# Patient Record
Sex: FEMALE | Race: WHITE | Employment: FULL TIME | URBAN - METROPOLITAN AREA
[De-identification: names, ages, dates, MRNs, and addresses within clinical notes are randomized per-mention and may not be internally consistent; named-entity substitution may affect disease eponyms.]

---

## 2017-03-13 ENCOUNTER — RADIANT APPOINTMENT (OUTPATIENT)
Dept: GENERAL RADIOLOGY | Facility: CLINIC | Age: 52
End: 2017-03-13
Attending: INTERNAL MEDICINE
Payer: COMMERCIAL

## 2017-03-13 ENCOUNTER — OFFICE VISIT (OUTPATIENT)
Dept: PEDIATRICS | Facility: CLINIC | Age: 52
End: 2017-03-13
Payer: COMMERCIAL

## 2017-03-13 VITALS
WEIGHT: 140.13 LBS | RESPIRATION RATE: 14 BRPM | BODY MASS INDEX: 23.35 KG/M2 | OXYGEN SATURATION: 100 % | DIASTOLIC BLOOD PRESSURE: 80 MMHG | SYSTOLIC BLOOD PRESSURE: 120 MMHG | TEMPERATURE: 97.8 F | HEART RATE: 73 BPM | HEIGHT: 65 IN

## 2017-03-13 DIAGNOSIS — Z12.11 COLON CANCER SCREENING: ICD-10-CM

## 2017-03-13 DIAGNOSIS — M62.838 MUSCLE SPASM OF LEFT SHOULDER AREA: ICD-10-CM

## 2017-03-13 DIAGNOSIS — Z12.31 ENCOUNTER FOR SCREENING MAMMOGRAM FOR BREAST CANCER: ICD-10-CM

## 2017-03-13 DIAGNOSIS — R91.8 PULMONARY NODULES: ICD-10-CM

## 2017-03-13 DIAGNOSIS — R07.89 ATYPICAL CHEST PAIN: Primary | ICD-10-CM

## 2017-03-13 DIAGNOSIS — R07.89 ATYPICAL CHEST PAIN: ICD-10-CM

## 2017-03-13 PROCEDURE — 71020 XR CHEST 2 VW: CPT

## 2017-03-13 PROCEDURE — 99214 OFFICE O/P EST MOD 30 MIN: CPT | Performed by: INTERNAL MEDICINE

## 2017-03-13 PROCEDURE — 93000 ELECTROCARDIOGRAM COMPLETE: CPT | Performed by: INTERNAL MEDICINE

## 2017-03-13 RX ORDER — CYCLOBENZAPRINE HCL 5 MG
5 TABLET ORAL 3 TIMES DAILY PRN
Qty: 42 TABLET | Refills: 0 | Status: SHIPPED | OUTPATIENT
Start: 2017-03-13 | End: 2017-12-11

## 2017-03-13 NOTE — NURSING NOTE
"Chief Complaint   Patient presents with     Establish Care       Initial /80 (BP Location: Right arm, Patient Position: Chair, Cuff Size: Adult Regular)  Pulse 73  Temp 97.8  F (36.6  C) (Oral)  Resp 14  Ht 5' 5\" (1.651 m)  Wt 140 lb 2 oz (63.6 kg)  LMP 02/27/2017 (Exact Date)  SpO2 100%  Breastfeeding? No  BMI 23.32 kg/m2 Estimated body mass index is 23.32 kg/(m^2) as calculated from the following:    Height as of this encounter: 5' 5\" (1.651 m).    Weight as of this encounter: 140 lb 2 oz (63.6 kg).  Medication Reconciliation: complete     Jazz Hoffman MA 3/13/2017 10:58 AM    "

## 2017-03-13 NOTE — MR AVS SNAPSHOT
After Visit Summary   3/13/2017    Brii Taylor    MRN: [id number]           Patient Information     Date Of Birth          1965        Visit Information        Provider Department      3/13/2017 10:50 AM Fariba Bryson MD [facility]        Today's Diagnoses     Colon cancer screening    -  1    Encounter for screening mammogram for breast cancer        Muscle spasm of left shoulder area        Atypical chest pain          Care Instructions    This seems most likely to be due to the muscle tightness/spasming in your left shoulder. ECG today looked good, we will check a chest X-ray on your way out of clinic.     In the meantime, try the followin. Scheduled ibuprofen for a few days, take 400-600mg three times daily with food!  2. Stretching! Ice, heat can be helpful as well.  3 Use muscle relaxant (Flexeril) at night as needed, no drinking alcohol of driving with this.  4. We can consider physical therapy in the future.    If symptoms worsen, you develop chest pain with exertion or shortness of breath, go to the ER or call us immediately.         Follow-ups after your visit        Additional Services     GASTROENTEROLOGY ADULT REF PROCEDURE ONLY       Last Lab Result: Creatinine (mg/dL)       Date                     Value                 10/26/2015               0.66             ----------  Body mass index is 23.32 kg/(m^2).      Patient will be contacted to schedule procedure.     Please be aware that coverage of these services is subject to the terms and limitations of your health insurance plan.  Call member services at your health plan with any benefit or coverage questions.  Any procedures must be performed at a [place] facility OR coordinated by your clinic's referral office.    Please bring the following with you to your appointment:    (1) Any X-Rays, CTs or MRIs which have been performed.  Contact the facility where they were done to arrange for  prior to  your scheduled appointment.    (2) List of current medications   (3) This referral request   (4) Any documents/labs given to you for this referral                  Your next 10 appointments already scheduled     Mar 13, 2017 11:35 AM CDT   XR CHEST 2 VIEWS with EAXR1   [facility] ([facility])    [address] [place] [address]  [address]  [place] [id number] [phone]           Please bring a list of your current medicines to your exam. (Include vitamins, minerals and over-thecounter medicines.) Leave your valuables at home.  Tell your doctor if there is a chance you may be pregnant.  You do not need to do anything special for this exam.              Future tests that were ordered for you today     Open Future Orders        Priority Expected Expires Ordered    *MA Screening Digital Bilateral Routine  3/13/2018 3/13/2017            Who to contact     If you have questions or need follow up information about today's clinic visit or your schedule please contact [facility] directly at [phone].  Normal or non-critical lab and imaging results will be communicated to you by Webcrunchhart, letter or phone within 4 business days after the clinic has received the results. If you do not hear from us within 7 days, please contact the clinic through [email or web address] or phone. If you have a critical or abnormal lab result, we will notify you by phone as soon as possible.  Submit refill requests through [email or web address] or call your pharmacy and they will forward the refill request to us. Please allow 3 business days for your refill to be completed.          Additional Information About Your Visit        [email or web address] Information     [email or web address] gives you secure access to your electronic health record. If you see a primary care provider, you can also send messages to your care team and make appointments. If you have questions, please call your primary care clinic.  If you do not have a primary care provider, please  "call [phone] and they will assist you.        Care EveryWhere ID     This is your Care EveryWhere ID. This could be used by other organizations to access your [facility] medical records  [id number]        Your Vitals Were     Pulse Temperature Respirations Height Last Period Pulse Oximetry    73 97.8  F (36.6  C) (Oral) 14 5' 5\" (1.651 m) 02/27/2017 (Exact Date) 100%    Breastfeeding? BMI (Body Mass Index)                No 23.32 kg/m2           Blood Pressure from Last 3 Encounters:   03/13/17 120/80   02/22/16 118/80   10/26/15 124/78    Weight from Last 3 Encounters:   03/13/17 140 lb 2 oz (63.6 kg)   02/22/16 142 lb (64.4 kg)   10/26/15 139 lb 9.6 oz (63.3 kg)              We Performed the Following     EKG 12-lead complete w/read - Clinics     GASTROENTEROLOGY ADULT REF PROCEDURE ONLY          Today's Medication Changes          These changes are accurate as of: 3/13/17 11:31 AM.  If you have any questions, ask your nurse or doctor.               Start taking these medicines.        Dose/Directions    cyclobenzaprine 5 MG tablet   Commonly known as:  FLEXERIL   Used for:  Muscle spasm of left shoulder area   Started by:  Fariba Bryson MD        Dose:  5 mg   Take 1 tablet (5 mg) by mouth 3 times daily as needed for muscle spasms   Quantity:  42 tablet   Refills:  0            Where to get your medicines      These medications were sent to [facility] [id number] [place] [address] [facility] [address] [address] [facility] [address]     Phone:  [phone]     cyclobenzaprine 5 MG tablet                Primary Care Provider Office Phone # Fax #    Shadykumari ERLINDA Pan [phone]       [facility] [address] [place] [address]        Thank you!     Thank you for choosing [facility]  for your care. Our goal is always to provide you with excellent care. Hearing back from our patients is one way we can continue to " improve our services. Please take a few minutes to complete the written survey that you may receive in the mail after your visit with us. Thank you!             Your Updated Medication List - Protect others around you: Learn how to safely use, store and throw away your medicines at www.disposemymeds.org.          This list is accurate as of: 3/13/17 11:31 AM.  Always use your most recent med list.                   Brand Name Dispense Instructions for use    cyclobenzaprine 5 MG tablet    FLEXERIL    42 tablet    Take 1 tablet (5 mg) by mouth 3 times daily as needed for muscle spasms       NECON 0.5/35 (28) 0.5-35 MG-MCG per tablet   Generic drug:  norethindrone-ethinyl estradiol      1 TABLET DAILY       ZOLOFT 25 MG tablet   Generic drug:  sertraline      Take  by mouth daily. 1 1/2 tab daily-37 mg

## 2017-03-13 NOTE — PATIENT INSTRUCTIONS
This seems most likely to be due to the muscle tightness/spasming in your left shoulder. ECG today looked good, we will check a chest X-ray on your way out of clinic.     In the meantime, try the followin. Scheduled ibuprofen for a few days, take 400-600mg three times daily with food!  2. Stretching! Ice, heat can be helpful as well.  3 Use muscle relaxant (Flexeril) at night as needed, no drinking alcohol of driving with this.  4. We can consider physical therapy in the future.    If symptoms worsen, you develop chest pain with exertion or shortness of breath, go to the ER or call us immediately.

## 2017-03-13 NOTE — PROGRESS NOTES
"  SUBJECTIVE:                                                    Brii Taylor is a 51 year old female who presents to clinic today for the following health issues:    Pt is here today to establish care with Dr. Orosco    Chest Pain      Onset: x1 week intermittent     Description (location/character/radiation/duration): left side, pulling and cramping, discomfort, radiates into left arm. Unsure if related to shoulder pain.     Intensity:  5/10    Accompanying signs and symptoms:        Shortness of breath: no        Sweating: no        Nausea/vomitting: no        Palpitations: no        Other (fevers/chills/cough/heartburn/lightheadedness): none     History (similar episodes/previous evaluation): None    Precipitating or alleviating factors:       Worse with exertion: no        Worse with breathing: no        Related to eating: no        Better with burping: no     Therapies tried and outcome: Gela Teresa comes in for evaluation of L sided shoulder pain that radiates around to the front of her chest. She reports that she has had a history of a \"crick\" in her L shoulder previously, which will occasionally get worse with stress, and it feels similar to this. However, the pain now wraps around to her shoulder, extends down the arm at least to the elbow, and wraps around the front of her chest. The pain feels like a cramping and squeezing. She does have some tingling down her arm. The pain comes and goes, it is not better or worse with exertion or rest. She really hasn't noticed anything that makes the pain better or worse aside from Advil helping a little. Symptoms have been going on for past 10 days, maybe getting a little worse. No pain in her neck. no shortness of breath, sweating or nausea.       Problem list and histories reviewed & adjusted, as indicated.  Additional history: as documented    Patient Active Problem List   Diagnosis     Contraception     Anxiety     CARDIOVASCULAR SCREENING; LDL GOAL " "LESS THAN 160     Onychomycosis     Finger pain, right     Plantar warts     Pulmonary nodules     Mitral valve prolapse     Past Surgical History   Procedure Laterality Date     Tonsillectomy         Social History   Substance Use Topics     Smoking status: Never Smoker     Smokeless tobacco: Never Used     Alcohol use No     Family History   Problem Relation Age of Onset     Hypertension Mother      CANCER Mother 70     nonHodgkins lymphoma     Psychotic Disorder Mother      anxiety     Alzheimer Disease Father      Breast Cancer Sister      breast ca age 38     GASTROINTESTINAL DISEASE Brother      crohns or colitis           Reviewed and updated as needed this visit by clinical staff  Tobacco  Allergies  Med Hx  Fam Hx  Soc Hx      ROS:  Constitutional, HEENT, cardiovascular, pulmonary, gi and gu systems are negative, except as otherwise noted.    OBJECTIVE:                                                    /80 (BP Location: Right arm, Patient Position: Chair, Cuff Size: Adult Regular)  Pulse 73  Temp 97.8  F (36.6  C) (Oral)  Resp 14  Ht 5' 5\" (1.651 m)  Wt 140 lb 2 oz (63.6 kg)  LMP 02/27/2017 (Exact Date)  SpO2 100%  Breastfeeding? No  BMI 23.32 kg/m2  Body mass index is 23.32 kg/(m^2).  GENERAL: healthy, alert and no distress  EYES: Eyes grossly normal to inspection, PERRL and conjunctivae and sclerae normal  HENT: ear canals and TM's normal, nose and mouth without ulcers or lesions  NECK: no adenopathy, no asymmetry, masses, or scars and thyroid normal to palpation  RESP: lungs clear to auscultation - no rales, rhonchi or wheezes  CV: regular rate and rhythm, normal S1 S2, no S3 or S4, no murmur, click or rub, no peripheral edema and peripheral pulses strong  ABDOMEN: soft, nontender, no hepatosplenomegaly, no masses and bowel sounds normal  MS: +reproducible tenderness and tightness along L rhomboid and trapezius connection to scapula. no reproducible L chest pain. Full ROM of L shoulder without " tenderness.     Diagnostic Test Results:  Xray - No pulmonary infiltrates and normal cardiac size, multiple R sided well circumscribed nodules with more irregular L sided nodule  EKG - NSR by my read. No ST changes.      ASSESSMENT/PLAN:                                                      1. Atypical chest pain  Symptoms seem most consistent with MSK process, given shoulder symptoms. ECG reassuring, and given lack of exertional symptoms, unlikely to be ACS. CXR concerning for L lung nodule (doubt however this is source of her pain), work-up as below.   - XR Chest 2 Views; Future    2. Muscle spasm of left shoulder area  Continue with NSAIDs at this time, will add in muscle relaxant to see if this helps. Discussed heat/ice and stretching. Can refer to physical therapy if needed.   - cyclobenzaprine (FLEXERIL) 5 MG tablet; Take 1 tablet (5 mg) by mouth 3 times daily as needed for muscle spasms  Dispense: 42 tablet; Refill: 0    3. Colon cancer screening  - GASTROENTEROLOGY ADULT REF PROCEDURE ONLY    4. Encounter for screening mammogram for breast cancer  - *MA Screening Digital Bilateral; Future    5. Pulmonary nodules  Will obtain chest CT for further characterization.   - CT Chest w Contrast; Future    Patient Instructions   This seems most likely to be due to the muscle tightness/spasming in your left shoulder. ECG today looked good, we will check a chest X-ray on your way out of clinic.     In the meantime, try the followin. Scheduled ibuprofen for a few days, take 400-600mg three times daily with food!  2. Stretching! Ice, heat can be helpful as well.  3 Use muscle relaxant (Flexeril) at night as needed, no drinking alcohol of driving with this.  4. We can consider physical therapy in the future.    If symptoms worsen, you develop chest pain with exertion or shortness of breath, go to the ER or call us immediately.       Fariba Orosco MD  [facility]

## 2017-03-17 PROBLEM — I34.1 MITRAL VALVE PROLAPSE: Status: ACTIVE | Noted: 2017-03-17

## 2017-03-20 ENCOUNTER — HOSPITAL ENCOUNTER (OUTPATIENT)
Dept: MAMMOGRAPHY | Facility: CLINIC | Age: 52
End: 2017-03-20
Attending: INTERNAL MEDICINE
Payer: COMMERCIAL

## 2017-03-20 ENCOUNTER — HOSPITAL ENCOUNTER (OUTPATIENT)
Dept: CT IMAGING | Facility: CLINIC | Age: 52
Discharge: HOME | End: 2017-03-20
Attending: INTERNAL MEDICINE | Admitting: INTERNAL MEDICINE
Payer: COMMERCIAL

## 2017-03-20 DIAGNOSIS — Z12.31 ENCOUNTER FOR SCREENING MAMMOGRAM FOR BREAST CANCER: ICD-10-CM

## 2017-03-20 DIAGNOSIS — R91.8 PULMONARY NODULES: ICD-10-CM

## 2017-03-20 PROCEDURE — 25500064 ZZH RX 255 OP 636: Performed by: RADIOLOGY

## 2017-03-20 PROCEDURE — G0202 SCR MAMMO BI INCL CAD: HCPCS

## 2017-03-20 PROCEDURE — 25000128 H RX IP 250 OP 636: Performed by: RADIOLOGY

## 2017-03-20 PROCEDURE — 71260 CT THORAX DX C+: CPT

## 2017-03-20 RX ORDER — IOPAMIDOL 755 MG/ML
500 INJECTION, SOLUTION INTRAVASCULAR ONCE
Status: COMPLETED | OUTPATIENT
Start: 2017-03-20 | End: 2017-03-20

## 2017-03-20 RX ADMIN — IOPAMIDOL 80 ML: 755 INJECTION, SOLUTION INTRAVENOUS at 08:06

## 2017-03-20 RX ADMIN — SODIUM CHLORIDE 60 ML: 9 INJECTION, SOLUTION INTRAVENOUS at 08:06

## 2017-03-31 ENCOUNTER — TELEPHONE (OUTPATIENT)
Dept: GASTROENTEROLOGY | Facility: CLINIC | Age: 52
End: 2017-03-31

## 2017-03-31 NOTE — TELEPHONE ENCOUNTER
Third attempt to reach patient to schedule Colonoscopy. Not Scheduled at [facility]. Left Messages.

## 2017-10-02 ENCOUNTER — TRANSFERRED RECORDS (OUTPATIENT)
Dept: HEALTH INFORMATION MANAGEMENT | Facility: CLINIC | Age: 52
End: 2017-10-02

## 2017-10-02 LAB
HPV ABSTRACT: NORMAL
PAP-ABSTRACT: NORMAL

## 2017-12-11 RX ORDER — NORETHINDRONE AND ETHINYL ESTRADIOL 1 MG-35MCG
1-35 KIT ORAL DAILY
Refills: 1 | COMMUNITY
Start: 2017-06-19 | End: 2019-03-08 | Stop reason: ALTCHOICE

## 2017-12-18 ENCOUNTER — HOSPITAL ENCOUNTER (OUTPATIENT)
Facility: CLINIC | Age: 52
Discharge: HOME | End: 2017-12-18
Attending: INTERNAL MEDICINE | Admitting: INTERNAL MEDICINE
Payer: COMMERCIAL

## 2017-12-18 VITALS
BODY MASS INDEX: 21.69 KG/M2 | DIASTOLIC BLOOD PRESSURE: 69 MMHG | WEIGHT: 135 LBS | HEIGHT: 66 IN | RESPIRATION RATE: 14 BRPM | SYSTOLIC BLOOD PRESSURE: 119 MMHG | HEART RATE: 85 BPM | OXYGEN SATURATION: 96 %

## 2017-12-18 LAB — COLONOSCOPY: NORMAL

## 2017-12-18 PROCEDURE — 25000128 H RX IP 250 OP 636: Performed by: INTERNAL MEDICINE

## 2017-12-18 PROCEDURE — 45378 DIAGNOSTIC COLONOSCOPY: CPT | Performed by: INTERNAL MEDICINE

## 2017-12-18 PROCEDURE — G0121 COLON CA SCRN NOT HI RSK IND: HCPCS | Performed by: INTERNAL MEDICINE

## 2017-12-18 PROCEDURE — G0500 MOD SEDAT ENDO SERVICE >5YRS: HCPCS | Performed by: INTERNAL MEDICINE

## 2017-12-18 RX ORDER — NALOXONE HYDROCHLORIDE 0.4 MG/ML
.1-.4 INJECTION, SOLUTION INTRAMUSCULAR; INTRAVENOUS; SUBCUTANEOUS
Status: DISCONTINUED | OUTPATIENT
Start: 2017-12-18 | End: 2017-12-18 | Stop reason: HOSPADM

## 2017-12-18 RX ORDER — ONDANSETRON 2 MG/ML
4 INJECTION INTRAMUSCULAR; INTRAVENOUS EVERY 6 HOURS PRN
Status: DISCONTINUED | OUTPATIENT
Start: 2017-12-18 | End: 2017-12-18 | Stop reason: HOSPADM

## 2017-12-18 RX ORDER — LIDOCAINE 40 MG/G
CREAM TOPICAL
Status: DISCONTINUED | OUTPATIENT
Start: 2017-12-18 | End: 2017-12-18 | Stop reason: HOSPADM

## 2017-12-18 RX ORDER — ONDANSETRON 2 MG/ML
4 INJECTION INTRAMUSCULAR; INTRAVENOUS
Status: DISCONTINUED | OUTPATIENT
Start: 2017-12-18 | End: 2017-12-18 | Stop reason: HOSPADM

## 2017-12-18 RX ORDER — FENTANYL CITRATE 50 UG/ML
INJECTION, SOLUTION INTRAMUSCULAR; INTRAVENOUS PRN
Status: DISCONTINUED | OUTPATIENT
Start: 2017-12-18 | End: 2017-12-18 | Stop reason: HOSPADM

## 2017-12-18 RX ORDER — FLUMAZENIL 0.1 MG/ML
0.2 INJECTION, SOLUTION INTRAVENOUS
Status: DISCONTINUED | OUTPATIENT
Start: 2017-12-18 | End: 2017-12-18 | Stop reason: HOSPADM

## 2017-12-18 RX ORDER — ONDANSETRON 4 MG/1
4 TABLET, ORALLY DISINTEGRATING ORAL EVERY 6 HOURS PRN
Status: DISCONTINUED | OUTPATIENT
Start: 2017-12-18 | End: 2017-12-18 | Stop reason: HOSPADM

## 2017-12-18 NOTE — H&P
Pre-Endoscopy History and Physical     Brii Taylor MRN# [id number]   YOB: 1965 Age: 52 year old     Date of Procedure: 12/18/2017  Primary care provider: Fariba Bryson  Type of Endoscopy: Colonoscopy with possible biopsy, possible polypectomy  Reason for Procedure: screen  Type of Anesthesia Anticipated: Conscious Sedation    HPI:    Brii is a 52 year old female who will be undergoing the above procedure.      A history and physical has been performed. The patient's medications and allergies have been reviewed. The risks and benefits of the procedure and the sedation options and risks were discussed with the patient.  All questions were answered and informed consent was obtained.      She denies a personal or family history of anesthesia complications or bleeding disorders.     Patient Active Problem List   Diagnosis     Contraception     Anxiety     CARDIOVASCULAR SCREENING; LDL GOAL LESS THAN 160     Onychomycosis     Finger pain, right     Plantar warts     Pulmonary nodules     Mitral valve prolapse        Past Medical History:   Diagnosis Date     Anxiety     sees psychiatrist     Migraine, unspecified, without mention of intractable migraine without mention of status migrainosus      Sleep disturbance, unspecified     hx sleep apnea     Viral warts, unspecified         Past Surgical History:   Procedure Laterality Date     TONSILLECTOMY         Social History   Substance Use Topics     Smoking status: Never Smoker     Smokeless tobacco: Never Used     Alcohol use No       Family History   Problem Relation Age of Onset     Hypertension Mother      CANCER Mother 70     nonHodgkins lymphoma     Psychotic Disorder Mother      anxiety     Alzheimer Disease Father      Breast Cancer Sister      breast ca age 38     GASTROINTESTINAL DISEASE Brother      crohns or colitis     Colon Cancer No family hx of        Prior to Admission medications    Medication Sig Start Date End Date Taking?  "Authorizing Provider   NORTREL 1/35, 28, 1-35 MG-MCG per tablet Take 1-35 mcg by mouth daily 6/19/17  Yes Reported, Patient   sertraline (ZOLOFT) 25 MG tablet Take  by mouth daily. 1 1/2 tab daily-37 mg   Yes Reported, Patient       Allergies   Allergen Reactions     Penicillins      PN: LW Reaction: rash        REVIEW OF SYSTEMS:   5 point ROS negative except as noted above in HPI, including Gen., Resp., CV, GI &  system review.    PHYSICAL EXAM:   There were no vitals taken for this visit. Estimated body mass index is 23.32 kg/(m^2) as calculated from the following:    Height as of 3/13/17: 1.651 m (5' 5\").    Weight as of 3/13/17: 63.6 kg (140 lb 2 oz).   GENERAL APPEARANCE: alert, and oriented  MENTAL STATUS: alert  AIRWAY EXAM: Mallampatti Class I (visualization of the soft palate, fauces, uvula, anterior and posterior pillars)  RESP: lungs clear to auscultation - no rales, rhonchi or wheezes  CV: regular rates and rhythm  DIAGNOSTICS:    Not indicated    IMPRESSION   ASA Class 1 - Healthy patient, no medical problems    PLAN:   Plan for Colonoscopy with possible biopsy, possible polypectomy. We discussed the risks, benefits and alternatives and the patient wished to proceed.    The above has been forwarded to the consulting provider.      Signed Electronically by: Babar Gramajo  December 18, 2017          "

## 2017-12-18 NOTE — LETTER
November 8, 2017  Brii Taylor  [address] [facility] [address]        Thank you for choosing [facility]. You are scheduled for the following service.     Date:  Monday, December 4             Procedure:  COLONOSCOPY  Doctor:        Dr. Babar Gramajo   Arrival Time:   12:30pm  *check in at Emergency/Endoscopy desk*  Procedure Time:  1pm    Location:   [facility]        Endoscopy Department, First Floor (Enter through ER Doors) *        [address] [place], [place] [address]      [phone] or [phone] () to reschedule      MIRALAX -GATORADE  PREP  Colonoscopy is the most accurate test to detect colon polyps and colon cancer; and the only test where polyps can be removed. During this procedure, a doctor examines the lining of your large intestine and rectum through a flexible tube.           Transportation  Arrange for a ride for the day of your procedure with a responsible adult.  A taxi ride is not an option unless you are accompanied by a responsible adult. If you fail to arrange transportation with a responsible adult, your procedure will be cancelled and rescheduled.    Purchase the  following supplies at your local pharmacy:  - 2 (two) bisacodyl tablets: each tablet contains 5 mg.  (Dulcolax  laxative NOT Dulcolax  stool softener)   - 1 (one) 8.3 oz bottle of Polyethylene Glycol (PEG) 3350 Powder   (MiraLAX , Smooth LAX , ClearLAX  or equivalent)  - 64 oz Gatorade    Regular Gatorade, Gatorade G2 , Powerade , Powerade Zero  or Pedialyte  is acceptable. Red colored flavors are not allowed; all other colors (yellow, green, orange, purple and blue) are okay. It is also okay to buy two 2.12 oz packets of powdered Gatorade that can be mixed with water to a total volume of 64 oz of liquid.  - 1 (one) 10 oz bottle of Magnesium Citrate (Red colored flavors are not allowed)  It is also okay for you to use a 0.5 oz package of  powdered magnesium citrate (17 g) mixed with 10 oz of water.    PREPARATION FOR COLONOSCOPY    7 days before:    Discontinue fiber supplements and medications containing iron. This includes Metamucil  and Fibercon ; and multivitamins with iron.  3 days before:    Begin a low-fiber diet. A low-fiber diet helps making the cleanout more effective.     Examples of a low-fiber diet include (but are not limited to): white bread, white rice, pasta, crackers, fish, chicken, eggs, ground beef, creamy peanut butter, cooked/steamed/boiled vegetables, canned fruit, bananas, melons, milk, plain yogurt cheese, salad dressing and other condiments.     The following are not allowed on a low-fiber diet: seeds, nuts, popcorn, bran, whole wheat, corn, quinoa, raw fruits and vegetables, berries and dried fruit, beans and lentils.    For additional details on low-fiber diet, please refer to the table on the last page.  2 days before:    Continue the low-fiber diet.     Drink at least 8 glasses of water throughout the day.     Stop eating solid foods at 11:45 pm.  1 day before:    In the morning: begin a clear liquid diet (liquids you can see through).     Examples of a clear liquid diet include: water, clear broth or bouillon, Gatorade, Pedialyte or Powerade, carbonated and non-carbonated soft drinks (Sprite , 7-Up , ginger ale), strained fruit juices without pulp (apple, white grape, white cranberry), Jell-O  and popsicles.     The following are not allowed on a clear liquid diet: red liquids, alcoholic beverages, coffee, dairy products (milk, creamer, and yogurt), protein shakes, creamy broths, juice with pulp and chewing tobacco.    At noon: take 2 (two) bisacodyl tablets     At 4 (and no later than 6pm): start drinking the Miralax-Gatorade preparation (8.3 oz of Miralax mixed with 64 oz of Gatorade in a large pitcher). Drink 1(one) 8 oz glass every 15 minutes thereafter, until the mixture is gone.    COLON CLEANSING TIPS: drink  adequate amounts of fluids before and after your colon cleansing to prevent dehydration. Stay near a toilet because you will have diarrhea. Even if you are sitting on the toilet, continue to drink the cleansing solution every 15 minutes. If you feel nauseous or vomit, rinse your mouth with water, take a 15 to 30-minute-break and then continue drinking the solution. You will be uncomfortable until the stool has flushed from your colon (in about 2 to 4 hours). You may feel chilled.              Day of your procedure  You may take all of your morning medications including blood pressure medications, blood thinners (if you have not been instructed to stop these by our office), methadone, anti-seizure medications with sips of water 3 hours prior to your procedure or earlier. Do not take insulin or vitamins prior to your procedure. Continue the clear liquid diet.   4 hours prior: drink 10 oz of magnesium citrate. It may be easier to drink it with a straw.    STOP consuming all liquids after that.     Do not take anything by mouth during this time.     Allow extra time to travel to your procedure as you may need to stop and use a restroom along the way.  You are ready for the procedure, if you followed all instructions and your stool is no longer formed, but clear or yellow liquid. If you are unsure whether your colon is clean, please call our office at [phone] before you leave for your appointment.  Bring the following to your procedure:  - Insurance Card/Photo ID.   - List of current medications including over-the-counter medications and supplements.   - Your rescue inhaler if you currently use one to control asthma.      Canceling or rescheduling your appointment:   If you must cancel or reschedule your appointment, please call [phone] as soon as possible.      COLONOSCOPY PRE-PROCEDURE CHECKLIST  If you have diabetes, ask your regular doctor for diet and medication restrictions.  If you take an anticoagulant  or anti-platelet medication (such as Coumadin , Lovenox , Pradaxa , Xarelto , Eliquis , etc.), please call your primary doctor for advice on holding this medication.  If you take aspirin you may continue to do so.  If you are or may be pregnant, please discuss the risks and benefits of this procedure with your doctor.          What happens during a colonoscopy?    Plan to spend up to two hours, starting at registration time, at the endoscopy center the day of your procedure. The colonoscopy takes an average of 15 to 30 minutes. Recovery time is about 30 minutes.    Before the exam:    You will change into a gown.    Your medical history and medication list will be reviewed with you, unless that has been done over the phone prior to the procedure.     A nurse will insert an intravenous (IV) line into your hand or arm.    The doctor will meet with you and will give you a consent form to sign.    During the exam:     Medicine will be given through the IV line to help you relax.     Your heart rate and oxygen levels will be monitored. If your blood pressure is low, you may be given fluids through the IV line.     The doctor will insert a flexible hollow tube, called a colonoscope, into your rectum. The scope will be advanced slowly through the large intestine (colon).    You may have a feeling of fullness or pressure.     If an abnormal tissue or a polyp is found, the doctor may remove it through the endoscope for closer examination, or biopsy. Tissue removal is painless    After the exam:           Any tissue samples removed during the exam will be sent to a lab for evaluation. It may take 5-7 working days for you to be notified of the results.     A nurse will provide you with complete discharge instructions before you leave the endoscopy center. Be sure to ask the nurse for specific instructions if you take blood thinners such as Aspirin, Coumadin or Plavix.     The doctor will prepare a full report for you and for  the physician who referred you for the procedure.     Your doctor will talk with you about the initial results of your exam.      Medication given during the exam will prohibit you from driving for the rest of the day.     Following the exam, you may resume your normal diet. Your first meal should be light, no greasy foods. Avoid alcohol until the next day.     You may resume your regular activities the day after the procedure.     LOW-FIBER DIET    Foods RECOMMENDED Foods to AVOID   Breads, Cereal, Rice and Pasta:   White bread, rolls, biscuits, croissant and erica toast.   Waffles, Albanian toast and pancakes.   White rice, noodles, pasta, macaroni and peeled cooked potatoes.   Plain crackers and saltines.   Cooked cereals: farina, cream of rice.   Cold cereals: Puffed Rice , Rice Krispies , Corn Flakes  and Special K    Breads, Cereal, Rice and Pasta:   Breads or rolls with nuts, seeds or fruit.   Whole wheat, pumpernickel, rye breads and cornbread.   Potatoes with skin, brown or wild rice, and kasha (buckwheat).     Vegetables:   Tender cooked and canned vegetables without seeds: carrots, asparagus tips, green or wax beans, pumpkin, spinach, lima beans. Vegetables:   Raw or steamed vegetables.   Vegetables with seeds.   Sauerkraut.   Winter squash, peas, broccoli, Brussel sprouts, cabbage, onions, cauliflower, baked beans, peas and corn.   Fruits:   Strained fruit juice.   Canned fruit, except pineapple.   Ripe bananas and melon. Fruits:   Prunes and prune juice.   Raw fruits.   Dried fruits: figs, dates and raisins.   Milk/Dairy:   Milk: plain or flavored.   Yogurt, custard and ice cream.   Cheese and cottage cheese Milk/Dairy:     Meat and other proteins:   ground, well-cooked tender beef, lamb, ham, veal, pork, fish, poultry and organ meats.   Eggs.   Peanut butter without nuts. Meat and other proteins:   Tough, fibrous meats with gristle.   Dry beans, peas and lentils.   Peanut butter with nuts.   Tofu.    Fats, Snack, Sweets, Condiments and Beverages:   Margarine, butter, oils, mayonnaise, sour cream and salad dressing, plain gravy.   Sugar, hard candy, clear jelly, honey and syrup.   Spices, cooked herbs, bouillon, broth and soups made with allowed vegetable, ketchup and mustard.   Coffee, tea and carbonated drinks.   Plain cakes, cookies and pretzels.   Gelatin, plain puddings, custard, ice cream, sherbet and popsicles. Fats, Snack, Sweets, Condiments and Beverages:   Nuts, seeds and coconut.   Jam, marmalade and preserves.   Pickles, olives, relish and horseradish.   All desserts containing nuts, seeds, dried fruit and coconut; or made from whole grains or bran.   Candy made with nuts or seeds.   Popcorn.                     DIRECTIONS TO THE ENDOSCOPY DEPARTMENT     From the north ([facility])  Take [address] South, exit on [address] [address]. Get into the left hand dustin, turn left (east), go one-half mile to [address] and turn left. Go north to the first stoplight, take a right on [address] and follow it to the Emergency entrance.    From the south ([facility])  Take 35N to the [address]E split and exit on [address] [address]. On [address] [address], turn left (west) to [address]. Turn right (north) on [address]. Go north to the first stoplight, take a right on [address] and follow it to the Emergency entrance.    From the east via 35E ([facility])  Take [address] south to [address] [address] exit. Turn right on [address] . Go west to [address]. Turn right (north) on [address]. Go to the first stoplight, take a right and follow on [address] to the Emergency entrance.    From the east via [address] ([facility])  Take [address] to [address]. Turn left (south) on [address] to [address]. Turn left (east) on [address] and follow it to the Emergency entrance.    From the west via [address] ([place])  Take [address] east  to [address]. Turn right (south) on [address] to [address]. Turn left (east) on [address] and follow it to the Emergency entrance.

## 2018-04-12 ENCOUNTER — OFFICE VISIT (OUTPATIENT)
Dept: PEDIATRICS | Facility: CLINIC | Age: 53
End: 2018-04-12
Payer: COMMERCIAL

## 2018-04-12 VITALS
SYSTOLIC BLOOD PRESSURE: 110 MMHG | DIASTOLIC BLOOD PRESSURE: 70 MMHG | HEIGHT: 65 IN | TEMPERATURE: 98.7 F | OXYGEN SATURATION: 98 % | WEIGHT: 138.5 LBS | BODY MASS INDEX: 23.07 KG/M2 | HEART RATE: 80 BPM

## 2018-04-12 DIAGNOSIS — Z00.00 WELL WOMAN EXAM WITHOUT GYNECOLOGICAL EXAM: Primary | ICD-10-CM

## 2018-04-12 DIAGNOSIS — M62.838 NECK MUSCLE SPASM: ICD-10-CM

## 2018-04-12 PROCEDURE — 99396 PREV VISIT EST AGE 40-64: CPT | Performed by: INTERNAL MEDICINE

## 2018-04-12 RX ORDER — CYCLOBENZAPRINE HCL 10 MG
5-10 TABLET ORAL 3 TIMES DAILY PRN
Qty: 30 TABLET | Refills: 0 | Status: SHIPPED | OUTPATIENT
Start: 2018-04-12 | End: 2018-10-29

## 2018-04-12 ASSESSMENT — ENCOUNTER SYMPTOMS
WEAKNESS: 0
FEVER: 0
EYE PAIN: 0
FREQUENCY: 0
ABDOMINAL PAIN: 0
PARESTHESIAS: 0
SHORTNESS OF BREATH: 0
JOINT SWELLING: 0
DYSURIA: 0
ARTHRALGIAS: 0
PALPITATIONS: 0
HEARTBURN: 0
HEMATURIA: 0
MYALGIAS: 1
SORE THROAT: 0
CONSTIPATION: 0
CHILLS: 0
COUGH: 0
HEADACHES: 1
DIARRHEA: 0
NAUSEA: 0
DIZZINESS: 0
NERVOUS/ANXIOUS: 0
HEMATOCHEZIA: 0

## 2018-04-12 NOTE — PROGRESS NOTES
SUBJECTIVE:   CC: Brii Taylor is an 52 year old woman who presents for preventive health visit.     Physical   Annual:     Getting at least 3 servings of Calcium per day::  Yes    Bi-annual eye exam::  NO    Dental care twice a year::  Yes    Sleep apnea or symptoms of sleep apnea::  Sleep apnea    Diet::  Regular (no restrictions)    Frequency of exercise::  2-3 days/week    Duration of exercise::  15-30 minutes    Taking medications regularly::  Yes    Medication side effects::  Not applicable    Additional concerns today::  YES          Concerns:  Left neck strain: Started bothering her toward the back on the L side. Has tried heat, ice, stretching. More nagging. Will cause some headaches, taking more OTC pain relievers than she would like to. Bothersome all the time, will wake her up at night. Hasn't tried massage or physical therapy. Been going on for 6 weeks, not improving. No injury. No numbness, weakness, tingling.        Today's PHQ-2 Score:   PHQ-2 ( 1999 Pfizer) 3/13/2017   Q1: Little interest or pleasure in doing things 0   Q2: Feeling down, depressed or hopeless 0   PHQ-2 Score 0       Abuse: Current or Past(Physical, Sexual or Emotional)- No  Do you feel safe in your environment - Yes    Social History   Substance Use Topics     Smoking status: Never Smoker     Smokeless tobacco: Never Used     Alcohol use No     Alcohol Use 4/12/2018   If you drink alcohol do you typically have greater than 3 drinks per day OR greater than 7 drinks per week? No       Reviewed orders with patient.  Reviewed health maintenance and updated orders accordingly - Yes  Patient Active Problem List   Diagnosis     Contraception     Anxiety     CARDIOVASCULAR SCREENING; LDL GOAL LESS THAN 160     Onychomycosis     Finger pain, right     Plantar warts     Pulmonary nodules     Mitral valve prolapse     Past Surgical History:   Procedure Laterality Date     COLONOSCOPY Left 12/18/2017    Procedure: COLONOSCOPY;   Colonoscopy; difficulty in advancing scope (tight corner) so used biopsy forcep to follow/ advance scope;  Surgeon: Babar Gramajo MD;  Location:  GI     TONSILLECTOMY         Social History   Substance Use Topics     Smoking status: Never Smoker     Smokeless tobacco: Never Used     Alcohol use No     Family History   Problem Relation Age of Onset     Hypertension Mother      CANCER Mother 70     nonHodgkins lymphoma     Psychotic Disorder Mother      anxiety     Alzheimer Disease Father      Breast Cancer Sister      breast ca age 38     GASTROINTESTINAL DISEASE Brother      crohns or colitis     Colon Cancer No family hx of            Patient over age 50, mutual decision to screen reflected in health maintenance.    Pertinent mammograms are reviewed under the imaging tab.  History of abnormal Pap smear: NO - age 30-65 PAP every 5 years with negative HPV co-testing recommended    Reviewed and updated as needed this visit by clinical staff  Tobacco  Allergies  Meds  Med Hx  Soc Hx            Review of Systems   Constitutional: Negative for chills and fever.   HENT: Positive for congestion. Negative for ear pain, hearing loss and sore throat.    Eyes: Negative for pain and visual disturbance.   Respiratory: Negative for cough and shortness of breath.    Cardiovascular: Negative for chest pain, palpitations and peripheral edema.   Gastrointestinal: Negative for abdominal pain, constipation, diarrhea, heartburn, hematochezia and nausea.   Genitourinary: Negative for dysuria, frequency, genital sores, hematuria, pelvic pain, urgency, vaginal bleeding and vaginal discharge.   Musculoskeletal: Positive for myalgias. Negative for arthralgias and joint swelling.   Skin: Negative for rash.   Neurological: Positive for headaches. Negative for dizziness, weakness and paresthesias.   Psychiatric/Behavioral: Negative for mood changes. The patient is not nervous/anxious.           OBJECTIVE:   /70 (BP Location:  "Right arm, Patient Position: Chair, Cuff Size: Adult Regular)  Pulse 80  Temp 98.7  F (37.1  C) (Tympanic)  Ht 5' 5\" (1.651 m)  Wt 138 lb 8 oz (62.8 kg)  LMP 03/26/2018 (Approximate)  SpO2 98%  Breastfeeding? No  BMI 23.05 kg/m2  Physical Exam  GENERAL: healthy, alert and no distress  EYES: Eyes grossly normal to inspection, PERRL and conjunctivae and sclerae normal  HENT: ear canals and TM's normal, nose and mouth without ulcers or lesions  NECK: no adenopathy, no asymmetry, masses, or scars and thyroid normal to palpation. + L neck tightness with decrease ROM turning to L side  RESP: lungs clear to auscultation - no rales, rhonchi or wheezes  BREAST: normal without masses, tenderness or nipple discharge and no palpable axillary masses or adenopathy  CV: regular rate and rhythm, normal S1 S2, no S3 or S4, no murmur, click or rub, no peripheral edema and peripheral pulses strong  ABDOMEN: soft, nontender, no hepatosplenomegaly, no masses and bowel sounds normal  MS: no gross musculoskeletal defects noted, no edema  SKIN: no suspicious lesions or rashes  NEURO: Normal strength and tone, mentation intact and speech normal  PSYCH: mentation appears normal, affect normal/bright    ASSESSMENT/PLAN:   1. Well woman exam without gynecological exam  Pap, colonoscopy up to date. Counseling as below .    2. Neck muscle spasm  No improvement with NSAIDs, ice/heat and stretching. Will have her start muscle relaxant (reviewed medication side effects) and start massage/PT. Follow-up if not improving.   - cyclobenzaprine (FLEXERIL) 10 MG tablet; Take 0.5-1 tablets (5-10 mg) by mouth 3 times daily as needed for muscle spasms  Dispense: 30 tablet; Refill: 0  - [facility] RE[facility]    COUNSELING:  Reviewed preventive health counseling, as reflected in patient instructions  Special attention given to:        Regular exercise       Healthy diet/nutrition       Colon cancer screening       (Karlee)menopause " "management         reports that she has never smoked. She has never used smokeless tobacco.    Estimated body mass index is 21.79 kg/(m^2) as calculated from the following:    Height as of 12/18/17: 5' 6\" (1.676 m).    Weight as of 12/18/17: 135 lb (61.2 kg).   Weight management plan: Discussed healthy diet and exercise guidelines and patient will follow up in 12 months in clinic to re-evaluate.    Counseling Resources:  ATP IV Guidelines  Pooled Cohorts Equation Calculator  Breast Cancer Risk Calculator  FRAX Risk Assessment  ICSI Preventive Guidelines  Dietary Guidelines for Americans, 2010  USDA's MyPlate  ASA Prophylaxis  Lung CA Screening    Fariba Orosco MD  [facility]  Answers for HPI/ROS submitted by the patient on 4/12/2018   PHQ-2 Score: 0    "

## 2018-04-12 NOTE — PATIENT INSTRUCTIONS
Neck spasm:  -- continue ibuprofen, heat/ice  -- continue stretching  -- start massage  -- consider physical therapy if massage doesn't help (referral placed)  -- use cyclobenzaprine (muscle relaxant) 1/2-1 tab up to 3 times daily as needed for spasms. Do not drive or drink alcohol on this medication    Preventive Health Recommendations  Female Ages 50 - 64    Yearly exam: See your health care provider every year in order to  o Review health changes.   o Discuss preventive care.    o Review your medicines if your doctor has prescribed any.      Get a Pap test every three years (unless you have an abnormal result and your provider advises testing more often).    If you get Pap tests with HPV test, you only need to test every 5 years, unless you have an abnormal result.     You do not need a Pap test if your uterus was removed (hysterectomy) and you have not had cancer.    You should be tested each year for STDs (sexually transmitted diseases) if you're at risk.     Have a mammogram every 1 to 2 years.    Have a colonoscopy at age 50, or have a yearly FIT test (stool test). These exams screen for colon cancer.      Have a cholesterol test every 5 years, or more often if advised.    Have a diabetes test (fasting glucose) every three years. If you are at risk for diabetes, you should have this test more often.     If you are at risk for osteoporosis (brittle bone disease), think about having a bone density scan (DEXA).    Shots: Get a flu shot each year. Get a tetanus shot every 10 years.    Nutrition:     Eat at least 5 servings of fruits and vegetables each day.    Eat whole-grain bread, whole-wheat pasta and brown rice instead of white grains and rice.    Talk to your provider about Calcium and Vitamin D.     Lifestyle    Exercise at least 150 minutes a week (30 minutes a day, 5 days a week). This will help you control your weight and prevent disease.    Limit alcohol to one drink per day.    No smoking.     Wear  sunscreen to prevent skin cancer.     See your dentist every six months for an exam and cleaning.    See your eye doctor every 1 to 2 years.

## 2018-04-12 NOTE — MR AVS SNAPSHOT
After Visit Summary   4/12/2018    Brii Taylor    MRN: [id number]           Patient Information     Date Of Birth          1965        Visit Information        Provider Department      4/12/2018 1:30 PM Fariba Bryson MD [facility]        Today's Diagnoses     Neck muscle spasm    -  1      Care Instructions    Neck spasm:  -- continue ibuprofen, heat/ice  -- continue stretching  -- start massage  -- consider physical therapy if massage doesn't help (referral placed)  -- use cyclobenzaprine (muscle relaxant) 1/2-1 tab up to 3 times daily as needed for spasms. Do not drive or drink alcohol on this medication    Preventive Health Recommendations  Female Ages 50 - 64    Yearly exam: See your health care provider every year in order to  o Review health changes.   o Discuss preventive care.    o Review your medicines if your doctor has prescribed any.      Get a Pap test every three years (unless you have an abnormal result and your provider advises testing more often).    If you get Pap tests with HPV test, you only need to test every 5 years, unless you have an abnormal result.     You do not need a Pap test if your uterus was removed (hysterectomy) and you have not had cancer.    You should be tested each year for STDs (sexually transmitted diseases) if you're at risk.     Have a mammogram every 1 to 2 years.    Have a colonoscopy at age 50, or have a yearly FIT test (stool test). These exams screen for colon cancer.      Have a cholesterol test every 5 years, or more often if advised.    Have a diabetes test (fasting glucose) every three years. If you are at risk for diabetes, you should have this test more often.     If you are at risk for osteoporosis (brittle bone disease), think about having a bone density scan (DEXA).    Shots: Get a flu shot each year. Get a tetanus shot every 10 years.    Nutrition:     Eat at least 5 servings of fruits and vegetables each day.    Eat  whole-grain bread, whole-wheat pasta and brown rice instead of white grains and rice.    Talk to your provider about Calcium and Vitamin D.     Lifestyle    Exercise at least 150 minutes a week (30 minutes a day, 5 days a week). This will help you control your weight and prevent disease.    Limit alcohol to one drink per day.    No smoking.     Wear sunscreen to prevent skin cancer.     See your dentist every six months for an exam and cleaning.    See your eye doctor every 1 to 2 years.            Follow-ups after your visit        Additional Services     [facility] PT, HAND, AND CHIROPRACTIC REFERRAL       **This order will print in the [facility] Scheduling Office**    Physical Therapy, Hand Therapy and Chiropractic Care are available through:    *[facility]  *[facility]  *[facility]    Call one number to schedule at any of the above locations: [phone].    Your provider has referred you to: Physical Therapy at [facility] or [facility]    Indication/Reason for Referral: Neck Pain  Onset of Illness: 6 weeks  Therapy Orders: Evaluate and Treat  Special Programs: None  Special Request: None    Leonardo Patterson      Additional Comments for the Therapist or Chiropractor:     Please be aware that coverage of these services is subject to the terms and limitations of your health insurance plan.  Call member services at your health plan with any benefit or coverage questions.      Please bring the following to your appointment:    *Your personal calendar for scheduling future appointments  *Comfortable clothing                  Who to contact     If you have questions or need follow up information about today's clinic visit or your schedule please contact [facility] directly at [phone].  Normal or non-critical lab and imaging results will be communicated to you by MyChart, letter or phone within 4 business days after the clinic has received the results. If you do not hear  "from us within 7 days, please contact the clinic through eMinor or phone. If you have a critical or abnormal lab result, we will notify you by phone as soon as possible.  Submit refill requests through eMinor or call your pharmacy and they will forward the refill request to us. Please allow 3 business days for your refill to be completed.          Additional Information About Your Visit        Simple[employer] Systems Information     eMinor gives you secure access to your electronic health record. If you see a primary care provider, you can also send messages to your care team and make appointments. If you have questions, please call your primary care clinic.  If you do not have a primary care provider, please call [phone] and they will assist you.        Care EveryWhere ID     This is your Care EveryWhere ID. This could be used by other organizations to access your [place] medical records  [id number]        Your Vitals Were     Pulse Temperature Height Last Period Pulse Oximetry Breastfeeding?    80 98.7  F (37.1  C) (Tympanic) 5' 5\" (1.651 m) 03/26/2018 (Approximate) 98% No    BMI (Body Mass Index)                   23.05 kg/m2            Blood Pressure from Last 3 Encounters:   04/12/18 110/70   12/18/17 119/69   03/13/17 120/80    Weight from Last 3 Encounters:   04/12/18 138 lb 8 oz (62.8 kg)   12/18/17 135 lb (61.2 kg)   03/13/17 140 lb 2 oz (63.6 kg)              We Performed the Following     [facility] PT, HAND, AND CHIROPRACTIC REFERRAL          Today's Medication Changes          These changes are accurate as of 4/12/18  2:02 PM.  If you have any questions, ask your nurse or doctor.               Start taking these medicines.        Dose/Directions    cyclobenzaprine 10 MG tablet   Commonly known as:  FLEXERIL   Used for:  Neck muscle spasm   Started by:  Fariba Bryson MD        Dose:  5-10 mg   Take 0.5-1 tablets (5-10 mg) by mouth 3 times daily as needed for muscle spasms   Quantity:  30 tablet   Refills:  0    "         Where to get your medicines      These medications were sent to [id number] [address] [facility] [address] [address] [facility], [facility] [address]     Phone:  [phone]     cyclobenzaprine 10 MG tablet                Primary Care Provider Office Phone # Fax #    Fariba Bryson [phone] [address] [place] [address]        Equal Access to Services     [facility] : Hadii aad ku hadasho Soomaali, waaxda luqadaha, qaybta kaalmada adeegyada, waxay idiin hayaan adeeg kharash lamartine . So [facility] [phone].    ATENCIÓN: Si habla español, tiene a samaniego disposición servicios gratuitos de asistencia lingüística. [facility] [phone].    We comply with applicable federal civil rights laws and [place] laws. We do not discriminate on the basis of race, color, national origin, age, disability, sex, sexual orientation, or gender identity.            Thank you!     Thank you for choosing [facility]  for your care. Our goal is always to provide you with excellent care. Hearing back from our patients is one way we can continue to improve our services. Please take a few minutes to complete the written survey that you may receive in the mail after your visit with us. Thank you!             Your Updated Medication List - Protect others around you: Learn how to safely use, store and throw away your medicines at www.disposemymeds.org.          This list is accurate as of 4/12/18  2:02 PM.  Always use your most recent med list.                   Brand Name Dispense Instructions for use Diagnosis    acetaminophen-caffeine 500-65 MG Tabs    EXCEDRIN TENSION HEADACHE     Take 2 tablets by mouth every 6 hours as needed for mild pain        cyclobenzaprine 10 MG tablet    FLEXERIL    30 tablet    Take 0.5-1 tablets (5-10 mg) by mouth 3 times daily as needed for muscle spasms    Neck muscle spasm       NORTREL 1/35 (28) 1-35 MG-MCG  per tablet   Generic drug:  norethindrone-ethinyl estradiol      Take 1-35 mcg by mouth daily        ZOLOFT 25 MG tablet   Generic drug:  sertraline      Take  by mouth daily. 1 1/2 tab daily-37 mg

## 2018-10-29 ENCOUNTER — OFFICE VISIT (OUTPATIENT)
Dept: PEDIATRICS | Facility: CLINIC | Age: 53
End: 2018-10-29
Payer: COMMERCIAL

## 2018-10-29 VITALS
DIASTOLIC BLOOD PRESSURE: 64 MMHG | TEMPERATURE: 98.1 F | HEIGHT: 65 IN | HEART RATE: 76 BPM | WEIGHT: 148 LBS | OXYGEN SATURATION: 97 % | SYSTOLIC BLOOD PRESSURE: 110 MMHG | BODY MASS INDEX: 24.66 KG/M2

## 2018-10-29 DIAGNOSIS — Z23 NEED FOR PROPHYLACTIC VACCINATION AND INOCULATION AGAINST INFLUENZA: ICD-10-CM

## 2018-10-29 DIAGNOSIS — G44.209 TENSION HEADACHE: Primary | ICD-10-CM

## 2018-10-29 DIAGNOSIS — M62.838 NECK MUSCLE SPASM: ICD-10-CM

## 2018-10-29 PROCEDURE — 99214 OFFICE O/P EST MOD 30 MIN: CPT | Mod: 25 | Performed by: INTERNAL MEDICINE

## 2018-10-29 PROCEDURE — 90471 IMMUNIZATION ADMIN: CPT | Performed by: INTERNAL MEDICINE

## 2018-10-29 PROCEDURE — 90682 RIV4 VACC RECOMBINANT DNA IM: CPT | Performed by: INTERNAL MEDICINE

## 2018-10-29 RX ORDER — CYCLOBENZAPRINE HCL 10 MG
5-10 TABLET ORAL
Qty: 30 TABLET | Refills: 0 | Status: SHIPPED | OUTPATIENT
Start: 2018-10-29 | End: 2019-03-08

## 2018-10-29 NOTE — MR AVS SNAPSHOT
"              After Visit Summary   10/29/2018    Brii Taylor    MRN: [id number]           Patient Information     Date Of Birth          1965        Visit Information        Provider Department      10/29/2018 2:50 PM [facility]        Today's Diagnoses     Need for prophylactic vaccination and inoculation against influenza    -  1    Neck muscle spasm          Care Instructions    For headaches:    1. Try to stop Excedrin daily. This can cause \"rebound headaches\".   2. Get eyes examined.  3. Stay well hydrated during the day.  4. OK to try OCCASIONAL muscle relaxer or heat to neck at night time.    Come back if symptoms still persist in 2-3 weeks and we can talk about brain MRI and daily medication to reduce headache frequency.           Follow-ups after your visit        Who to contact     If you have questions or need follow up information about today's clinic visit or your schedule please contact [facility] directly at [phone].  Normal or non-critical lab and imaging results will be communicated to you by Sentillionhart, letter or phone within 4 business days after the clinic has received the results. If you do not hear from us within 7 days, please contact the clinic through GetYourGuidet or phone. If you have a critical or abnormal lab result, we will notify you by phone as soon as possible.  Submit refill requests through IPG or call your pharmacy and they will forward the refill request to us. Please allow 3 business days for your refill to be completed.          Additional Information About Your Visit        Sent[employer]anghai [employer] Information     IPG gives you secure access to your electronic health record. If you see a primary care provider, you can also send messages to your care team and make appointments. If you have questions, please call your primary care clinic.  If you do not have a primary care provider, please call [phone] and they will assist you.   " "     Care EveryWhere ID     This is your Care EveryWhere ID. This could be used by other organizations to access your [place] medical records  [id number]        Your Vitals Were     Pulse Temperature Height Pulse Oximetry BMI (Body Mass Index)       76 98.1  F (36.7  C) (Tympanic) 5' 5\" (1.651 m) 97% 24.63 kg/m2        Blood Pressure from Last 3 Encounters:   10/29/18 110/64   04/12/18 110/70   12/18/17 119/69    Weight from Last 3 Encounters:   10/29/18 148 lb (67.1 kg)   04/12/18 138 lb 8 oz (62.8 kg)   12/18/17 135 lb (61.2 kg)              We Performed the Following     FLU VACCINE, (RIV4) RECOMBINANT HA  , IM (FluBlok, egg free) [92884]- >18 YRS ([facility] recommended  50-64 YRS)     Vaccine Administration, Initial [50902]          Today's Medication Changes          These changes are accurate as of 10/29/18  3:42 PM.  If you have any questions, ask your nurse or doctor.               These medicines have changed or have updated prescriptions.        Dose/Directions    cyclobenzaprine 10 MG tablet   Commonly known as:  FLEXERIL   This may have changed:  when to take this   Used for:  Neck muscle spasm   Changed by:  Fariba Bryson MD        Dose:  5-10 mg   Take 0.5-1 tablets (5-10 mg) by mouth nightly as needed for muscle spasms   Quantity:  30 tablet   Refills:  0            Where to get your medicines      These medications were sent to [facility] [address] [facility] [address] [facility] [address]  [address] [facility] [address]     Phone:  [phone]     cyclobenzaprine 10 MG tablet                Primary Care Provider Office Phone # Fax #    Fariba Bryson [phone] [address] [place] [address]        Equal Access to Services     [facility] JERICHO AH: Claudio mehta Somigel, waaxda luqadaha, qaybta kaalmaaj simmons, kaylie fernandez ah. So wac [phone].    ATENCIÓN: Si taryn saavedra a samaniego " disposición servicios gratuitos de asistencia lingüística. Bettie encarnacion [phone].    We comply with applicable federal civil rights laws and [place] laws. We do not discriminate on the basis of race, color, national origin, age, disability, sex, sexual orientation, or gender identity.            Thank you!     Thank you for choosing [facility]  for your care. Our goal is always to provide you with excellent care. Hearing back from our patients is one way we can continue to improve our services. Please take a few minutes to complete the written survey that you may receive in the mail after your visit with us. Thank you!             Your Updated Medication List - Protect others around you: Learn how to safely use, store and throw away your medicines at www.disposemymeds.org.          This list is accurate as of 10/29/18  3:42 PM.  Always use your most recent med list.                   Brand Name Dispense Instructions for use Diagnosis    acetaminophen-caffeine 500-65 MG Tabs    EXCEDRIN TENSION HEADACHE     Take 2 tablets by mouth every 6 hours as needed for mild pain        cyclobenzaprine 10 MG tablet    FLEXERIL    30 tablet    Take 0.5-1 tablets (5-10 mg) by mouth nightly as needed for muscle spasms    Neck muscle spasm       NORTREL 1/35 (28) 1-35 MG-MCG per tablet   Generic drug:  norethindrone-ethinyl estradiol      Take 1-35 mcg by mouth daily        ZOLOFT 25 MG tablet   Generic drug:  sertraline      Take  by mouth daily. 1 1/2 tab daily-37 mg

## 2018-10-29 NOTE — PATIENT INSTRUCTIONS
"For headaches:    1. Try to stop Excedrin daily. This can cause \"rebound headaches\".   2. Get eyes examined.  3. Stay well hydrated during the day.  4. OK to try OCCASIONAL muscle relaxer or heat to neck at night time.    Come back if symptoms still persist in 2-3 weeks and we can talk about brain MRI and daily medication to reduce headache frequency.   "

## 2018-10-29 NOTE — PROGRESS NOTES
SUBJECTIVE:   Brii Taylor is a 53 year old female who presents to clinic today for the following health issues:      Headaches      Duration: x1 month     Description  Location: bilateral in the frontal area, bilateral in the occipital area   Character: dull pain, squeezing pain  Frequency:  Daily   Duration:  continuous     Intensity:  moderate    Accompanying signs and symptoms:    Precipitating or Alleviating factors:  Nausea/vomiting: occasionally  Dizziness: no  Weakness or numbness: no  Visual changes: blind spots (scotoma)  Fever: no   Sinus or URI symptoms YES- possible sinus infection     History  Head trauma: no   Family history of migraines: YES  Previous tests for headaches: YES  Neurologist evaluations: YES  Able to do daily activities when headache present: YES  Wake with headaches: YES  Daily pain medication use: YES- Excedrin   Any changes in: new job this past year     Precipitating or Alleviating factors (light/sound/sleep/caffeine): light and sound makes it worse    Therapies tried and outcome: Excedrin    Outcome - usually effective  Frequent/daily pain medication use: no     Brii comes in for evaluation of daily headaches for the past month. She reports that she will often wake up with headaches. Describes the pain as typically a band of headache wrapping around head toward the back. Headaches only seem to improve with medication (typically takes Excedrin with good results), otherwise will get worse during the day. Some nausea, but no vomiting. Not waking her from sleep. No numbness or weakness. Does have some persistent neck issues, which she feels may be contributing, particularly based on how she sleeps.    She does use cheaters now, and can't remember when her last eye exam was. Thinks it was well over a year ago. Trying to be better hydrated throughout the day. No balance issues. Some lightheadedness with headaches.     Seen in urgent care last week for headaches and sinus  "symptoms, was put on a course of azithromycin without any improvement in her headaches.     Problem list and histories reviewed & adjusted, as indicated.  Additional history: as documented    Patient Active Problem List   Diagnosis     Contraception     Anxiety     CARDIOVASCULAR SCREENING; LDL GOAL LESS THAN 160     Onychomycosis     Finger pain, right     Plantar warts     Pulmonary nodules     Mitral valve prolapse     Past Surgical History:   Procedure Laterality Date     COLONOSCOPY Left 12/18/2017    Procedure: COLONOSCOPY;  Colonoscopy; difficulty in advancing scope (tight corner) so used biopsy forcep to follow/ advance scope;  Surgeon: Babar Gramajo MD;  Location:  GI     TONSILLECTOMY         Social History   Substance Use Topics     Smoking status: Never Smoker     Smokeless tobacco: Never Used     Alcohol use No     Family History   Problem Relation Age of Onset     Hypertension Mother      Cancer Mother 70     nonHodgkins lymphoma     Psychotic Disorder Mother      anxiety     Alzheimer Disease Father      Breast Cancer Sister      breast ca age 38     GASTROINTESTINAL DISEASE Brother      crohns or colitis     Colon Cancer No family hx of            Reviewed and updated as needed this visit by clinical staff  Tobacco  Allergies  Meds  Med Hx  Fam Hx  Soc Hx      ROS:  Constitutional, HEENT, GI, , neuro systems are negative, except as otherwise noted.    OBJECTIVE:     /64 (BP Location: Right arm, Patient Position: Chair, Cuff Size: Adult Regular)  Pulse 76  Temp 98.1  F (36.7  C) (Tympanic)  Ht 5' 5\" (1.651 m)  Wt 148 lb (67.1 kg)  SpO2 97%  BMI 24.63 kg/m2  Body mass index is 24.63 kg/(m^2).  GENERAL: healthy, alert and no distress  EYES: Eyes grossly normal to inspection, PERRL and conjunctivae and sclerae normal  HENT: ear canals and TM's normal, nose and mouth without ulcers or lesions  NECK: no adenopathy, no asymmetry, masses, or scars and thyroid normal to " "palpation  NEURO: Normal strength and tone, sensory exam grossly normal, mentation intact, speech normal, cranial nerves 2-12 intact, DTR's normal and symmetric, gait normal including heel/toe/tandem walking, Romberg normal and rapid alternating movements normal    ASSESSMENT/PLAN:     1. Tension headache  Headaches, while daily, seem most consistent with tension headaches. Could be triggered by vision changes, poor hydration, neck tension, or rebound headache from overuse of OTC analgesics. No red flags at this time; will start with conservative interventions. See PI. If not improving with these, patient will follow-up and will consider head imaging and prophylactic medication at that time.     2. Neck muscle spasm  Could be triggering headaches. Will try muscle relaxant at night time. Reviewed medication risks.   - cyclobenzaprine (FLEXERIL) 10 MG tablet; Take 0.5-1 tablets (5-10 mg) by mouth nightly as needed for muscle spasms  Dispense: 30 tablet; Refill: 0    3. Need for prophylactic vaccination and inoculation against influenza  - Vaccine Administration, Initial [55981]  - FLU VACCINE, (RIV4) RECOMBINANT HA  , IM (FluBlok, egg free) [96230]- >18 YRS ([facility] recommended  50-64 YRS)    Patient Instructions   For headaches:    1. Try to stop Excedrin daily. This can cause \"rebound headaches\".   2. Get eyes examined.  3. Stay well hydrated during the day.  4. OK to try OCCASIONAL muscle relaxer or heat to neck at night time.    Come back if symptoms still persist in 2-3 weeks and we can talk about brain MRI and daily medication to reduce headache frequency.       Fariba Orosco MD  [facility]    Injectable Influenza Immunization Documentation    1.  Is the person to be vaccinated sick today?   No    2. Does the person to be vaccinated have an allergy to a component   of the vaccine?   No  Egg Allergy Algorithm Link    3. Has the person to be vaccinated ever had a serious reaction   to influenza vaccine " in the past?   No    4. Has the person to be vaccinated ever had Guillain-Barré syndrome?   No    Form completed by Jazz Hoffman MA 10/29/2018 3:04 PM

## 2019-03-08 ENCOUNTER — OFFICE VISIT (OUTPATIENT)
Dept: INTERNAL MEDICINE | Facility: CLINIC | Age: 54
End: 2019-03-08
Payer: COMMERCIAL

## 2019-03-08 VITALS
TEMPERATURE: 98.3 F | WEIGHT: 142.6 LBS | SYSTOLIC BLOOD PRESSURE: 130 MMHG | BODY MASS INDEX: 23.76 KG/M2 | DIASTOLIC BLOOD PRESSURE: 80 MMHG | OXYGEN SATURATION: 98 % | HEART RATE: 82 BPM | RESPIRATION RATE: 18 BRPM | HEIGHT: 65 IN

## 2019-03-08 DIAGNOSIS — M19.90 ARTHRITIS: Primary | ICD-10-CM

## 2019-03-08 LAB
BASOPHILS # BLD AUTO: 0 10E9/L (ref 0–0.2)
BASOPHILS NFR BLD AUTO: 0.6 %
DIFFERENTIAL METHOD BLD: NORMAL
EOSINOPHIL # BLD AUTO: 0.1 10E9/L (ref 0–0.7)
EOSINOPHIL NFR BLD AUTO: 2.4 %
ERYTHROCYTE [DISTWIDTH] IN BLOOD BY AUTOMATED COUNT: 12.9 % (ref 10–15)
HCT VFR BLD AUTO: 40.2 % (ref 35–47)
HGB BLD-MCNC: 13.8 G/DL (ref 11.7–15.7)
LYMPHOCYTES # BLD AUTO: 2 10E9/L (ref 0.8–5.3)
LYMPHOCYTES NFR BLD AUTO: 36 %
MCH RBC QN AUTO: 29.6 PG (ref 26.5–33)
MCHC RBC AUTO-ENTMCNC: 34.3 G/DL (ref 31.5–36.5)
MCV RBC AUTO: 86 FL (ref 78–100)
MONOCYTES # BLD AUTO: 0.4 10E9/L (ref 0–1.3)
MONOCYTES NFR BLD AUTO: 7.6 %
NEUTROPHILS # BLD AUTO: 2.9 10E9/L (ref 1.6–8.3)
NEUTROPHILS NFR BLD AUTO: 53.4 %
PLATELET # BLD AUTO: 293 10E9/L (ref 150–450)
RBC # BLD AUTO: 4.67 10E12/L (ref 3.8–5.2)
WBC # BLD AUTO: 5.4 10E9/L (ref 4–11)

## 2019-03-08 PROCEDURE — 86200 CCP ANTIBODY: CPT | Performed by: INTERNAL MEDICINE

## 2019-03-08 PROCEDURE — 36415 COLL VENOUS BLD VENIPUNCTURE: CPT | Performed by: INTERNAL MEDICINE

## 2019-03-08 PROCEDURE — 86038 ANTINUCLEAR ANTIBODIES: CPT | Performed by: INTERNAL MEDICINE

## 2019-03-08 PROCEDURE — 86140 C-REACTIVE PROTEIN: CPT | Performed by: INTERNAL MEDICINE

## 2019-03-08 PROCEDURE — 84443 ASSAY THYROID STIM HORMONE: CPT | Performed by: INTERNAL MEDICINE

## 2019-03-08 PROCEDURE — 80053 COMPREHEN METABOLIC PANEL: CPT | Performed by: INTERNAL MEDICINE

## 2019-03-08 PROCEDURE — 85025 COMPLETE CBC W/AUTO DIFF WBC: CPT | Performed by: INTERNAL MEDICINE

## 2019-03-08 PROCEDURE — 86431 RHEUMATOID FACTOR QUANT: CPT | Performed by: INTERNAL MEDICINE

## 2019-03-08 PROCEDURE — 99214 OFFICE O/P EST MOD 30 MIN: CPT | Performed by: INTERNAL MEDICINE

## 2019-03-08 PROCEDURE — 84550 ASSAY OF BLOOD/URIC ACID: CPT | Performed by: INTERNAL MEDICINE

## 2019-03-08 RX ORDER — NORETHINDRONE
0.35 KIT DAILY
Refills: 3 | COMMUNITY
Start: 2019-02-28 | End: 2019-12-30

## 2019-03-08 ASSESSMENT — MIFFLIN-ST. JEOR: SCORE: 1257.71

## 2019-03-08 NOTE — PROGRESS NOTES
SUBJECTIVE:   Brii Taylor is a 53 year old female who presents to clinic today for the following health issues:      HPI:   She is here concerned that she has an inflammatory arthritis. Her sister has Lupus and/or ankylosing spondylitis. For years Brii has had intermittent low back pain after what sounds like a disk herniation or injury. In the past few months she developed left sided neck pain worse with certain movements. Worse when she turns to the left and at times what feels like nerve pain going down the arm into the wrist. Denies any weakness, numbness or tingling in the left.    Her hands are generally stiff in the morning but not specific joint pain, swelling or erythema in the hands. Right knee will occasionally be stiff but again no swelling or erythema. She has an appointment for Rheumatology in about a month but decided she would like to be seen sooner.    Denies any fever chills or night sweats. No skin rashes. No symptoms of raynaud's or GERD. No problem with dry mouth or dry eyes. Appetite is good and weight is stable. Denies fatigue. denies depression or anxiety.       Problem list and histories reviewed & adjusted, as indicated.  Additional history: as documented    BP Readings from Last 3 Encounters:   03/08/19 130/80   10/29/18 110/64   04/12/18 110/70    Wt Readings from Last 3 Encounters:   03/08/19 64.7 kg (142 lb 9.6 oz)   10/29/18 67.1 kg (148 lb)   04/12/18 62.8 kg (138 lb 8 oz)                    Reviewed and updated as needed this visit by clinical staff  Tobacco  Allergies  Meds  Med Hx  Surg Hx  Fam Hx  Soc Hx      Reviewed and updated as needed this visit by Provider         ROS:  Constitutional, HEENT, cardiovascular, pulmonary, GI, , musculoskeletal, neuro, skin, endocrine and psych systems are negative, except as otherwise noted.    OBJECTIVE:     /80 (BP Location: Right arm, Patient Position: Sitting, Cuff Size: Adult Regular)   Pulse 82   Temp 98.3  F  "(36.8  C) (Oral)   Resp 18   Ht 1.651 m (5' 5\")   Wt 64.7 kg (142 lb 9.6 oz)   SpO2 98%   BMI 23.73 kg/m    Body mass index is 23.73 kg/m .  GENERAL: healthy, alert and no distress  EYES: Eyes grossly normal to inspection, PERRL and conjunctivae and sclerae normal  HENT: ear canals and TM's normal, nose and mouth without ulcers or lesions  NECK: no adenopathy, no asymmetry, masses, or scars and thyroid normal to palpation  RESP: lungs clear to auscultation - no rales, rhonchi or wheezes  CV: regular rate and rhythm, normal S1 S2, no S3 or S4, no murmur, click or rub, no peripheral edema and peripheral pulses strong  ABDOMEN: soft, nontender, no hepatosplenomegaly, no masses and bowel sounds normal  MS: neck good range of motion left trapezius seems tight. Reflexes are full and symmetric. Strength is full and symmetric. Hand, wrist, elbow and knee joints without swelling, erythema tenderness or bogginess.      ASSESSMENT/PLAN:       1. Arthritis  I think she has some DJD in her neck. She is already seeing chiropractor and he has even recommended PT. I agree with that. Her hand and knee stiffness also seems Most consistent with DJD. However given her family history of inflammatory arthritis will go ahead and get screening labs listed below.  - Cyclic Citrullinated Peptide Antibody IgG  - RHEUMATOID FACTOR  - Anti Nuclear Anisa IgG by IFA with Reflex  - CRP INFLAMMATION  - URIC ACID  - CBC with platelets and differential  - TSH with free T4 reflex  - Comprehensive metabolic panel (BMP + Alb, Alk Phos, ALT, AST, Total. Bili, TP)    Follow up PRN.     Ct Arauz MD  [facility]    "

## 2019-03-09 LAB
ALBUMIN SERPL-MCNC: 4.5 G/DL (ref 3.4–5)
ALP SERPL-CCNC: 58 U/L (ref 40–150)
ALT SERPL W P-5'-P-CCNC: 33 U/L (ref 0–50)
ANION GAP SERPL CALCULATED.3IONS-SCNC: 7 MMOL/L (ref 3–14)
AST SERPL W P-5'-P-CCNC: 36 U/L (ref 0–45)
BILIRUB SERPL-MCNC: 0.6 MG/DL (ref 0.2–1.3)
BUN SERPL-MCNC: 16 MG/DL (ref 7–30)
CALCIUM SERPL-MCNC: 9.4 MG/DL (ref 8.5–10.1)
CHLORIDE SERPL-SCNC: 105 MMOL/L (ref 94–109)
CO2 SERPL-SCNC: 26 MMOL/L (ref 20–32)
CREAT SERPL-MCNC: 0.65 MG/DL (ref 0.52–1.04)
CRP SERPL-MCNC: 3.1 MG/L (ref 0–8)
GFR SERPL CREATININE-BSD FRML MDRD: >90 ML/MIN/{1.73_M2}
GLUCOSE SERPL-MCNC: 85 MG/DL (ref 70–99)
POTASSIUM SERPL-SCNC: 3.7 MMOL/L (ref 3.4–5.3)
PROT SERPL-MCNC: 7.8 G/DL (ref 6.8–8.8)
SODIUM SERPL-SCNC: 138 MMOL/L (ref 133–144)
TSH SERPL DL<=0.005 MIU/L-ACNC: 2.26 MU/L (ref 0.4–4)
URATE SERPL-MCNC: 3.6 MG/DL (ref 2.6–6)

## 2019-03-11 LAB
CCP AB SER IA-ACNC: 1 U/ML
RHEUMATOID FACT SER NEPH-ACNC: <20 IU/ML (ref 0–20)

## 2019-03-12 LAB — ANA SER QL IF: NEGATIVE

## 2019-04-08 NOTE — PROGRESS NOTES
[place] - Rheumatology Clinic Visit     Brii Taylor MRN# [id number]   YOB: 1965    Primary care provider: Fariba Bryson  Apr 9, 2019          Assessment and Plan:   # Mild chronic low back pain and stiffness X > 10 years  # Family history of ulcerative colitis and ankylosing spondylitis    Basic blood cell counts, liver and kidney function labs within normal limits  CRP within normal limits  Uric acid within normal limits  Antibodies for rheumatoid arthritis, lupus and some related conditions are negative.   TSH within normal limits    No buttock area pain but there seems to be some inflammatory component for her mild low back pain which has been chronic. We will check HLA B27. If she develops SI joint pain or if HLA B27 comes back positive, we will do imaging. At present, patient can continue to use OTC tylenol or NSAID as needed.     The labs from patient records are reviewed.     I will be back in touch with the patient through mychart/letter when results are available.     Patient agrees with the above mentioned treatment plan.     Most Recent Immunizations   Administered Date(s) Administered     HEPA 02/04/2003     Influenza (IIV3) PF 10/12/2015     Influenza Quad, Recombinant, p-free (RIV4) 10/29/2018     TD (ADULT, 7+) 02/04/2003     TDAP Vaccine (Adacel) 01/08/2013       No orders of the defined types were placed in this encounter.      Data Unavailable    There are no discontinued medications.  Current Outpatient Medications   Medication Sig Dispense Refill     acetaminophen-caffeine (EXCEDRIN TENSION HEADACHE) 500-65 MG TABS Take 2 tablets by mouth every 6 hours as needed for mild pain       NORLYDA 0.35 MG tablet Take 0.35 mg by mouth daily  3     sertraline (ZOLOFT) 25 MG tablet Take  by mouth daily. 1 1/2 tab daily-37 mg         Marycarmen Waller [facility]          Active Problem List:     Patient Active Problem List    Diagnosis Date Noted     Mitral valve  prolapse 03/17/2017     Priority: Medium     Pulmonary nodules 03/13/2017     Priority: Medium     Onychomycosis 01/08/2013     Priority: Medium     Finger pain, right 01/08/2013     Priority: Medium     Plantar warts 01/08/2013     Priority: Medium     CARDIOVASCULAR SCREENING; LDL GOAL LESS THAN 160 10/31/2010     Priority: Medium     Contraception 05/29/2009     Priority: Medium     Anxiety 05/29/2009     Priority: Medium            History of Present Illness:   No chief complaint on file.    April 8, 2019  Have you ever seen a rheumatologist No Who NA When NA  Joint pain history  Onset: years  Involved joints: Lower back, left side of neck. Joint pain in hands has been on and off. No pressure pain, just an overall achey pain. Sister has anklelosing spondylitis and has a lot of other auto immune issues, and suggested she get that looked into as a possibility.   Pain scale:  4/10     Wakes the patient from sleep : Yes  Morning stiffness:Yes for 60 minutes  Meds used: None     Interim history  Since last visit:  1. Infections - No  2. New symptoms/medical problem - No  3. Any side effects from Rheum medications -NA  3. ER visits/Hospitalizations/surgeries - No  4. Last PCP visit: 4/12/18    Wt Readings from Last 4 Encounters:   03/08/19 64.7 kg (142 lb 9.6 oz)   10/29/18 67.1 kg (148 lb)   04/12/18 62.8 kg (138 lb 8 oz)   12/18/17 61.2 kg (135 lb)     No h/o unintentional weight loss, fevers, rash, swollen glands  No family or personal history of psoriasis,  or chron's disease. No h/o iritis or glaucoma.  Patient denies any raynauds  No h/o persistent shortness of breath, cough, chest pain  No h/o persistent vomiting, diarrhea, abdominal pain  No h/o hematochezia, hematuria, hemoptysis  No h/o seizures      BP Readings from Last 3 Encounters:   03/08/19 130/80   10/29/18 110/64   04/12/18 110/70              Review of Systems:   Complete ROS negative except for symptoms mentioned in the HPI          Past Medical  History:     Past Medical History:   Diagnosis Date     Anxiety     sees psychiatrist     Migraine, unspecified, without mention of intractable migraine without mention of status migrainosus      Sleep disturbance, unspecified     hx sleep apnea     Viral warts, unspecified      Past Surgical History:   Procedure Laterality Date     COLONOSCOPY Left 12/18/2017    Procedure: COLONOSCOPY;  Colonoscopy; difficulty in advancing scope (tight corner) so used biopsy forcep to follow/ advance scope;  Surgeon: Babar Gramajo MD;  Location:  GI     [facility]              Social History:     Social History     Occupational History     Not on file   Tobacco Use     Smoking status: Never Smoker     Smokeless tobacco: Never Used   Substance and Sexual Activity     Alcohol use: No     Drug use: No     Sexual activity: Yes     Partners: Male     Birth control/protection: Pill            Family History:     Family History   Problem Relation Age of Onset     Hypertension Mother      Cancer Mother 70        nonHodgkins lymphoma     Psychotic Disorder Mother         anxiety     Alzheimer Disease Father      Breast Cancer Sister         breast ca age 38     Gastrointestinal Disease Brother         crohns or colitis     No Known Problems Daughter      Colon Cancer No family hx of             Allergies:     Allergies   Allergen Reactions     Penicillins      PN: LW Reaction: rash            Medications:     Current Outpatient Medications   Medication Sig Dispense Refill     acetaminophen-caffeine (EXCEDRIN TENSION HEADACHE) 500-65 MG TABS Take 2 tablets by mouth every 6 hours as needed for mild pain       NORLYDA 0.35 MG tablet Take 0.35 mg by mouth daily  3     sertraline (ZOLOFT) 25 MG tablet Take  by mouth daily. 1 1/2 tab daily-37 mg              Physical Exam:   not currently breastfeeding.  Wt Readings from Last 4 Encounters:   03/08/19 64.7 kg (142 lb 9.6 oz)   10/29/18 67.1 kg (148 lb)   04/12/18 62.8 kg (138 lb 8 oz)    12/18/17 61.2 kg (135 lb)       Constitutional: well-developed, appearing stated age; cooperative  Eyes: normal conjunctiva, sclera  ENT: nl external ears, nose, lips.No mucous membrane lesions, normal saliva pool  Neck: no cervical lymphadenopathy  Resp: lungs clear to auscultation in the bases,   CV: RRR, no added sounds  GI: Abdomen soft and no tenderness  : not tested  Lymph: no cervical, supraclavicular or epitrochlear nodes  Spine: No lumbar spine tenderness; no SI Joint tenderness; lumbar ROM good but slowly; cervical ROM good.   MS: All shoulder, elbow, wrist, MCP/PIP/DIP, hip, knee, ankle, and foot MTP/IP joints were examined and  found without active synovitis or major deformity. Full ROM.  No dactylitis,  tenosynovitis, enthesopathy.  Skin: no rash in exposed areas  Psych: nl judgement, orientation, memory, affect.         Data:         Marycarmen Waller, [facility]

## 2019-04-09 ENCOUNTER — OFFICE VISIT (OUTPATIENT)
Dept: RHEUMATOLOGY | Facility: CLINIC | Age: 54
End: 2019-04-09
Payer: COMMERCIAL

## 2019-04-09 VITALS
OXYGEN SATURATION: 97 % | TEMPERATURE: 98.9 F | BODY MASS INDEX: 22.47 KG/M2 | HEART RATE: 69 BPM | SYSTOLIC BLOOD PRESSURE: 126 MMHG | RESPIRATION RATE: 16 BRPM | WEIGHT: 135 LBS | DIASTOLIC BLOOD PRESSURE: 74 MMHG

## 2019-04-09 DIAGNOSIS — M54.50 CHRONIC MIDLINE LOW BACK PAIN WITHOUT SCIATICA: Primary | ICD-10-CM

## 2019-04-09 DIAGNOSIS — G89.29 CHRONIC MIDLINE LOW BACK PAIN WITHOUT SCIATICA: Primary | ICD-10-CM

## 2019-04-09 PROCEDURE — 81374 HLA I TYPING 1 ANTIGEN LR: CPT | Performed by: INTERNAL MEDICINE

## 2019-04-09 PROCEDURE — 36415 COLL VENOUS BLD VENIPUNCTURE: CPT | Performed by: INTERNAL MEDICINE

## 2019-04-09 PROCEDURE — 99204 OFFICE O/P NEW MOD 45 MIN: CPT | Performed by: INTERNAL MEDICINE

## 2019-04-09 ASSESSMENT — ROUTINE ASSESSMENT OF PATIENT INDEX DATA (RAPID3)
TOTAL RAPID3 SCORE: 9.3
RAPID3 INTERPRETATION: MODERATE 6.1-12.0

## 2019-04-09 NOTE — NURSING NOTE
"Chief Complaint   Patient presents with     Consult       Initial There were no vitals taken for this visit. Estimated body mass index is 23.73 kg/m  as calculated from the following:    Height as of 3/8/19: 1.651 m (5' 5\").    Weight as of 3/8/19: 64.7 kg (142 lb 9.6 oz).  Medication Reconciliation: complete    Have you ever seen a rheumatologist No Who NA When NA  Joint pain history  Onset: years  Involved joints: Lower back, left side of neck. Joint pain in hands has been on and off. No pressure pain, just an overall achey pain. Sister has anklelosing spondylitis and has a lot of other auto immune issues, and suggested she get that looked into as a possibility.   Pain scale:  4/10     Wakes the patient from sleep : Yes  Morning stiffness:Yes for 60 minutes  Meds used: None    Interim history  Since last visit:  1. Infections - No  2. New symptoms/medical problem - No  3. Any side effects from Rheum medications -NA  3. ER visits/Hospitalizations/surgeries - No  4. Last PCP visit: 4/12/18  Wt Readings from Last 4 Encounters:   03/08/19 64.7 kg (142 lb 9.6 oz)   10/29/18 67.1 kg (148 lb)   04/12/18 62.8 kg (138 lb 8 oz)   12/18/17 61.2 kg (135 lb)     BP Readings from Last 3 Encounters:   03/08/19 130/80   10/29/18 110/64   04/12/18 110/70       "

## 2019-04-12 DIAGNOSIS — M54.50 CHRONIC LOW BACK PAIN WITHOUT SCIATICA, UNSPECIFIED BACK PAIN LATERALITY: ICD-10-CM

## 2019-04-12 DIAGNOSIS — Z15.89 HLA B27 (HLA B27 POSITIVE): Primary | ICD-10-CM

## 2019-04-12 DIAGNOSIS — M54.50 CHRONIC MIDLINE LOW BACK PAIN WITHOUT SCIATICA: ICD-10-CM

## 2019-04-12 DIAGNOSIS — G89.29 CHRONIC MIDLINE LOW BACK PAIN WITHOUT SCIATICA: ICD-10-CM

## 2019-04-12 DIAGNOSIS — G89.29 CHRONIC LOW BACK PAIN WITHOUT SCIATICA, UNSPECIFIED BACK PAIN LATERALITY: ICD-10-CM

## 2019-04-12 LAB
B LOCUS: NORMAL
B27TEST METHOD: NORMAL

## 2019-04-12 NOTE — RESULT ENCOUNTER NOTE
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 I recommend getting xray images of your sacroiliac joints to make sure. I have put the order in. Please get them done at a nearby local [facility]. You may call them to set up an appointment.

## 2019-04-26 ENCOUNTER — ANCILLARY PROCEDURE (OUTPATIENT)
Dept: GENERAL RADIOLOGY | Facility: CLINIC | Age: 54
End: 2019-04-26
Attending: INTERNAL MEDICINE
Payer: COMMERCIAL

## 2019-04-26 DIAGNOSIS — Z15.89 HLA B27 (HLA B27 POSITIVE): ICD-10-CM

## 2019-04-26 DIAGNOSIS — M54.50 CHRONIC LOW BACK PAIN WITHOUT SCIATICA, UNSPECIFIED BACK PAIN LATERALITY: ICD-10-CM

## 2019-04-26 DIAGNOSIS — G89.29 CHRONIC LOW BACK PAIN WITHOUT SCIATICA, UNSPECIFIED BACK PAIN LATERALITY: ICD-10-CM

## 2019-04-26 PROCEDURE — 72200 X-RAY EXAM SI JOINTS: CPT | Performed by: INTERNAL MEDICINE

## 2019-05-21 NOTE — PROGRESS NOTES
[place] - Rheumatology Clinic Visit     Brii Taylor MRN# [id number]   YOB: 1965    Primary care provider: Fariba Bryson  May 31, 2019          Assessment and Plan:   # Mild chronic low back pain and stiffness X > 10 years; HLA B27 positivity; Xray SI joint negative  # Family history of ulcerative colitis (brother) and ankylosing spondylitis (sister)    Basic blood cell counts, liver and kidney function labs within normal limits  CRP within normal limits   Uric acid within normal limits  Antibodies for rheumatoid arthritis, lupus and some related conditions are negative.   TSH within normal limits    No buttock area pain but there seems to be some inflammatory component for her mild low back pain which has been chronic. HLA B27 is positive but xray of SI joints is negative.   There is concern for very mild undifferentiated spondyloarthropathy. Does not usually take NSAIDs for back pain. It is at tolerable level. F/u in 1 year or sooner PRN.     The labs from patient records are reviewed.     I will be back in touch with the patient through mychart/letter when results are available.     Patient agrees with the above mentioned treatment plan.     Most Recent Immunizations   Administered Date(s) Administered     HEPA 02/04/2003     Influenza (IIV3) PF 10/12/2015     Influenza Quad, Recombinant, p-free (RIV4) 10/29/2018     TD (ADULT, 7+) 02/04/2003     TDAP Vaccine (Adacel) 01/08/2013       No orders of the defined types were placed in this encounter.      No follow-ups on file.    There are no discontinued medications.  Current Outpatient Medications   Medication Sig Dispense Refill     acetaminophen-caffeine (EXCEDRIN TENSION HEADACHE) 500-65 MG TABS Take 2 tablets by mouth every 6 hours as needed for mild pain       NORLYDA 0.35 MG tablet Take 0.35 mg by mouth daily  3     sertraline (ZOLOFT) 25 MG tablet Take  by mouth daily. 1 1/2 tab daily-37 mg         Willi Lobo,  MD [facility]          Active Problem List:     Patient Active Problem List    Diagnosis Date Noted     Mitral valve prolapse 03/17/2017     Priority: Medium     Pulmonary nodules 03/13/2017     Priority: Medium     Onychomycosis 01/08/2013     Priority: Medium     Finger pain, right 01/08/2013     Priority: Medium     Plantar warts 01/08/2013     Priority: Medium     CARDIOVASCULAR SCREENING; LDL GOAL LESS THAN 160 10/31/2010     Priority: Medium     Contraception 05/29/2009     Priority: Medium     Anxiety 05/29/2009     Priority: Medium            History of Present Illness:     Chief Complaint   Patient presents with     RECHECK     April 8, 2019  Have you ever seen a rheumatologist No Who NA When NA  Joint pain history  Onset: years  Involved joints: Lower back, left side of neck. Joint pain in hands has been on and off. No pressure pain, just an overall achey pain. Sister has anklelosing spondylitis and has a lot of other auto immune issues, and suggested she get that looked into as a possibility.   Pain scale:  4/10     Wakes the patient from sleep : Yes  Morning stiffness:Yes for 60 minutes  Meds used: None     Interim history  Since last visit:  1. Infections - No  2. New symptoms/medical problem - No  3. Any side effects from Rheum medications -NA  3. ER visits/Hospitalizations/surgeries - No  4. Last PCP visit: 4/12/18    Wt Readings from Last 4 Encounters:   05/31/19 60.3 kg (133 lb)   04/09/19 61.2 kg (135 lb)   03/08/19 64.7 kg (142 lb 9.6 oz)   10/29/18 67.1 kg (148 lb)     No h/o unintentional weight loss, fevers, rash, swollen glands  No family or personal history of psoriasis,  or chron's disease. No h/o iritis or glaucoma.  Patient denies any raynauds  No h/o persistent shortness of breath, cough, chest pain  No h/o persistent vomiting, diarrhea, abdominal pain  No h/o hematochezia, hematuria, hemoptysis  No h/o seizures    May 31, 2019  Have you ever seen a rheumatologist yes Who you  When 4/9/19  Joint pain history  Onset: Patient is here for a follow up  Involved joints: low back and neck  Pain scale:  3/10     Wakes the patient from sleep : Yes  Morning stiffness:Yes for 300 minutes  Meds used:excedrin     Interim history  Since last visit:  1. Infections - No  2. New symptoms/medical problem - Yes, shooting right leg pain, and right hip  3. Any side effects from Rheum medications -NA  3. ER visits/Hospitalizations/surgeries - No  4. Last PCP visit: 3/8/19      BP Readings from Last 3 Encounters:   05/31/19 112/68   04/09/19 126/74   03/08/19 130/80              Review of Systems:   Complete ROS negative except for symptoms mentioned in the HPI          Past Medical History:     Past Medical History:   Diagnosis Date     Anxiety     sees psychiatrist     Migraine, unspecified, without mention of intractable migraine without mention of status migrainosus      Sleep disturbance, unspecified     hx sleep apnea     Viral warts, unspecified      Past Surgical History:   Procedure Laterality Date     COLONOSCOPY Left 12/18/2017    Procedure: COLONOSCOPY;  Colonoscopy; difficulty in advancing scope (tight corner) so used biopsy forcep to follow/ advance scope;  Surgeon: Babar Gramajo MD;  Location:  GI     [facility]              Social History:     Social History     Occupational History     Not on file   Tobacco Use     Smoking status: Never Smoker     Smokeless tobacco: Never Used   Substance and Sexual Activity     Alcohol use: No     Drug use: No     Sexual activity: Yes     Partners: Male     Birth control/protection: Pill            Family History:     Family History   Problem Relation Age of Onset     Hypertension Mother      Cancer Mother 70        nonHodgkins lymphoma     Psychotic Disorder Mother         anxiety     Alzheimer Disease Father      Breast Cancer Sister         breast ca age 38     Gastrointestinal Disease Brother         crohns or colitis     No Known Problems  "Daughter      Colon Cancer No family hx of             Allergies:     Allergies   Allergen Reactions     Penicillins      PN: LW Reaction: rash            Medications:     Current Outpatient Medications   Medication Sig Dispense Refill     acetaminophen-caffeine (EXCEDRIN TENSION HEADACHE) 500-65 MG TABS Take 2 tablets by mouth every 6 hours as needed for mild pain       NORLYDA 0.35 MG tablet Take 0.35 mg by mouth daily  3     sertraline (ZOLOFT) 25 MG tablet Take  by mouth daily. 1 1/2 tab daily-37 mg              Physical Exam:   Blood pressure 112/68, pulse 74, height 1.651 m (5' 5\"), weight 60.3 kg (133 lb), SpO2 98 %, not currently breastfeeding.  Wt Readings from Last 4 Encounters:   05/31/19 60.3 kg (133 lb)   04/09/19 61.2 kg (135 lb)   03/08/19 64.7 kg (142 lb 9.6 oz)   10/29/18 67.1 kg (148 lb)       Constitutional: well-developed, appearing stated age; cooperative  Psych: nl judgement, orientation, memory, affect.         Data:         Willi Lobo MD    [facility]    "

## 2019-05-31 ENCOUNTER — OFFICE VISIT (OUTPATIENT)
Dept: RHEUMATOLOGY | Facility: CLINIC | Age: 54
End: 2019-05-31
Payer: COMMERCIAL

## 2019-05-31 VITALS
BODY MASS INDEX: 22.16 KG/M2 | DIASTOLIC BLOOD PRESSURE: 68 MMHG | SYSTOLIC BLOOD PRESSURE: 112 MMHG | OXYGEN SATURATION: 98 % | HEIGHT: 65 IN | WEIGHT: 133 LBS | HEART RATE: 74 BPM

## 2019-05-31 DIAGNOSIS — G89.29 CHRONIC BILATERAL LOW BACK PAIN WITHOUT SCIATICA: ICD-10-CM

## 2019-05-31 DIAGNOSIS — Z15.89 HLA B27 (HLA B27 POSITIVE): Primary | ICD-10-CM

## 2019-05-31 DIAGNOSIS — M54.50 CHRONIC BILATERAL LOW BACK PAIN WITHOUT SCIATICA: ICD-10-CM

## 2019-05-31 PROCEDURE — 99212 OFFICE O/P EST SF 10 MIN: CPT | Performed by: INTERNAL MEDICINE

## 2019-05-31 ASSESSMENT — MIFFLIN-ST. JEOR: SCORE: 1214.16

## 2019-05-31 ASSESSMENT — ROUTINE ASSESSMENT OF PATIENT INDEX DATA (RAPID3)
RAPID3 INTERPRETATION: MODERATE 6.1-12.0
TOTAL RAPID3 SCORE: 7.3

## 2019-05-31 NOTE — NURSING NOTE
"Chief Complaint   Patient presents with     RECHECK       Initial /68   Pulse 74   Ht 1.651 m (5' 5\")   Wt 60.3 kg (133 lb)   SpO2 98%   BMI 22.13 kg/m   Estimated body mass index is 22.13 kg/m  as calculated from the following:    Height as of this encounter: 1.651 m (5' 5\").    Weight as of this encounter: 60.3 kg (133 lb).  Medication Reconciliation: complete    Have you ever seen a rheumatologist yes Who you When 4/9/19  Joint pain history  Onset: Patient is here for a follow up  Involved joints: low back and neck  Pain scale:  3/10     Wakes the patient from sleep : Yes  Morning stiffness:Yes for 300 minutes  Meds used:excedrin    Interim history  Since last visit:  1. Infections - No  2. New symptoms/medical problem - Yes, shooting right leg pain, and right hip  3. Any side effects from Rheum medications -NA  3. ER visits/Hospitalizations/surgeries - No  4. Last PCP visit: 3/8/19  Wt Readings from Last 4 Encounters:   05/31/19 60.3 kg (133 lb)   04/09/19 61.2 kg (135 lb)   03/08/19 64.7 kg (142 lb 9.6 oz)   10/29/18 67.1 kg (148 lb)     BP Readings from Last 3 Encounters:   05/31/19 112/68   04/09/19 126/74   03/08/19 130/80       "

## 2019-09-27 ENCOUNTER — HEALTH MAINTENANCE LETTER (OUTPATIENT)
Age: 54
End: 2019-09-27

## 2019-11-04 ENCOUNTER — OFFICE VISIT (OUTPATIENT)
Dept: INTERNAL MEDICINE | Facility: CLINIC | Age: 54
End: 2019-11-04
Payer: COMMERCIAL

## 2019-11-04 ENCOUNTER — ANCILLARY PROCEDURE (OUTPATIENT)
Dept: GENERAL RADIOLOGY | Facility: CLINIC | Age: 54
End: 2019-11-04
Attending: INTERNAL MEDICINE
Payer: COMMERCIAL

## 2019-11-04 VITALS
HEIGHT: 65 IN | BODY MASS INDEX: 22.64 KG/M2 | HEART RATE: 74 BPM | OXYGEN SATURATION: 97 % | SYSTOLIC BLOOD PRESSURE: 136 MMHG | WEIGHT: 135.9 LBS | RESPIRATION RATE: 20 BRPM | DIASTOLIC BLOOD PRESSURE: 80 MMHG | TEMPERATURE: 98.2 F

## 2019-11-04 DIAGNOSIS — G25.1 DRUG-INDUCED TREMOR: ICD-10-CM

## 2019-11-04 DIAGNOSIS — M54.42 ACUTE BILATERAL LOW BACK PAIN WITH LEFT-SIDED SCIATICA: ICD-10-CM

## 2019-11-04 DIAGNOSIS — M54.41 BILATERAL LOW BACK PAIN WITH RIGHT-SIDED SCIATICA, UNSPECIFIED CHRONICITY: Primary | ICD-10-CM

## 2019-11-04 DIAGNOSIS — Z23 NEED FOR PROPHYLACTIC VACCINATION AND INOCULATION AGAINST INFLUENZA: ICD-10-CM

## 2019-11-04 DIAGNOSIS — F41.9 ANXIETY: ICD-10-CM

## 2019-11-04 PROCEDURE — 99214 OFFICE O/P EST MOD 30 MIN: CPT | Mod: 25 | Performed by: INTERNAL MEDICINE

## 2019-11-04 PROCEDURE — 90686 IIV4 VACC NO PRSV 0.5 ML IM: CPT | Performed by: INTERNAL MEDICINE

## 2019-11-04 PROCEDURE — 72100 X-RAY EXAM L-S SPINE 2/3 VWS: CPT

## 2019-11-04 PROCEDURE — 90471 IMMUNIZATION ADMIN: CPT | Performed by: INTERNAL MEDICINE

## 2019-11-04 RX ORDER — SERTRALINE HYDROCHLORIDE 100 MG/1
100 TABLET, FILM COATED ORAL DAILY
COMMUNITY
Start: 2019-11-04 | End: 2019-11-04

## 2019-11-04 RX ORDER — SERTRALINE HYDROCHLORIDE 100 MG/1
100 TABLET, FILM COATED ORAL DAILY
Qty: 90 TABLET | Refills: 3 | Status: SHIPPED | OUTPATIENT
Start: 2019-11-04 | End: 2019-11-05

## 2019-11-04 ASSESSMENT — MIFFLIN-ST. JEOR: SCORE: 1222.32

## 2019-11-04 ASSESSMENT — ANXIETY QUESTIONNAIRES
2. NOT BEING ABLE TO STOP OR CONTROL WORRYING: NOT AT ALL
1. FEELING NERVOUS, ANXIOUS, OR ON EDGE: SEVERAL DAYS
IF YOU CHECKED OFF ANY PROBLEMS ON THIS QUESTIONNAIRE, HOW DIFFICULT HAVE THESE PROBLEMS MADE IT FOR YOU TO DO YOUR WORK, TAKE CARE OF THINGS AT HOME, OR GET ALONG WITH OTHER PEOPLE: SOMEWHAT DIFFICULT
5. BEING SO RESTLESS THAT IT IS HARD TO SIT STILL: NOT AT ALL
7. FEELING AFRAID AS IF SOMETHING AWFUL MIGHT HAPPEN: NOT AT ALL
GAD7 TOTAL SCORE: 3
6. BECOMING EASILY ANNOYED OR IRRITABLE: SEVERAL DAYS
3. WORRYING TOO MUCH ABOUT DIFFERENT THINGS: SEVERAL DAYS

## 2019-11-04 ASSESSMENT — PATIENT HEALTH QUESTIONNAIRE - PHQ9
SUM OF ALL RESPONSES TO PHQ QUESTIONS 1-9: 1
5. POOR APPETITE OR OVEREATING: NOT AT ALL

## 2019-11-04 NOTE — PROGRESS NOTES
"Subjective     Brii Taylor is a 54 year old female who presents to clinic today for the following health issues:    HPI     Back problems for 2 years but now right leg pain x 2 months. Follow up anxiety.    She has a history of mild scoliosis and has been worked up for ankylosing spondylitis and was not felt to have it. in the past 6 months she has developed intermittent pain down her right lateral leg. Is better with standing or walking worse with sitting or laying down. Denies weakness or numbness in the leg. Denies bowel or bladder changes. Denies any trauma to the area or unusual physical activities that could have caused it. 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      She has a long history of anxiety for which she has been on Zoloft  For years. Currently on 100 mg. Is under good control. She has a slight tremor from the zoloft but has not wanted to change. Her Psychiatrist recently left and she would like a refill.     BP Readings from Last 3 Encounters:   11/04/19 136/80   05/31/19 112/68   04/09/19 126/74    Wt Readings from Last 3 Encounters:   11/04/19 61.6 kg (135 lb 14.4 oz)   05/31/19 60.3 kg (133 lb)   04/09/19 61.2 kg (135 lb)                 Reviewed and updated as needed this visit by Provider         Review of Systems   ROS COMP: Constitutional, HEENT, cardiovascular, pulmonary, GI, , musculoskeletal, neuro, skin, endocrine and psych systems are negative, except as otherwise noted.      Objective    /80 (BP Location: Left arm, Patient Position: Chair, Cuff Size: Adult Large)   Pulse 74   Temp 98.2  F (36.8  C) (Oral)   Resp 20   Ht 1.651 m (5' 5\")   Wt 61.6 kg (135 lb 14.4 oz)   LMP 09/04/2019 (LMP Unknown)   SpO2 97%   Breastfeeding? No   BMI 22.61 kg/m    Body mass index is 22.61 kg/m .  Physical Exam   GENERAL: healthy, alert and no distress  NECK: no adenopathy, no asymmetry, masses, or scars and thyroid normal to palpation  RESP: lungs clear to auscultation - " no rales, rhonchi or wheezes  CV: regular rate and rhythm, normal S1 S2, no S3 or S4, no murmur, click or rub, no peripheral edema and peripheral pulses strong  ABDOMEN: soft, nontender, no hepatosplenomegaly, no masses and bowel sounds normal  MS: no gross musculoskeletal defects noted, no edema  NEURO: Normal strength and tone, sensory exam grossly normal, mentation intact and DTR's normal and symmetric, slight tremor of hands   PSYCH: mentation appears normal, affect normal/bright      XRAY BACK: slight scoliosis, ? significant DJD at L4-5, seems out of proportion to rest of spine.     Assessment & Plan     1. Bilateral low back pain with right-sided sciatica, unspecified chronicity  Will check MRI lumbar spine  - XR Lumbar Spine 2/3 Views; Future  - MR Lumbar Spine w/o Contrast; Future    2. Need for prophylactic vaccination and inoculation against influenza     - Vaccine Administration, Initial [00893]  - INFLUENZA VACCINE IM > 6 MONTHS VALENT IIV4 [30033]  - Vaccine Administration, Initial [50120]    3. Anxiety  under good control Continue current medications.   - sertraline (ZOLOFT) 100 MG tablet; Take 1 tablet (100 mg) by mouth daily 1 1/2 tab daily-37 mg  Dispense: 90 tablet; Refill: 3    4. Drug-induced tremor  Will follow clinically for now.         No follow-ups on file.    Ct Arauz MD  [facility]

## 2019-11-05 ENCOUNTER — TELEPHONE (OUTPATIENT)
Dept: INTERNAL MEDICINE | Facility: CLINIC | Age: 54
End: 2019-11-05

## 2019-11-05 DIAGNOSIS — F41.9 ANXIETY: ICD-10-CM

## 2019-11-05 RX ORDER — SERTRALINE HYDROCHLORIDE 100 MG/1
100 TABLET, FILM COATED ORAL DAILY
Qty: 90 TABLET | Refills: 3 | Status: SHIPPED | OUTPATIENT
Start: 2019-11-05 | End: 2020-11-23

## 2019-11-05 ASSESSMENT — ANXIETY QUESTIONNAIRES: GAD7 TOTAL SCORE: 3

## 2019-11-05 NOTE — TELEPHONE ENCOUNTER
Received fax from pharmacy asking to clarify which set of directions they should use for the sertraline.

## 2019-11-18 DIAGNOSIS — Z12.31 VISIT FOR SCREENING MAMMOGRAM: ICD-10-CM

## 2019-11-18 PROCEDURE — 77067 SCR MAMMO BI INCL CAD: CPT | Mod: TC

## 2019-11-18 PROCEDURE — 77063 BREAST TOMOSYNTHESIS BI: CPT | Mod: TC

## 2019-11-27 ENCOUNTER — HOSPITAL ENCOUNTER (OUTPATIENT)
Dept: MRI IMAGING | Facility: CLINIC | Age: 54
Discharge: HOME | End: 2019-11-27
Attending: INTERNAL MEDICINE | Admitting: INTERNAL MEDICINE
Payer: COMMERCIAL

## 2019-11-27 DIAGNOSIS — M54.41 BILATERAL LOW BACK PAIN WITH RIGHT-SIDED SCIATICA, UNSPECIFIED CHRONICITY: ICD-10-CM

## 2019-11-27 PROCEDURE — 72148 MRI LUMBAR SPINE W/O DYE: CPT

## 2019-12-11 ENCOUNTER — HOSPITAL ENCOUNTER (OUTPATIENT)
Dept: MAMMOGRAPHY | Facility: CLINIC | Age: 54
Discharge: HOME | End: 2019-12-11
Attending: INTERNAL MEDICINE | Admitting: INTERNAL MEDICINE
Payer: COMMERCIAL

## 2019-12-11 DIAGNOSIS — R92.8 ABNORMAL MAMMOGRAM: ICD-10-CM

## 2019-12-11 PROCEDURE — 77065 DX MAMMO INCL CAD UNI: CPT | Mod: LT

## 2019-12-23 ENCOUNTER — HOSPITAL ENCOUNTER (OUTPATIENT)
Dept: MAMMOGRAPHY | Facility: CLINIC | Age: 54
Discharge: HOME | End: 2019-12-23
Attending: INTERNAL MEDICINE | Admitting: INTERNAL MEDICINE
Payer: COMMERCIAL

## 2019-12-23 ENCOUNTER — HOSPITAL ENCOUNTER (OUTPATIENT)
Dept: MAMMOGRAPHY | Facility: CLINIC | Age: 54
End: 2019-12-23
Attending: INTERNAL MEDICINE
Payer: COMMERCIAL

## 2019-12-23 DIAGNOSIS — R92.8 ABNORMAL MAMMOGRAM: ICD-10-CM

## 2019-12-23 PROCEDURE — 25000125 ZZHC RX 250: Performed by: RADIOLOGY

## 2019-12-23 PROCEDURE — 19081 BX BREAST 1ST LESION STRTCTC: CPT | Mod: LT

## 2019-12-23 PROCEDURE — 88305 TISSUE EXAM BY PATHOLOGIST: CPT | Performed by: INTERNAL MEDICINE

## 2019-12-23 PROCEDURE — 88305 TISSUE EXAM BY PATHOLOGIST: CPT | Mod: 26 | Performed by: INTERNAL MEDICINE

## 2019-12-23 PROCEDURE — 40000986 MA POST PROCEDURE LEFT

## 2019-12-23 RX ORDER — LIDOCAINE HYDROCHLORIDE 10 MG/ML
1 INJECTION, SOLUTION EPIDURAL; INFILTRATION; INTRACAUDAL; PERINEURAL ONCE
Status: COMPLETED | OUTPATIENT
Start: 2019-12-23 | End: 2019-12-23

## 2019-12-23 RX ORDER — LIDOCAINE HYDROCHLORIDE AND EPINEPHRINE 10; 10 MG/ML; UG/ML
1 INJECTION, SOLUTION INFILTRATION; PERINEURAL ONCE
Status: COMPLETED | OUTPATIENT
Start: 2019-12-23 | End: 2019-12-23

## 2019-12-23 RX ADMIN — LIDOCAINE HYDROCHLORIDE 6 ML: 10 INJECTION, SOLUTION EPIDURAL; INFILTRATION; INTRACAUDAL; PERINEURAL at 10:23

## 2019-12-23 RX ADMIN — LIDOCAINE HYDROCHLORIDE AND EPINEPHRINE 12 ML: 10; 10 INJECTION, SOLUTION INFILTRATION; PERINEURAL at 10:23

## 2019-12-23 NOTE — DISCHARGE INSTRUCTIONS
Page 1 of 1  For informational purposes only. Not to replace the advice of your health care provider. Copyright   2010 [facility]. All rights reserved. [employer] [id number] - REV 02/16.  After Your Breast Biopsy   Bleeding or bruising  Slight bruising is normal. If you bleed through the bandage, put direct pressure on the breast for 10 minutes.   If the breast begins to swell, or you have a lot of bleeding after 10 minutes of pressure, call the doctor who ordered your exam. Or, go to the emergency room.   Bandages  Keep your bandage in place until tomorrow morning. Do not get it wet.   If you have small pieces of tape on the skin, leave them in place. They will fall off on their own, or you can remove them after 5 days.   Activity  You may shower the morning after the exam. No heavy activity (lifting, vacuuming) on the day of your exam. You may go back to normal activity the next day, unless you had a lot of bleeding or pain.  Discomfort  You may take Tylenol (acetaminophen) today for pain. Tomorrow, you may take an anti-inflammatory medicine (aspirin, ibuprofen, Motrin, Aleve, Advil), unless your doctor tells you not to.  Wear your bra overnight to support the breast. You may also use an ice pack: Place it over the area for 15-20 minutes several times a day.  Infection  Infection is rare. Symptoms include fever, redness, increasing pain and fluid draining from the biopsy site. If you have any of these symptoms, please call the doctor who ordered your exam.  Results  Results may take up to 5 business days. A nurse or doctor from the [facility] will call with your results. We will also send the results to the doctor who ordered your biopsy.  If you have not heard your results in 5 days, please call the Breast Center.   Other instructions  ______________________________________________________________________________________________________________________________  Call your doctor if:    You have  bleeding that lasts more than 10 minutes.    You have pain that cannot be controlled.   You have signs of infection (fever, redness, drainage or other signs).   You have not received your results within 5 days.    Please call the [facility] Center nurse navigator at [phone] if you have questions or concerns about your biopsy.

## 2019-12-24 ENCOUNTER — TELEPHONE (OUTPATIENT)
Dept: SURGERY | Facility: CLINIC | Age: 54
End: 2019-12-24

## 2019-12-24 LAB — COPATH REPORT: NORMAL

## 2019-12-24 NOTE — TELEPHONE ENCOUNTER
Call placed to patient.  verified.    Patient notified pathology results from left breast biopsy performed on 2019 revealed: FCC, FEA, ALH, no evidence of malignancy.    Per radiologist, Dr. Rob Walker, results are concordant with imaging findings.    Recommendation: Surgical Consultation     Patient is scheduled to meet with Dr. Arndt on 2019 check in at 0900 at [facility] [place].      All patient's questions answered appropriately and thoroughly. Patient will call our office in the interim with additional questions/concerns.     Both parties in agreement of above plan.    Lindsay MATHISN, RN, OCN  Oncology Care Coordinator  [facility]  [facility]  Phone: [phone]

## 2019-12-30 ENCOUNTER — TELEPHONE (OUTPATIENT)
Dept: SURGERY | Facility: CLINIC | Age: 54
End: 2019-12-30

## 2019-12-30 ENCOUNTER — PREP FOR PROCEDURE (OUTPATIENT)
Dept: SURGERY | Facility: CLINIC | Age: 54
End: 2019-12-30

## 2019-12-30 ENCOUNTER — OFFICE VISIT (OUTPATIENT)
Dept: SURGERY | Facility: CLINIC | Age: 54
End: 2019-12-30
Payer: COMMERCIAL

## 2019-12-30 VITALS
BODY MASS INDEX: 22.18 KG/M2 | HEIGHT: 66 IN | SYSTOLIC BLOOD PRESSURE: 116 MMHG | DIASTOLIC BLOOD PRESSURE: 78 MMHG | OXYGEN SATURATION: 97 % | HEART RATE: 84 BPM | RESPIRATION RATE: 16 BRPM | WEIGHT: 138 LBS

## 2019-12-30 DIAGNOSIS — N60.92 ATYPICAL LOBULAR HYPERPLASIA (ALH) OF LEFT BREAST: Primary | ICD-10-CM

## 2019-12-30 PROCEDURE — 99204 OFFICE O/P NEW MOD 45 MIN: CPT | Performed by: SURGERY

## 2019-12-30 ASSESSMENT — MIFFLIN-ST. JEOR: SCORE: 1247.71

## 2019-12-30 NOTE — LETTER
2019    RE: Brii Taylor, : 1965      HPI:  Calcifications noted on screening mammogram 19 upper outer left, diagnostic mammogram on 19 shows a 2 cm area of calcifications at 0100 and 5 cm FN     She is a      Skin rashes, dimpling or nipple changes:  none  Nipple discharge:   none  Does perform self breast exams.  Previous breast biopsies: Yes -benign ?  Cyst  Previous cyst aspiration: Yes -above  Previous other breast surgery: No     Family history of breast cancer: Yes - sister at 39 yo  Family history of colon cancer: No  Family history of pancreatic cancer: No  Family history of prostate cancer: No     Mammography reveals: see above     Percutaneous core needle biopsy reveals: ALH and flat epithelial atypia      Past Medical History:  Has a past medical history of Anxiety, Migraine, unspecified, without mention of intractable migraine without mention of status migrainosus, Sleep disturbance, unspecified, and Viral warts, unspecified.    PE:  Vitals: There were no vitals taken for this visit.  General appearance: well-nourished, sitting comfortably, no apparent distress  Neck: Supple without thyromegaly, lymphadenopathy, masses  Lungs: Respirations unlabored  Abdomen: soft, nondistended  Extremities: Without edema  Neurologic: nonfocal, grossly intact times four extremities, alert and oriented times three  Psychiatric: Mood and affect are appropriate  Skin: Without lesions or rashes  Breast:                Masses- none -some induration upper outer left              Ecchymosis- left, upper outer quadrant              Incisional scar- none     Axillae:   Palpable adenopathy: none     Assessment: Left breast calcifications at  1 o'clock and 5 cm from the nipple.      PLAN:  Discussed options for further evaluation of ALH and FEA.  We discussed seed localized excisional biopsy including incision, scar, volume loss, indentation, infection, bleeding, seroma.  We  also discussed potential need for additional surgery for margins and lymph node evaluation if malignancy is identified.  We also discussed possible genetic testing if malignancy is identified.  Unclear which genetic tests her sister had with development of cancer at age 38.     Cristi Arndt MD

## 2019-12-30 NOTE — PROGRESS NOTES
Breast Patients      BREAST PATIENTS (FEMALE)    At what age did your periods begin? 14 years old    What was the date of your last menstrual period? 09/16/2019    Have you begun menopause? unsure    Are you using hormone replacement therapy?  No    Number of full-term pregnancies: 2    Did you nurse your children? No and N/A    Are you pregnant now? No    Do you have breast implants? No         BREAST PATIENTS (ALL)    Have you had a previous breast biopsy? Yes  Side: left  Date:  Maybe 2005    Have you had previous Breast Cancer? No

## 2019-12-30 NOTE — PROGRESS NOTES
HPI:  Calcifications noted on screening mammogram 11/18/19 upper outer left, diagnostic mammogram on 12/11/19 shows a 2 cm area of calcifications at 0100 and 5 cm FN    She is a     Skin rashes, dimpling or nipple changes:  none  Nipple discharge:   none  Does perform self breast exams.  Previous breast biopsies: Yes -benign ?  Cyst  Previous cyst aspiration: Yes -above  Previous other breast surgery: No    Family History   Problem Relation Age of Onset     Hypertension Mother      Cancer Mother 70        nonHodgkins lymphoma     Psychotic Disorder Mother         anxiety     Alzheimer Disease Father      Breast Cancer Sister         breast ca age 38     Gastrointestinal Disease Brother         crohns or colitis     No Known Problems Daughter      Colon Cancer No family hx of      Family history of breast cancer: Yes - sister at 37 yo  Family history of colon cancer: No  Family history of pancreatic cancer: No  Family history of prostate cancer: No    Mammography reveals: see above    Percutaneous core needle biopsy reveals: ALH and flat epithelial atypia     Past Medical History:   has a past medical history of Anxiety, Migraine, unspecified, without mention of intractable migraine without mention of status migrainosus, Sleep disturbance, unspecified, and Viral warts, unspecified.    Past Surgical History:  Past Surgical History:   Procedure Laterality Date     COLONOSCOPY Left 12/18/2017    Procedure: COLONOSCOPY;  Colonoscopy; difficulty in advancing scope (tight corner) so used biopsy forcep to follow/ advance scope;  Surgeon: Babar Gramajo MD;  Location:  GI     [facility]          Social History:  Social History     Socioeconomic History     Marital status:      Spouse name: Not on file     Number of children: Not on file     Years of education: Not on file     Highest education level: Not on file   Occupational History     Not on file   Social Needs     Financial resource  strain: Not on file     Food insecurity:     Worry: Not on file     Inability: Not on file     Transportation needs:     Medical: Not on file     Non-medical: Not on file   Tobacco Use     Smoking status: Never Smoker     Smokeless tobacco: Never Used   Substance and Sexual Activity     Alcohol use: No     Drug use: No     Sexual activity: Yes     Partners: Male     Birth control/protection: Pill   Lifestyle     Physical activity:     Days per week: Not on file     Minutes per session: Not on file     Stress: Not on file   Relationships     Social connections:     Talks on phone: Not on file     Gets together: Not on file     Attends Christian service: Not on file     Active member of club or organization: Not on file     Attends meetings of clubs or organizations: Not on file     Relationship status: Not on file     Intimate partner violence:     Fear of current or ex partner: Not on file     Emotionally abused: Not on file     Physically abused: Not on file     Forced sexual activity: Not on file   Other Topics Concern     Parent/sibling w/ CABG, MI or angioplasty before 65F 55M? Not Asked   Social History Narrative     Not on file       PE:  Vitals: There were no vitals taken for this visit.  General appearance: well-nourished, sitting comfortably, no apparent distress  Neck: Supple without thyromegaly, lymphadenopathy, masses  Lungs: Respirations unlabored  Abdomen: soft, nondistended  Extremities: Without edema  Neurologic: nonfocal, grossly intact times four extremities, alert and oriented times three  Psychiatric: Mood and affect are appropriate  Skin: Without lesions or rashes  Breast:     Masses- none -some induration upper outer left   Ecchymosis- left, upper outer quadrant   Incisional scar- none    Axillae:   Palpable adenopathy: none    Assessment: Left breast calcifications at  1 o'clock and 5 cm from the nipple.     PLAN:  Discussed options for further evaluation of ALH and FEA.  We discussed seed  localized excisional biopsy including incision, scar, volume loss, indentation, infection, bleeding, seroma.  We also discussed potential need for additional surgery for margins and lymph node evaluation if malignancy is identified.  We also discussed possible genetic testing if malignancy is identified.  Unclear which genetic tests her sister had with development of cancer at age 38.    Cristi Arndt MD    Please route or send letter to:  Primary Care Provider (PCP) and Include Progress Note

## 2019-12-30 NOTE — TELEPHONE ENCOUNTER
Type of surgery: LEFT BREAST SEED LOCALLIZED EXCISIONAL BIOPSY  Location of surgery: [facility]  Date and time of surgery: 1/24/2019 @ 10:00 AM   Surgeon: JOSE ALBERTO CHARLES MD    Pre-Op Appt Date: PATIENT TO SCHEDULE    Post-Op Appt Date: PATIENT TO SCHEDULE     Packet sent out: Yes  PACKET AND SOAP GIVEN TO PATIENT    Pre-cert/Authorization completed:  Not Applicable  Date: 12/30/2019     LEFT BREAST SEED LOCALLIZED EXCISIONAL BIOPSY    GENERAL PT INST TO HAVE H&P WITH DR LE 90 MIN REQ PA ASSIST DJM NMS   SEED AT 8:00 NMS

## 2020-01-20 ENCOUNTER — OFFICE VISIT (OUTPATIENT)
Dept: INTERNAL MEDICINE | Facility: CLINIC | Age: 55
End: 2020-01-20
Payer: COMMERCIAL

## 2020-01-20 VITALS
WEIGHT: 141 LBS | OXYGEN SATURATION: 98 % | HEART RATE: 76 BPM | BODY MASS INDEX: 23.49 KG/M2 | SYSTOLIC BLOOD PRESSURE: 116 MMHG | HEIGHT: 65 IN | TEMPERATURE: 97.3 F | DIASTOLIC BLOOD PRESSURE: 76 MMHG | RESPIRATION RATE: 16 BRPM

## 2020-01-20 DIAGNOSIS — N63.0 LUMP OR MASS IN BREAST: ICD-10-CM

## 2020-01-20 DIAGNOSIS — G47.33 OBSTRUCTIVE SLEEP APNEA SYNDROME: ICD-10-CM

## 2020-01-20 DIAGNOSIS — Z01.818 PREOP GENERAL PHYSICAL EXAM: Primary | ICD-10-CM

## 2020-01-20 LAB
ERYTHROCYTE [DISTWIDTH] IN BLOOD BY AUTOMATED COUNT: 12.7 % (ref 10–15)
HCT VFR BLD AUTO: 43 % (ref 35–47)
HGB BLD-MCNC: 14 G/DL (ref 11.7–15.7)
MCH RBC QN AUTO: 28.9 PG (ref 26.5–33)
MCHC RBC AUTO-ENTMCNC: 32.6 G/DL (ref 31.5–36.5)
MCV RBC AUTO: 89 FL (ref 78–100)
PLATELET # BLD AUTO: 294 10E9/L (ref 150–450)
RBC # BLD AUTO: 4.85 10E12/L (ref 3.8–5.2)
WBC # BLD AUTO: 7.9 10E9/L (ref 4–11)

## 2020-01-20 PROCEDURE — 80053 COMPREHEN METABOLIC PANEL: CPT | Performed by: INTERNAL MEDICINE

## 2020-01-20 PROCEDURE — 85027 COMPLETE CBC AUTOMATED: CPT | Performed by: INTERNAL MEDICINE

## 2020-01-20 PROCEDURE — 99214 OFFICE O/P EST MOD 30 MIN: CPT | Performed by: INTERNAL MEDICINE

## 2020-01-20 PROCEDURE — 36415 COLL VENOUS BLD VENIPUNCTURE: CPT | Performed by: INTERNAL MEDICINE

## 2020-01-20 PROCEDURE — 93000 ELECTROCARDIOGRAM COMPLETE: CPT | Performed by: INTERNAL MEDICINE

## 2020-01-20 ASSESSMENT — MIFFLIN-ST. JEOR: SCORE: 1245.45

## 2020-01-20 NOTE — PROGRESS NOTES
[facility]  [address]  [facility] [address]  [phone]  Dept: [phone]    PRE-OP EVALUATION:  Today's date: 2020    Brii Taylor (: 1965) presents for pre-operative evaluation assessment.    Primary Physician: Ct Arauz  Type of Anesthesia Anticipated: General    Patient has a Health Care Directive or Living Will:  NO    Preop Questions 2020   Who is doing your surgery? Dr Arndt   What are you having done? Left Breast Biopsy   Date of Surgery/Procedure: 2020   Facility or Hospital where procedure/surgery will be performed: [facility]   1.  Do you have a history of Heart attack, stroke, stent, coronary bypass surgery, or other heart surgery? No   2.  Do you ever have any pain or discomfort in your chest? No   3.  Do you have a history of  Heart Failure? No   4.   Are you troubled by shortness of breath when:  walking on a level surface, or up a slight hill, or at night? No   5.  Do you currently have a cold, bronchitis or other respiratory infection? No   6.  Do you have a cough, shortness of breath, or wheezing? No   7.  Do you sometimes get pains in the calves of your legs when you walk? No   8. Do you or anyone in your family have previous history of blood clots? No   9.  Do you or does anyone in your family have a serious bleeding problem such as prolonged bleeding following surgeries or cuts? No   10. Have you ever had problems with anemia or been told to take iron pills? No   11. Have you had any abnormal blood loss such as black, tarry or bloody stools, or abnormal vaginal bleeding? No   12. Have you ever had a blood transfusion? No   13. Have you or any of your relatives ever had problems with anesthesia? No   14. Do you have sleep apnea, excessive snoring or daytime drowsiness? YES -     15. Do you have any prosthetic heart valves? No   16. Do you have prosthetic joints? No   17. Is there any chance that you may be  pregnant? No         HPI:     HPI related to upcoming procedure: left breast lump found in December biopsy results unclear going in for further resection.       See problem list for active medical problems.  Problems all longstanding and stable, except as noted/documented.  See ROS for pertinent symptoms related to these conditions.      MEDICAL HISTORY:     Patient Active Problem List    Diagnosis Date Noted     Atypical lobular hyperplasia (ALH) of left breast 12/30/2019     Priority: Medium     Added automatically from request for surgery [id number]       Mitral valve prolapse 03/17/2017     Priority: Medium     Pulmonary nodules 03/13/2017     Priority: Medium     Onychomycosis 01/08/2013     Priority: Medium     Finger pain, right 01/08/2013     Priority: Medium     Plantar warts 01/08/2013     Priority: Medium     CARDIOVASCULAR SCREENING; LDL GOAL LESS THAN 160 10/31/2010     Priority: Medium     Contraception 05/29/2009     Priority: Medium     Anxiety 05/29/2009     Priority: Medium      Past Medical History:   Diagnosis Date     Anxiety     sees psychiatrist     Migraine, unspecified, without mention of intractable migraine without mention of status migrainosus      Sleep disturbance, unspecified     hx sleep apnea     Viral warts, unspecified      Past Surgical History:   Procedure Laterality Date     COLONOSCOPY Left 12/18/2017    Procedure: COLONOSCOPY;  Colonoscopy; difficulty in advancing scope (tight corner) so used biopsy forcep to follow/ advance scope;  Surgeon: Babar Gramajo MD;  Location:  GI     [facility]       Current Outpatient Medications   Medication Sig Dispense Refill     acetaminophen-caffeine (EXCEDRIN TENSION HEADACHE) 500-65 MG TABS Take 2 tablets by mouth every 6 hours as needed for mild pain       sertraline (ZOLOFT) 100 MG tablet Take 1 tablet (100 mg) by mouth daily 90 tablet 3     OTC products: None, except as noted above    Allergies   Allergen Reactions     Penicillins   "    PN: LW Reaction: rash      Latex Allergy: NO    Social History     Tobacco Use     Smoking status: Never Smoker     Smokeless tobacco: Never Used   Substance Use Topics     Alcohol use: No     History   Drug Use No       REVIEW OF SYSTEMS:   CONSTITUTIONAL: NEGATIVE for fever, chills, change in weight  INTEGUMENTARY/SKIN: NEGATIVE for worrisome rashes, moles or lesions  EYES: NEGATIVE for vision changes or irritation  ENT/MOUTH: NEGATIVE for ear, mouth and throat problems  RESP: NEGATIVE for significant cough or SOB  BREAST: NEGATIVE for masses, tenderness or discharge  CV: NEGATIVE for chest pain, palpitations or peripheral edema  GI: NEGATIVE for nausea, abdominal pain, heartburn, or change in bowel habits  : NEGATIVE for frequency, dysuria, or hematuria  MUSCULOSKELETAL: NEGATIVE for significant arthralgias or myalgia  NEURO: NEGATIVE for weakness, dizziness or paresthesias  ENDOCRINE: NEGATIVE for temperature intolerance, skin/hair changes  HEME: NEGATIVE for bleeding problems  PSYCHIATRIC: NEGATIVE for changes in mood or affect    EXAM:   /76 (BP Location: Right arm, Patient Position: Chair, Cuff Size: Adult Regular)   Pulse 76   Temp 97.3  F (36.3  C) (Oral)   Resp 16   Ht 1.651 m (5' 5\")   Wt 64 kg (141 lb)   LMP 09/20/2019   SpO2 98%   BMI 23.46 kg/m      GENERAL APPEARANCE: healthy, alert and no distress     EYES: EOMI, PERRL     HENT: ear canals and TM's normal and nose and mouth without ulcers or lesions     NECK: no adenopathy, no asymmetry, masses, or scars and thyroid normal to palpation     RESP: lungs clear to auscultation - no rales, rhonchi or wheezes     CV: regular rates and rhythm, normal S1 S2, no S3 or S4 and no murmur, click or rub     ABDOMEN:  soft, nontender, no HSM or masses and bowel sounds normal     MS: extremities normal- no gross deformities noted, no evidence of inflammation in joints, FROM in all extremities.     SKIN: no suspicious lesions or rashes     NEURO: " Normal strength and tone, sensory exam grossly normal, mentation intact and speech normal     PSYCH: mentation appears normal. and affect normal/bright     LYMPHATICS: No cervical adenopathy    DIAGNOSTICS:     EKG: appears normal, NSR, normal axis, normal intervals, no acute ST/T changes c/w ischemia, no LVH by voltage criteria, unchanged from previous tracings  Labs Drawn and in Process:   Unresulted Labs Ordered in the Past 30 Days of this Admission     Date and Time Order Name Status Description    1/20/2020 1138 CBC WITH PLATELETS In process     1/20/2020 1138 COMPREHENSIVE METABOLIC PANEL In process           Recent Labs   Lab Test 03/08/19  1559 10/26/15  0945   HGB 13.8 14.2    327    139   POTASSIUM 3.7 3.9   CR 0.65 0.66        IMPRESSION:   Reason for surgery/procedure: further breast biopsy   Diagnosis/reason for consult: obstructive sleep apnea     The proposed surgical procedure is considered INTERMEDIATE risk.    REVISED CARDIAC RISK INDEX  The patient has the following serious cardiovascular risks for perioperative complications such as (MI, PE, VFib and 3  AV Block):  No serious cardiac risks  INTERPRETATION: 0 risks: Class I (very low risk - 0.4% complication rate)    The patient has the following additional risks for perioperative complications:  obstructive sleep apnea       ICD-10-CM    1. Preop general physical exam Z01.818 EKG 12-lead complete w/read - Clinics       RECOMMENDATIONS:         --Patient is to take all scheduled medications on the day of surgery EXCEPT for modifications listed below.    Anticoagulant or Antiplatelet Medication Use  ASPIRIN: stop Excedrin 3 days before procedure        APPROVAL GIVEN to proceed with proposed procedure, without further diagnostic evaluation       Signed Electronically by: Ct Arauz MD    Copy of this evaluation report is provided to requesting physician.    [facility] Preop Guidelines    Revised Cardiac Risk Index

## 2020-01-21 LAB
ALBUMIN SERPL-MCNC: 3.9 G/DL (ref 3.4–5)
ALP SERPL-CCNC: 59 U/L (ref 40–150)
ALT SERPL W P-5'-P-CCNC: 17 U/L (ref 0–50)
ANION GAP SERPL CALCULATED.3IONS-SCNC: 6 MMOL/L (ref 3–14)
AST SERPL W P-5'-P-CCNC: 18 U/L (ref 0–45)
BILIRUB SERPL-MCNC: 0.6 MG/DL (ref 0.2–1.3)
BUN SERPL-MCNC: 14 MG/DL (ref 7–30)
CALCIUM SERPL-MCNC: 9.1 MG/DL (ref 8.5–10.1)
CHLORIDE SERPL-SCNC: 106 MMOL/L (ref 94–109)
CO2 SERPL-SCNC: 27 MMOL/L (ref 20–32)
CREAT SERPL-MCNC: 0.66 MG/DL (ref 0.52–1.04)
GFR SERPL CREATININE-BSD FRML MDRD: >90 ML/MIN/{1.73_M2}
GLUCOSE SERPL-MCNC: 81 MG/DL (ref 70–99)
POTASSIUM SERPL-SCNC: 4.1 MMOL/L (ref 3.4–5.3)
PROT SERPL-MCNC: 7.2 G/DL (ref 6.8–8.8)
SODIUM SERPL-SCNC: 139 MMOL/L (ref 133–144)

## 2020-01-23 ENCOUNTER — ANESTHESIA EVENT (OUTPATIENT)
Dept: SURGERY | Facility: CLINIC | Age: 55
End: 2020-01-23
Payer: COMMERCIAL

## 2020-01-24 ENCOUNTER — ANESTHESIA (OUTPATIENT)
Dept: SURGERY | Facility: CLINIC | Age: 55
End: 2020-01-24
Payer: COMMERCIAL

## 2020-01-24 ENCOUNTER — HOSPITAL ENCOUNTER (OUTPATIENT)
Facility: CLINIC | Age: 55
Discharge: HOME | End: 2020-01-24
Attending: SURGERY | Admitting: SURGERY
Payer: COMMERCIAL

## 2020-01-24 ENCOUNTER — HOSPITAL ENCOUNTER (OUTPATIENT)
Dept: ULTRASOUND IMAGING | Facility: CLINIC | Age: 55
End: 2020-01-24
Attending: SURGERY | Admitting: SURGERY
Payer: COMMERCIAL

## 2020-01-24 ENCOUNTER — APPOINTMENT (OUTPATIENT)
Dept: SURGERY | Facility: PHYSICIAN GROUP | Age: 55
End: 2020-01-24
Payer: COMMERCIAL

## 2020-01-24 ENCOUNTER — HOSPITAL ENCOUNTER (OUTPATIENT)
Dept: MAMMOGRAPHY | Facility: CLINIC | Age: 55
End: 2020-01-24
Attending: SURGERY | Admitting: SURGERY
Payer: COMMERCIAL

## 2020-01-24 ENCOUNTER — APPOINTMENT (OUTPATIENT)
Dept: MAMMOGRAPHY | Facility: CLINIC | Age: 55
End: 2020-01-24
Attending: SURGERY
Payer: COMMERCIAL

## 2020-01-24 VITALS
OXYGEN SATURATION: 99 % | HEART RATE: 87 BPM | BODY MASS INDEX: 23.32 KG/M2 | RESPIRATION RATE: 16 BRPM | WEIGHT: 140 LBS | DIASTOLIC BLOOD PRESSURE: 94 MMHG | SYSTOLIC BLOOD PRESSURE: 127 MMHG | TEMPERATURE: 97.8 F | HEIGHT: 65 IN

## 2020-01-24 DIAGNOSIS — N60.92 ATYPICAL LOBULAR HYPERPLASIA (ALH) OF LEFT BREAST: ICD-10-CM

## 2020-01-24 LAB — HCG UR QL: NEGATIVE

## 2020-01-24 PROCEDURE — 36000052 ZZH SURGERY LEVEL 2 EA 15 ADDTL MIN: Performed by: SURGERY

## 2020-01-24 PROCEDURE — 25000128 H RX IP 250 OP 636: Performed by: NURSE ANESTHETIST, CERTIFIED REGISTERED

## 2020-01-24 PROCEDURE — 71000027 ZZH RECOVERY PHASE 2 EACH 15 MINS: Performed by: SURGERY

## 2020-01-24 PROCEDURE — 40000986 MA POST PROCEDURE LEFT

## 2020-01-24 PROCEDURE — 27210794 ZZH OR GENERAL SUPPLY STERILE: Performed by: SURGERY

## 2020-01-24 PROCEDURE — 25800030 ZZH RX IP 258 OP 636: Performed by: ANESTHESIOLOGY

## 2020-01-24 PROCEDURE — 25000132 ZZH RX MED GY IP 250 OP 250 PS 637: Performed by: ANESTHESIOLOGY

## 2020-01-24 PROCEDURE — 19125 EXCISION BREAST LESION: CPT | Mod: LT | Performed by: SURGERY

## 2020-01-24 PROCEDURE — 25000128 H RX IP 250 OP 636: Performed by: SURGERY

## 2020-01-24 PROCEDURE — A4641 RADIOPHARM DX AGENT NOC: HCPCS

## 2020-01-24 PROCEDURE — 25000132 ZZH RX MED GY IP 250 OP 250 PS 637: Performed by: SURGERY

## 2020-01-24 PROCEDURE — 25000125 ZZHC RX 250: Performed by: SURGERY

## 2020-01-24 PROCEDURE — 40000268 MA BREAST SPECIMEN LEFT OR

## 2020-01-24 PROCEDURE — 81025 URINE PREGNANCY TEST: CPT | Performed by: ANESTHESIOLOGY

## 2020-01-24 PROCEDURE — 88307 TISSUE EXAM BY PATHOLOGIST: CPT | Mod: 26 | Performed by: SURGERY

## 2020-01-24 PROCEDURE — 40000306 ZZH STATISTIC PRE PROC ASSESS II: Performed by: SURGERY

## 2020-01-24 PROCEDURE — 37000008 ZZH ANESTHESIA TECHNICAL FEE, 1ST 30 MIN: Performed by: SURGERY

## 2020-01-24 PROCEDURE — 71000012 ZZH RECOVERY PHASE 1 LEVEL 1 FIRST HR: Performed by: SURGERY

## 2020-01-24 PROCEDURE — 25000125 ZZHC RX 250: Performed by: NURSE ANESTHETIST, CERTIFIED REGISTERED

## 2020-01-24 PROCEDURE — 36000054 ZZH SURGERY LEVEL 2 W FLUORO 1ST 30 MIN: Performed by: SURGERY

## 2020-01-24 PROCEDURE — 37000009 ZZH ANESTHESIA TECHNICAL FEE, EACH ADDTL 15 MIN: Performed by: SURGERY

## 2020-01-24 PROCEDURE — 88307 TISSUE EXAM BY PATHOLOGIST: CPT | Performed by: SURGERY

## 2020-01-24 RX ORDER — HYDROMORPHONE HYDROCHLORIDE 1 MG/ML
.3-.5 INJECTION, SOLUTION INTRAMUSCULAR; INTRAVENOUS; SUBCUTANEOUS EVERY 10 MIN PRN
Status: DISCONTINUED | OUTPATIENT
Start: 2020-01-24 | End: 2020-01-24 | Stop reason: HOSPADM

## 2020-01-24 RX ORDER — NALOXONE HYDROCHLORIDE 0.4 MG/ML
.1-.4 INJECTION, SOLUTION INTRAMUSCULAR; INTRAVENOUS; SUBCUTANEOUS
Status: DISCONTINUED | OUTPATIENT
Start: 2020-01-24 | End: 2020-01-24 | Stop reason: HOSPADM

## 2020-01-24 RX ORDER — MEPERIDINE HYDROCHLORIDE 50 MG/ML
12.5 INJECTION INTRAMUSCULAR; INTRAVENOUS; SUBCUTANEOUS
Status: DISCONTINUED | OUTPATIENT
Start: 2020-01-24 | End: 2020-01-24 | Stop reason: HOSPADM

## 2020-01-24 RX ORDER — CELECOXIB 200 MG/1
200 CAPSULE ORAL ONCE
Status: COMPLETED | OUTPATIENT
Start: 2020-01-24 | End: 2020-01-24

## 2020-01-24 RX ORDER — SODIUM CHLORIDE, SODIUM LACTATE, POTASSIUM CHLORIDE, CALCIUM CHLORIDE 600; 310; 30; 20 MG/100ML; MG/100ML; MG/100ML; MG/100ML
INJECTION, SOLUTION INTRAVENOUS CONTINUOUS
Status: DISCONTINUED | OUTPATIENT
Start: 2020-01-24 | End: 2020-01-24 | Stop reason: HOSPADM

## 2020-01-24 RX ORDER — OXYCODONE HYDROCHLORIDE 5 MG/1
5-10 TABLET ORAL EVERY 4 HOURS PRN
Qty: 10 TABLET | Refills: 0 | Status: SHIPPED | OUTPATIENT
Start: 2020-01-24 | End: 2020-08-19

## 2020-01-24 RX ORDER — ACETAMINOPHEN 325 MG/1
975 TABLET ORAL ONCE
Status: COMPLETED | OUTPATIENT
Start: 2020-01-24 | End: 2020-01-24

## 2020-01-24 RX ORDER — CEFAZOLIN SODIUM 2 G/100ML
2 INJECTION, SOLUTION INTRAVENOUS
Status: COMPLETED | OUTPATIENT
Start: 2020-01-24 | End: 2020-01-24

## 2020-01-24 RX ORDER — OXYCODONE HYDROCHLORIDE 5 MG/1
5 TABLET ORAL
Status: COMPLETED | OUTPATIENT
Start: 2020-01-24 | End: 2020-01-24

## 2020-01-24 RX ORDER — CEFAZOLIN SODIUM 1 G/3ML
1 INJECTION, POWDER, FOR SOLUTION INTRAMUSCULAR; INTRAVENOUS SEE ADMIN INSTRUCTIONS
Status: DISCONTINUED | OUTPATIENT
Start: 2020-01-24 | End: 2020-01-24 | Stop reason: HOSPADM

## 2020-01-24 RX ORDER — FENTANYL CITRATE 50 UG/ML
25-50 INJECTION, SOLUTION INTRAMUSCULAR; INTRAVENOUS
Status: DISCONTINUED | OUTPATIENT
Start: 2020-01-24 | End: 2020-01-24 | Stop reason: HOSPADM

## 2020-01-24 RX ORDER — PROPOFOL 10 MG/ML
INJECTION, EMULSION INTRAVENOUS PRN
Status: DISCONTINUED | OUTPATIENT
Start: 2020-01-24 | End: 2020-01-24

## 2020-01-24 RX ORDER — ONDANSETRON 2 MG/ML
INJECTION INTRAMUSCULAR; INTRAVENOUS PRN
Status: DISCONTINUED | OUTPATIENT
Start: 2020-01-24 | End: 2020-01-24

## 2020-01-24 RX ORDER — CLONAZEPAM 0.5 MG/1
TABLET ORAL
Refills: 1 | COMMUNITY
Start: 2019-09-05

## 2020-01-24 RX ORDER — DEXAMETHASONE SODIUM PHOSPHATE 4 MG/ML
INJECTION, SOLUTION INTRA-ARTICULAR; INTRALESIONAL; INTRAMUSCULAR; INTRAVENOUS; SOFT TISSUE PRN
Status: DISCONTINUED | OUTPATIENT
Start: 2020-01-24 | End: 2020-01-24

## 2020-01-24 RX ORDER — GLYCOPYRROLATE 0.2 MG/ML
INJECTION, SOLUTION INTRAMUSCULAR; INTRAVENOUS PRN
Status: DISCONTINUED | OUTPATIENT
Start: 2020-01-24 | End: 2020-01-24

## 2020-01-24 RX ORDER — ONDANSETRON 2 MG/ML
4 INJECTION INTRAMUSCULAR; INTRAVENOUS EVERY 30 MIN PRN
Status: DISCONTINUED | OUTPATIENT
Start: 2020-01-24 | End: 2020-01-24 | Stop reason: HOSPADM

## 2020-01-24 RX ORDER — ONDANSETRON 4 MG/1
4 TABLET, ORALLY DISINTEGRATING ORAL EVERY 30 MIN PRN
Status: DISCONTINUED | OUTPATIENT
Start: 2020-01-24 | End: 2020-01-24 | Stop reason: HOSPADM

## 2020-01-24 RX ORDER — FENTANYL CITRATE 50 UG/ML
25-50 INJECTION, SOLUTION INTRAMUSCULAR; INTRAVENOUS EVERY 5 MIN PRN
Status: DISCONTINUED | OUTPATIENT
Start: 2020-01-24 | End: 2020-01-24 | Stop reason: HOSPADM

## 2020-01-24 RX ORDER — LIDOCAINE 40 MG/G
CREAM TOPICAL
Status: DISCONTINUED | OUTPATIENT
Start: 2020-01-24 | End: 2020-01-24 | Stop reason: HOSPADM

## 2020-01-24 RX ORDER — BUPIVACAINE HYDROCHLORIDE AND EPINEPHRINE 2.5; 5 MG/ML; UG/ML
INJECTION, SOLUTION EPIDURAL; INFILTRATION; INTRACAUDAL; PERINEURAL PRN
Status: DISCONTINUED | OUTPATIENT
Start: 2020-01-24 | End: 2020-01-24 | Stop reason: HOSPADM

## 2020-01-24 RX ORDER — FENTANYL CITRATE 50 UG/ML
INJECTION, SOLUTION INTRAMUSCULAR; INTRAVENOUS PRN
Status: DISCONTINUED | OUTPATIENT
Start: 2020-01-24 | End: 2020-01-24

## 2020-01-24 RX ORDER — GABAPENTIN 300 MG/1
300 CAPSULE ORAL ONCE
Status: COMPLETED | OUTPATIENT
Start: 2020-01-24 | End: 2020-01-24

## 2020-01-24 RX ORDER — ALBUTEROL SULFATE 0.83 MG/ML
2.5 SOLUTION RESPIRATORY (INHALATION) EVERY 4 HOURS PRN
Status: DISCONTINUED | OUTPATIENT
Start: 2020-01-24 | End: 2020-01-24 | Stop reason: HOSPADM

## 2020-01-24 RX ADMIN — SODIUM CHLORIDE, POTASSIUM CHLORIDE, SODIUM LACTATE AND CALCIUM CHLORIDE: 600; 310; 30; 20 INJECTION, SOLUTION INTRAVENOUS at 10:04

## 2020-01-24 RX ADMIN — GLYCOPYRROLATE 0.2 MG: 0.2 INJECTION, SOLUTION INTRAMUSCULAR; INTRAVENOUS at 10:18

## 2020-01-24 RX ADMIN — OXYCODONE HYDROCHLORIDE 5 MG: 5 TABLET ORAL at 11:42

## 2020-01-24 RX ADMIN — CELECOXIB 200 MG: 200 CAPSULE ORAL at 09:57

## 2020-01-24 RX ADMIN — GABAPENTIN 300 MG: 300 CAPSULE ORAL at 09:58

## 2020-01-24 RX ADMIN — DEXAMETHASONE SODIUM PHOSPHATE 4 MG: 4 INJECTION, SOLUTION INTRA-ARTICULAR; INTRALESIONAL; INTRAMUSCULAR; INTRAVENOUS; SOFT TISSUE at 10:10

## 2020-01-24 RX ADMIN — PROPOFOL 40 MG: 10 INJECTION, EMULSION INTRAVENOUS at 10:14

## 2020-01-24 RX ADMIN — MIDAZOLAM 2 MG: 1 INJECTION INTRAMUSCULAR; INTRAVENOUS at 10:04

## 2020-01-24 RX ADMIN — LIDOCAINE HYDROCHLORIDE 5 ML: 10 INJECTION, SOLUTION EPIDURAL; INFILTRATION; INTRACAUDAL; PERINEURAL at 08:53

## 2020-01-24 RX ADMIN — ONDANSETRON HYDROCHLORIDE 4 MG: 2 INJECTION, SOLUTION INTRAVENOUS at 10:11

## 2020-01-24 RX ADMIN — PROPOFOL 160 MG: 10 INJECTION, EMULSION INTRAVENOUS at 10:11

## 2020-01-24 RX ADMIN — CEFAZOLIN SODIUM 2 G: 2 INJECTION, SOLUTION INTRAVENOUS at 10:04

## 2020-01-24 RX ADMIN — FENTANYL CITRATE 100 MCG: 50 INJECTION, SOLUTION INTRAMUSCULAR; INTRAVENOUS at 10:08

## 2020-01-24 RX ADMIN — ACETAMINOPHEN 975 MG: 325 TABLET, FILM COATED ORAL at 09:57

## 2020-01-24 ASSESSMENT — MIFFLIN-ST. JEOR: SCORE: 1240.92

## 2020-01-24 NOTE — OP NOTE
General Surgery Operative Note      Pre-operative diagnosis: Left breast atypical lobular hyperplasia and flat epithelial atypia   Post-operative diagnosis: Same   Procedure: Left seed-localized breast biopsy, upper outer quadrant    Surgeon: Cristi Arndt MD   Assistant(s): Melissa Turpin PA-C  The Physician Assistant was medically necessary for their expertise in prepping, camera management, suctioning, suturing and retraction.   Anesthesia: General    Estimated blood loss:   5 cc     Specimens: ID Type Source Tests Collected by Time Destination   A : left breast seed localized excisional biopsy, suture short superior, long lateral  Tissue Breast, Left SURGICAL PATHOLOGY EXAM Cristi Arndt MD 1/24/2020 10:38 AM           INDICATIONS:  Left breast microcalcifications, upper outer quadrant.  Percutaneous biopsy reveals ALH and flat epithelial atypia.     DESCRIPTION OF PROCEDURE:  The patient was placed on the table in supine position.  Anesthetic was administered.  The left breast was prepped and draped in standard sterile fashion.  We used the seed placed in the Breast Center as well as the post-seed mammograms to localize the area of interest.  After anesthetizing the skin we made an incision centered at the 2 o'clock position.  We carried the incision down into the breast tissue and excised the area of interest, including the seed.  This was marked with a short stitch superiorly and a long stitch laterally.  A specimen mammogram was performed and sent via PACS to the Breast Center.  The breast radiologist confirmed the presence of the area of interest including the calcifications, the seed and the clip which had been placed at the time of her biopsy.  Hemostasis was maintained throughout with electrocautery.  On the medial aspect of the biopsy site, there was hemosiderin deposition and small amount of hematoma from her previous core biopsy.  This area was also excised and sent with the primary specimen.   Marking clips were placed.  The skin was closed with running 4-0 Vicryl subcuticular suture and Steri-Strips.  The patient tolerated the procedure well.  Sponge and instrument counts were correct.    Cristi Arndt MD

## 2020-01-24 NOTE — ANESTHESIA POSTPROCEDURE EVALUATION
Patient: Brii Taylor    Procedure(s):  LEFT BREAST SEED LOCALIZED EXCISIONAL BIOPSY    Diagnosis:Atypical lobular hyperplasia (ALH) of left breast [N60.92]  Diagnosis Additional Information: No value filed.    Anesthesia Type:  General, LMA    Note:  Anesthesia Post Evaluation    Patient location during evaluation: PACU  Patient participation: Able to fully participate in evaluation  Level of consciousness: awake and alert  Pain management: adequate  Airway patency: patent  Cardiovascular status: acceptable  Respiratory status: acceptable  Hydration status: acceptable  PONV: controlled             Last vitals:  Vitals:    01/24/20 0727   BP: 132/82   Resp: 16   Temp: 97.1  F (36.2  C)   SpO2: 99%         Electronically Signed By: Marcial Pierre MD  January 24, 2020  10:55 AM

## 2020-01-24 NOTE — PROGRESS NOTES
SBAR Seed Localization    SITUATION:  Patient to breast imaging center for imaging guided seed localizations before breast lumpectomy or excision biopsy without sentinel node injection.    BACKGROUND:  Breast imaging cancer, breast abnormality  Ordered procedure completed: Yes  Special needs identified: Yes     ASSESSMENT:  SBAR report called to patient care unit because of unexpected event in radiology: No  Allergies and medication list reviewed prior to procedure. Yes  Skin cleansed with ChloraPrep One-Step.  Anesthesia: approximately 5ml of 1% Lidocaine injection subcutaneous before seed insertion administered by the radiologist.   Gauze dressing over insertion site(s).  Post procedure mammogram completed: Yes    Patient tolerance: Patient tolerated procedure well.    RECOMMENDATIONS:  Patient transferred to Same Day Surgery in stable condition via wheelchair with Transport Services.    Please call [facility] [place] [phone] if there are any questions.

## 2020-01-24 NOTE — PROGRESS NOTES
SPIRITUAL HEALTH SERVICES Progress Note  [facility] Pre-Op    Saw pt Brii Taylor per her request for  support before her procedure.  Her spouse Keith was present.  Brii reported that she is having a lumpectomy and acknowledged feeling scared about the unknown in regards to what the post-surgical biopsy might reveal and follow-up treatment.  In addition to Keith, Brii named their two young adult daughters and her sister, who has a history of breast cancer.  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  They requested prayer and engaged in a few moments of guided contemplative breathing before praying.    Plan: This author and other chaplains remain available per pt/family request.    Keshav Cavazos M.Div., [facility]  Staff   Pager [phone]

## 2020-01-24 NOTE — DISCHARGE INSTRUCTIONS
Maximum acetaminophen (Tylenol) dose from all sources should not exce ed 4 grams (4000 mg) per day.  Last dose was at 10:00 am, next dose due at 4:00 pm    You received Celebrex, a nonsteroidal anti-inflammatory drug (NSAID) at 10:00.  Do not take any ibuprofen products until 10:00 pm.    Oxycodone 5 mg was given at 11:42 am, next dose due at 3:42 pm      HOME CARE FOLLOWING BREAST BIOPSY  TENA Cage, TEODORO Bryant, JOANN Washington    RESULTS:  You may call for your final pathology report after 1p.m. two working days after surgery.    INCISIONAL CARE:    If you have a dressing in place, keep clean and dry for 48 hours; you may replace the gauze if it becomes soiled.    After 48 hours you may remove the dressing and shower.  Do not submerse incision in water for 1 week.    Sutures will absorb and do not need to be removed.    If present, leave the steri-strips (white paper tapes) in place for 14 days after surgery.    You may expect a small amount of drainage from your incision.    A lump/ridge under the incision is normal and will gradually resolve.    SUPPORT:  Wear a bra for support and comfort for 3-7 days, day and night.    ACTIVITY:  Cautiously resume exercise and strenuous activities such as jogging, tennis, aerobics, etc. Also, be careful of stretching activities with operative side for two weeks.    DIET:  No restrictions.  Increased fluid intake is recommended. While taking pain medications, increase dietary fiber or add a fiber supplementation like Metamucil or Citrucel to help prevent constipation - a possible side effect of pain medications.    DISCOMFORT:  Local anesthetic placed at surgery should provide relief for 4-8 hours.  Begin taking pain pills before discomfort is severe.  Take the pain medication with some food, when possible, to minimize side effects.  Intermittent use of ice packs may help during the first 48 hours.  Expect gradual improvement.    RETURN  APPOINTMENT:  Schedule a follow-up visit 2-3 weeks post-op.  Office Phone:  [phone]     CONTACT US IF THE FOLLOWING DEVELOPS:   1. A fever that is above 101     2. If there is a large amount of drainage, bleeding, or swelling.   3. Severe pain that is not relieved by your prescription.   4. Drainage that is thick, cloudy, yellow, green or white.   5. Any other questions not answered by  Frequently Asked Questions  sheet.      FREQUENTLY ASKED QUESTIONS:    Q:  How should my incision look?    A:  Normally your incision will appear slightly swollen with light redness directly along the incision itself as it heals.  It may feel like a bump or ridge as the healing/scarring happens, and over time (3-4 months) this bump or ridge feeling should slowly go away.  In general, clear or pink watery drainage can be normal at first as your incision heals, but should decrease over time.    Q:  How do I know if my incision is infected?  A:  Look at your incision for signs of infection, like redness around the incision spreading to surrounding skin, or drainage of cloudy or foul-smelling drainage.  If you feel warm, check your temperature to see if you are running a fever.    **If any of these things occur, please notify the nurse at our office.  We may need you to come into the office for an incision check.      Q:  How do I take care of my incision?  A:  If you have a dressing in place - Starting the day after surgery, replace the dressing 1-2 times a day until there is no further drainage from the incision.  At that time, a dressing is no longer needed.  Try to minimize tape on the skin if irritation is occurring at the tape sites.  If you have significant irritation from tape on the skin, please call the office to discuss other method of dressing your incision.    Small pieces of tape called  steri-strips  may be present directly overlying your incision; these may be removed 10 days after surgery unless otherwise specified  by your surgeon.  If these tapes start to loosen at the ends, you may trim them back until they fall off or are removed.      Q:  There is a piece of tape or a sticky  lead  still on my skin.  Can I remove this?  A:  Sometimes the sticky  leads  used for monitoring during surgery or for evaluation in the emergency department are not all removed while you are in the hospital.  These sometimes have a tab or metal dot on them.  You can easily remove these on your own, like taking off a band-aid.  If there is a gel substance under the  lead , simply wipe/clean it off with a washcloth or paper towel.      Q:  What can I do to minimize constipation (very hard stools, or lack of stools)?  A:  Stay well hydrated.  Increase your dietary fiber intake or take a fiber supplement -with plenty of water.  Walk around frequently.  You may consider an over-the-counter stool-softener.  Your Pharmacist can assist you with choosing one that is stocked at your pharmacy.  Constipation is also one of the most common side effects of pain medication.  If you are using pain medication, be pro-active and try to PREVENT problems with constipation by taking the steps above BEFORE constipation becomes a problem.    Q:  What do I do if I need more pain medications?  A:  Call the office to receive refills.  Be aware that certain pain meds cannot be called into a pharmacy and actually require a paper prescription.  A change may be made in your pain med as you progress thru your recovery period or if you have side effects to certain meds.    --Pain meds are NOT refilled after 5pm on weekdays, and NOT AT ALL on the weekends, so please look ahead to prevent problems.      Q:  Why am I having a hard time sleeping now that I am at home?  A:  Many medications you receive while you are in the hospital can impact your sleep for a number of days after your surgery/hospitalization.  Decreased level of activity and naps during the day may also make sleeping  at night difficult.  Try to minimize day-time naps, and get up frequently during the day to walk around your home during your recovery time.  Sleep aides may be of some help, but are not recommended for long-term use.      Q:  I am having some back discomfort.  What should I do?  A:  This may be related to certain positioning that was required for your surgery, extended periods of time in bed, or other changes in your overall activity level.  You may try ice, heat, acetaminophen, or ibuprofen to treat this temporarily.  Note that many pain medications have acetaminophen in them and would state this on the prescription bottle.  Be sure not to exceed the maximum of 4000mg per day of acetaminophen.     **If the pain you are having does not resolve, is severe, or is a flare of back pain you have had on other occasions prior to surgery, please contact your primary physician for further recommendations or for an appointment to be examined at their office.    Q:  Why am I having headaches?  A:  Headaches can be caused by many things:  caffeine withdrawal, use of pain meds, dehydration, high blood pressure, lack of sleep, over-activity/exhaustion, flare-up of usual migraine headaches.  If you feel this is related to muscle tension (a band-like feeling around the head, or a pressure at the low-back of the head) you may try ice or heat to this area.  You may need to drink more fluids (try electrolyte drink like Gatorade), rest, or take your usual migraine medications.   **If your headaches do not resolve, worsen, are accompanied by other symptoms, or if your blood pressure is high, please call your primary physician for recommendation and/or examination.    Q:  I am unable to urinate.  What do I do?  A:  A small percentage of people can have difficulty urinating initially after surgery.  This includes being able to urinate only a very small amount at a time and feeling discomfort or pressure in the very low abdomen.  This is  called  urinary retention , and is actually an urgent situation.  Proceed to your nearest Emergency department for evaluation (not an Urgent Care Center).  Sometimes the bladder does not work correctly after certain medications you receive during surgery, or related to certain procedures.  You may need to have a catheter placed until your bladder recovers.  When planning to go to an Emergency department, it may help to call the ER to let them know you are coming in for this problem after a surgery.  This may help you get in quicker to be evaluated.  **If you have symptoms of a urinary tract infection, please contact your primary physician for the proper evaluation and treatment.          If you have other questions, please call the office Monday thru Friday between 8am and 5pm to discuss with the nurse or physician assistant.  #[phone]    There is a surgeon ON CALL on weekday evenings and over the weekend in case of urgent need only, and may be contacted at the same number.    If you are having an emergency, call 911 or proceed to your nearest emergency department.            GENERAL ANESTHESIA OR SEDATION ADULT DISCHARGE INSTRUCTIONS   SPECIAL PRECAUTIONS FOR 24 HOURS AFTER SURGERY    IT IS NOT UNUSUAL TO FEEL LIGHT-HEADED OR FAINT, UP TO 24 HOURS AFTER SURGERY OR WHILE TAKING PAIN MEDICATION.  IF YOU HAVE THESE SYMPTOMS; SIT FOR A FEW MINUTES BEFORE STANDING AND HAVE SOMEONE ASSIST YOU WHEN YOU GET UP TO WALK OR USE THE BATHROOM.    YOU SHOULD REST AND RELAX FOR THE NEXT 24 HOURS AND YOU MUST MAKE ARRANGEMENTS TO HAVE SOMEONE STAY WITH YOU FOR AT LEAST 24 HOURS AFTER YOUR DISCHARGE.  AVOID HAZARDOUS AND STRENUOUS ACTIVITIES.  DO NOT MAKE IMPORTANT DECISIONS FOR 24 HOURS.    DO NOT DRIVE ANY VEHICLE OR OPERATE MECHANICAL EQUIPMENT FOR 24 HOURS FOLLOWING THE END OF YOUR SURGERY.  EVEN THOUGH YOU MAY FEEL NORMAL, YOUR REACTIONS MAY BE AFFECTED BY THE MEDICATION YOU HAVE RECEIVED.    DO NOT DRINK ALCOHOLIC  BEVERAGES FOR 24 HOURS FOLLOWING YOUR SURGERY.    DRINK CLEAR LIQUIDS (APPLE JUICE, GINGER ALE, 7-UP, BROTH, ETC.).  PROGRESS TO YOUR REGULAR DIET AS YOU FEEL ABLE.    YOU MAY HAVE A DRY MOUTH, A SORE THROAT, MUSCLES ACHES OR TROUBLE SLEEPING.  THESE SHOULD GO AWAY AFTER 24 HOURS.    CALL YOUR DOCTOR FOR ANY OF THE FOLLOWING:  SIGNS OF INFECTION (FEVER, GROWING TENDERNESS AT THE SURGERY SITE, A LARGE AMOUNT OF DRAINAGE OR BLEEDING, SEVERE PAIN, FOUL-SMELLING DRAINAGE, REDNESS OR SWELLING.    IT HAS BEEN OVER 8 TO 10 HOURS SINCE SURGERY AND YOU ARE STILL NOT ABLE TO URINATE (PASS WATER).

## 2020-01-24 NOTE — ANESTHESIA PREPROCEDURE EVALUATION
Anesthesia Pre-Procedure Evaluation    Patient: Brii Taylor   MRN: [id number] : 1965          Preoperative Diagnosis: Atypical lobular hyperplasia (ALH) of left breast [N60.92]    Procedure(s):  LEFT BREAST SEED LOCALIZED EXCISIONAL BIOPSY    Past Medical History:   Diagnosis Date     Anxiety     sees psychiatrist     Arthritis      Heart murmur      Migraine, unspecified, without mention of intractable migraine without mention of status migrainosus      Sleep apnea      Sleep disturbance, unspecified     hx sleep apnea     Viral warts, unspecified      Past Surgical History:   Procedure Laterality Date     COLONOSCOPY Left 2017    Procedure: COLONOSCOPY;  Colonoscopy; difficulty in advancing scope (tight corner) so used biopsy forcep to follow/ advance scope;  Surgeon: Babar Gramajo MD;  Location:  GI     TONSILLECTOMY       Anesthesia Evaluation     . Pt has had prior anesthetic. Type: General    No history of anesthetic complications          ROS/MED HX    ENT/Pulmonary:     (+)sleep apnea, uses CPAP , . .    Neurologic:     (+)migraines,     Cardiovascular:     (+) ----. : . . . :. valvular problems/murmurs type: MVP .      (-) CAD, syncope and irregular heartbeat/palpitations   METS/Exercise Tolerance:  >4 METS   Hematologic:  - neg hematologic  ROS       Musculoskeletal:   (+) arthritis,  -       GI/Hepatic:  - neg GI/hepatic ROS      (-) GERD   Renal/Genitourinary:  - ROS Renal section negative       Endo:  - neg endo ROS       Psychiatric:     (+) psychiatric history anxiety      Infectious Disease:  - neg infectious disease ROS       Malignancy:   (+) Malignancy History of Breast          Other:    (+) No chance of pregnancy                         Physical Exam  Normal systems: cardiovascular, pulmonary and dental    Airway   Mallampati: II  TM distance: >3 FB  Neck ROM: full    Dental     Cardiovascular       Pulmonary             Lab Results   Component Value Date    WBC 7.9  "01/20/2020    HGB 14.0 01/20/2020    HCT 43.0 01/20/2020     01/20/2020    CRP 3.1 03/08/2019     01/20/2020    POTASSIUM 4.1 01/20/2020    CHLORIDE 106 01/20/2020    CO2 27 01/20/2020    BUN 14 01/20/2020    CR 0.66 01/20/2020    GLC 81 01/20/2020    KENNETH 9.1 01/20/2020    ALBUMIN 3.9 01/20/2020    PROTTOTAL 7.2 01/20/2020    ALT 17 01/20/2020    AST 18 01/20/2020    ALKPHOS 59 01/20/2020    BILITOTAL 0.6 01/20/2020    TSH 2.26 03/08/2019       Preop Vitals  BP Readings from Last 3 Encounters:   01/24/20 132/82   01/20/20 116/76   12/30/19 116/78    Pulse Readings from Last 3 Encounters:   01/20/20 76   12/30/19 84   11/04/19 74      Resp Readings from Last 3 Encounters:   01/24/20 16   01/20/20 16   12/30/19 16    SpO2 Readings from Last 3 Encounters:   01/24/20 99%   01/20/20 98%   12/30/19 97%      Temp Readings from Last 1 Encounters:   01/24/20 97.1  F (36.2  C) (Temporal)    Ht Readings from Last 1 Encounters:   01/24/20 1.651 m (5' 5\")      Wt Readings from Last 1 Encounters:   01/24/20 63.5 kg (140 lb)    Estimated body mass index is 23.3 kg/m  as calculated from the following:    Height as of this encounter: 1.651 m (5' 5\").    Weight as of this encounter: 63.5 kg (140 lb).       Anesthesia Plan      History & Physical Review  History and physical reviewed and following examination; no interval change.    ASA Status:  2 .    NPO Status:  > 8 hours    Plan for General and LMA with Intravenous induction. Maintenance will be Balanced.    PONV prophylaxis:  Ondansetron (or other 5HT-3) and Dexamethasone or Solumedrol       Postoperative Care  Postoperative pain management:  IV analgesics, Oral pain medications and Multi-modal analgesia.      Consents  Anesthetic plan, risks, benefits and alternatives discussed with:  Patient..                 Marcial Pierre MD                    .  "

## 2020-01-24 NOTE — ANESTHESIA CARE TRANSFER NOTE
Patient: Brii Taylor    Procedure(s):  LEFT BREAST SEED LOCALIZED EXCISIONAL BIOPSY    Diagnosis: Atypical lobular hyperplasia (ALH) of left breast [N60.92]  Diagnosis Additional Information: No value filed.    Anesthesia Type:   General, LMA     Note:  Airway :Room Air  Patient transferred to:PACU  Handoff Report: Identifed the Patient, Identified the Reponsible Provider, Reviewed the pertinent medical history, Discussed the surgical course, Reviewed Intra-OP anesthesia mangement and issues during anesthesia, Set expectations for post-procedure period and Allowed opportunity for questions and acknowledgement of understanding      Vitals: (Last set prior to Anesthesia Care Transfer)    CRNA VITALS  1/24/2020 1022 - 1/24/2020 1058      1/24/2020             Pulse:  90    SpO2:  98 %    Resp Rate (observed):  (!) 6                Electronically Signed By: ANGELA Solomon CRNA  January 24, 2020  10:58 AM

## 2020-01-27 LAB — COPATH REPORT: NORMAL

## 2020-01-29 ENCOUNTER — TELEPHONE (OUTPATIENT)
Dept: MAMMOGRAPHY | Facility: CLINIC | Age: 55
End: 2020-01-29

## 2020-01-29 ENCOUNTER — TELEPHONE (OUTPATIENT)
Dept: SURGERY | Facility: CLINIC | Age: 55
End: 2020-01-29

## 2020-01-29 NOTE — TELEPHONE ENCOUNTER
Patient is calling for Pathology results.  I informed patient that Dr. Arndt is out of the office today, but I will leave him a message to call her with results when he is in clinic tomorrow.    Patient agrees with plan, but anxious to get results.     Gave patient ph# for ANGELITA Garcia at [facility] to call for results if she prefers to speak with someone today.

## 2020-01-29 NOTE — TELEPHONE ENCOUNTER
Patient calling anxious for results.  I explained Dr Arndt will call her when he is back in the office.  I was able to give her a brief report, explaining Dr Arndt will go into more detail and make a plan for moving forward.  Patient will be expecting his call.

## 2020-02-05 NOTE — TELEPHONE ENCOUNTER
ONCOLOGY INTAKE: Records Information      APPT INFORMATION:  Referring provider:  Dr. Cristi Arndt MD  Referring provider s clinic:  [facility]  Reason for visit/diagnosis:  lobular carcinoma   Has patient been notified of appointment date and time?: Yes    RECORDS INFORMATION:  Were the records received with the referral (via [employer])? No    Has patient been seen for any external appt for this diagnosis? Yes    If yes, where? [facility]      Has patient had any imaging or procedures outside of [place]  [facility] for this condition? Yes      If Yes, where? [facility]      ADDITIONAL INFORMATION:  None

## 2020-02-06 NOTE — TELEPHONE ENCOUNTER
RECORDS STATUS - BREAST    RECORDS REQUESTED FROM: Epic/[facility]   DATE REQUESTED: 2/19/2020    NOTES DETAILS STATUS   OFFICE NOTE from referring provider Complete [facility]   OFFICE NOTE from medical oncologist N/A    OFFICE NOTE from surgeon Complete Epic- left breast seed localized excisional biopsy    OFFICE NOTE from radiation oncologist     DISCHARGE SUMMARY from hospital Complete Epic 1/24/2020 Atypical lobular hyperplasia    DISCHARGE REPORT from the ER     OPERATIVE REPORT Complete Epic- Left Breast Seed Localized Excision 1/24/2020    MEDICATION LIST Complete [facility]   CLINICAL TRIAL TREATMENTS TO DATE N/A    LABS     PATHOLOGY REPORTS  (Tissue diagnosis, Stage, ER/KS percentage positive and intensity of staining, HER2 IHC, FISH, and all biopsies from breast and any distant metastasis)                 Complete Epic-1/24/2020     Breast, left, radioactive seed localized excisional biopsy:   - Changes of prior biopsy site.   - Lobular carcinoma in situ.    GENONOMIC TESTING     TYPE:   (Next Generation Sequencing, including Foundation One testing, and Oncotype score)     IMAGING (NEED IMAGES & REPORT)     CT SCANS Complete 3/20/2017       MRI     MAMMO Complete PACS- 1/24/2020, 12/23/2019, 12/11/2019     formerly [facility]-7/7/2010   ULTRASOUND Complete 1/24/2020   PET     BONE SCAN     BRAIN MRI       All records are in [facility]- no need to call pt.

## 2020-02-19 ENCOUNTER — ONCOLOGY VISIT (OUTPATIENT)
Dept: ONCOLOGY | Facility: CLINIC | Age: 55
End: 2020-02-19
Attending: INTERNAL MEDICINE
Payer: COMMERCIAL

## 2020-02-19 ENCOUNTER — PRE VISIT (OUTPATIENT)
Dept: ONCOLOGY | Facility: CLINIC | Age: 55
End: 2020-02-19

## 2020-02-19 VITALS
SYSTOLIC BLOOD PRESSURE: 115 MMHG | OXYGEN SATURATION: 97 % | HEART RATE: 60 BPM | RESPIRATION RATE: 16 BRPM | DIASTOLIC BLOOD PRESSURE: 76 MMHG | WEIGHT: 138.2 LBS | BODY MASS INDEX: 23 KG/M2 | TEMPERATURE: 97.6 F

## 2020-02-19 DIAGNOSIS — Z80.3 FHX: BREAST CANCER IN FIRST DEGREE RELATIVE: ICD-10-CM

## 2020-02-19 DIAGNOSIS — D05.02 NEOPLASM OF LEFT BREAST, PRIMARY TUMOR STAGING CATEGORY TIS: LOBULAR CARCINOMA IN SITU (LCIS): Primary | ICD-10-CM

## 2020-02-19 PROCEDURE — G0463 HOSPITAL OUTPT CLINIC VISIT: HCPCS

## 2020-02-19 PROCEDURE — 99205 OFFICE O/P NEW HI 60 MIN: CPT | Performed by: INTERNAL MEDICINE

## 2020-02-19 RX ORDER — TAMOXIFEN CITRATE 20 MG/1
20 TABLET ORAL DAILY
Qty: 90 TABLET | Refills: 3 | Status: SHIPPED | OUTPATIENT
Start: 2020-02-19 | End: 2021-07-21

## 2020-02-19 ASSESSMENT — PAIN SCALES - GENERAL: PAINLEVEL: NO PAIN (0)

## 2020-02-19 NOTE — NURSING NOTE
"Oncology Rooming Note    February 19, 2020 12:56 PM   Brii Taylor is a 54 year old female who presents for:    Chief Complaint   Patient presents with     Oncology Clinic Visit     New patient     Initial Vitals: /76   Pulse 60   Temp 97.6  F (36.4  C)   Resp 16   Wt 62.7 kg (138 lb 3.2 oz)   SpO2 97%   BMI 23.00 kg/m   Estimated body mass index is 23 kg/m  as calculated from the following:    Height as of 1/24/20: 1.651 m (5' 5\").    Weight as of this encounter: 62.7 kg (138 lb 3.2 oz). Body surface area is 1.7 meters squared.  No Pain (0) Comment: Data Unavailable   No LMP recorded.  Allergies reviewed: Yes  Medications reviewed: Yes    Medications: Medication refills not needed today.  Pharmacy name entered into [employer]:    [id number] [place], [place] - [address] [id number] [place], [place] - [address]    Clinical concerns:   New patient.         Karime Tian CMA              "

## 2020-02-19 NOTE — LETTER
2/19/2020         RE: Brii Taylor  [address] [id number]        Dear Colleague,    Thank you for referring your patient, Brii Taylor, to the [facility]. Please see a copy of my visit note below.    Visit Date:   02/19/2020      Brii Rausch is a 54-year-old patient who is premenopausal and referred today by Dr. Cristi Melgar for a diagnosis of LCIS, atypical ductal hyperplasia, and flat epithelial atypia.  She is referred for Medical Oncology consultation.      CHIEF COMPLAINT   1.  LCIS.   2.  Atypical ductal hyperplasia.   3.  Flat epithelial atypia on the left breast.      HISTORY OF PRESENTING COMPLAINT:  Brii Rausch is a 54-year-old premenopausal patient who went for screening mammogram in November 2009, which showed some microcalcifications in the upper outer quadrant of left breast.  She went for a diagnostic mammogram in December showing a 2 cm area of calcifications in the upper outer quadrant, 5 cm from the nipple.  She went on to have a biopsy done, which was done at the end of December 2019 with a left stereotactic breast biopsy showing atypical lobular hyperplasia without evidence of malignancy.  There was also some flat epithelial atypia.  She then went on to have an excision done with Dr. Arndt on 01/24/2020.  She had LCIS, atypical ductal hyperplasia and flat epithelial atypia, no evidence of malignancy.  She is here today to discuss risk reduction.      FAMILY HISTORY:  Her sister had breast cancer at age 38 and then had a second primary in her early 50s.  Her sister apparently has had genetic testing done in [place], which was negative.  This testing was done approximately 2 years ago.  I have asked her to try and retrieve the testing results for me so I can review them.  Her sister also suffers from autoimmune disease with lupus and thyroiditis as well as all ankylosing spondylitis.  The patient's mother had non-Hodgkin's lymphoma at age 72.  She  has 1 other sister who has not been affected by cancer and 1 brother who suffers from autoimmune disease with Crohn's.  There is no history of ovarian cancer, no history of colon cancer.      PAST MEDICAL HISTORY:   1.  New diagnosis of LCIS as outlined above, left breast.   2.  History of occasional migraines.      SOCIAL HISTORY:  The patient is .  She has 2 children, both girls, 21 and 24.  She is a nonsmoker, drinks alcohol rarely.  She works as a .      PAST SURGICAL HISTORY:  History of tonsillectomy, history of left excisional biopsy.      MENSTRUAL HISTORY:  The patient was on oral contraceptive pills for approximately 20 years.  Since she came off the oral contraceptive pill, she has not noticed any major hot flashes or anything.      COMPREHENSIVE REVIEW OF SYSTEMS:  A 14-point comprehensive review of systems has been done with her and overall, she feels good.  She denies any respiratory, cardiovascular, genitourinary, musculoskeletal, gastrointestinal or neurological symptoms that are worrisome.      PHYSICAL EXAMINATION:   GENERAL:  She is well-appearing lady in no acute distress.   VITAL SIGNS:  Stable.   HEENT:  Oropharynx clear, mucous membranes moist.   NECK:  Has no masses or goiter.   NECK:  There is no cervical, supraclavicular or infraclavicular adenopathy.   CHEST:  Clear to auscultation and percussion bilaterally.   HEART:  Sounds 1 and 2 normal.  No added sounds or murmurs.   BREASTS:  The patient's right breast normal, no palpable masses.  Right axilla negative.  Left breast:  Status post excision in the upper outer quadrant.  Scar looks good.  There are no palpable masses on the left breast.  The left axilla negative.   GASTROINTESTINAL:  Abdomen is soft and nontender, no hepatosplenomegaly.   EXTREMITIES:  Legs are without tenderness or edema.   NEUROLOGIC:  Peripheral neurological exam grossly intact.   SKIN:  Has no rashes or lesions.      DATA REVIEW:  I have  reviewed all of her pathology as well as all of her Radiology.      IMPRESSION:  This is a 54-year-old premenopausal/perimenopausal female with a diagnosis of LCIS, atypical ductal hyperplasia and flat epithelial atypia of the left breast.  She is here for a discussion with regard to risk reduction.  I have discussed the nature and biology of LCIS in the atypical ductal hyperplasia as well as its associated risk of breast cancer.  She does have a strong family history of breast cancer.  I have asked her to forward me the genetics of her sister that was done in [place] about 2 years ago, so I can make sure she has had a full panel done.        With regard to her risk reduction, I have discussed with her our close observation protocol, which would be to continue on yearly mammograms and also yearly breast MRIs.  Her last mammogram was in November, so a breast MRI would be due in May or June 2020.  I have also discussed getting a physical exam every 6 months.  For chemo prevention, we either use tamoxifen or an aromatase inhibitor.  Because of her menopausal status, I would lean more towards tamoxifen in her.  We have discussed the risks, benefits and side effects of tamoxifen in detail.  We specifically discussed the minor side effects of tamoxifen as well as the risk of blood clotting and uterus cancer.  She understands them and is willing to proceed.  We have then gone on to discuss because of her diagnosis, coupled with her family history, some patients do elect to do bilateral mastectomies.  We have discussed the pros and cons of bilateral mastectomies in her specific situation  at this point.  She is going to continue with our close observation protocol and chemo prevention with tamoxifen to reduce her risk of breast cancer.  My plan would be to assess toxicity of the tamoxifen in approximately 4 months and get a bilateral breast MRI scan in 05/2020.  The patient has several questions today, which I have  answered to the best of my ability.      Consultation time today was 1 hour 5 minutes, the majority of time was spent on counseling and direction of patient care.         BRENDA BARNES MD             D: 2020   T: 2020   MT: VALENTINO      Name:     VIGNESH MCGILL   MRN:      [id number]        Account:      [id number]   :      1965           Visit Date:   2020      Document: [id number]       Again, thank you for allowing me to participate in the care of your patient.        Sincerely,        Brenda Barnes MD

## 2020-02-20 NOTE — PROGRESS NOTES
Visit Date:   02/19/2020      Brii Rausch is a 54-year-old patient who is premenopausal and referred today by Dr. Cristi Melgar for a diagnosis of LCIS, atypical ductal hyperplasia, and flat epithelial atypia.  She is referred for Medical Oncology consultation.      CHIEF COMPLAINT   1.  LCIS.   2.  Atypical ductal hyperplasia.   3.  Flat epithelial atypia on the left breast.      HISTORY OF PRESENTING COMPLAINT:  Brii Rausch is a 54-year-old premenopausal patient who went for screening mammogram in November 2009, which showed some microcalcifications in the upper outer quadrant of left breast.  She went for a diagnostic mammogram in December showing a 2 cm area of calcifications in the upper outer quadrant, 5 cm from the nipple.  She went on to have a biopsy done, which was done at the end of December 2019 with a left stereotactic breast biopsy showing atypical lobular hyperplasia without evidence of malignancy.  There was also some flat epithelial atypia.  She then went on to have an excision done with Dr. Arndt on 01/24/2020.  She had LCIS, atypical ductal hyperplasia and flat epithelial atypia, no evidence of malignancy.  She is here today to discuss risk reduction.      FAMILY HISTORY:  Her sister had breast cancer at age 38 and then had a second primary in her early 50s.  Her sister apparently has had genetic testing done in [place], which was negative.  This testing was done approximately 2 years ago.  I have asked her to try and retrieve the testing results for me so I can review them.  Her sister also suffers from autoimmune disease with lupus and thyroiditis as well as all ankylosing spondylitis.  The patient's mother had non-Hodgkin's lymphoma at age 72.  She has 1 other sister who has not been affected by cancer and 1 brother who suffers from autoimmune disease with Crohn's.  There is no history of ovarian cancer, no history of colon cancer.      PAST MEDICAL HISTORY:   1.  New diagnosis of LCIS  as outlined above, left breast.   2.  History of occasional migraines.      SOCIAL HISTORY:  The patient is .  She has 2 children, both girls, 21 and 24.  She is a nonsmoker, drinks alcohol rarely.  She works as a .      PAST SURGICAL HISTORY:  History of tonsillectomy, history of left excisional biopsy.      MENSTRUAL HISTORY:  The patient was on oral contraceptive pills for approximately 20 years.  Since she came off the oral contraceptive pill, she has not noticed any major hot flashes or anything.      COMPREHENSIVE REVIEW OF SYSTEMS:  A 14-point comprehensive review of systems has been done with her and overall, she feels good.  She denies any respiratory, cardiovascular, genitourinary, musculoskeletal, gastrointestinal or neurological symptoms that are worrisome.      PHYSICAL EXAMINATION:   GENERAL:  She is well-appearing lady in no acute distress.   VITAL SIGNS:  Stable.   HEENT:  Oropharynx clear, mucous membranes moist.   NECK:  Has no masses or goiter.   NECK:  There is no cervical, supraclavicular or infraclavicular adenopathy.   CHEST:  Clear to auscultation and percussion bilaterally.   HEART:  Sounds 1 and 2 normal.  No added sounds or murmurs.   BREASTS:  The patient's right breast normal, no palpable masses.  Right axilla negative.  Left breast:  Status post excision in the upper outer quadrant.  Scar looks good.  There are no palpable masses on the left breast.  The left axilla negative.   GASTROINTESTINAL:  Abdomen is soft and nontender, no hepatosplenomegaly.   EXTREMITIES:  Legs are without tenderness or edema.   NEUROLOGIC:  Peripheral neurological exam grossly intact.   SKIN:  Has no rashes or lesions.      DATA REVIEW:  I have reviewed all of her pathology as well as all of her Radiology.      IMPRESSION:  This is a 54-year-old premenopausal/perimenopausal female with a diagnosis of LCIS, atypical ductal hyperplasia and flat epithelial atypia of the left breast.  She  is here for a discussion with regard to risk reduction.  I have discussed the nature and biology of LCIS in the atypical ductal hyperplasia as well as its associated risk of breast cancer.  She does have a strong family history of breast cancer.  I have asked her to forward me the genetics of her sister that was done in [place] about 2 years ago, so I can make sure she has had a full panel done.        With regard to her risk reduction, I have discussed with her our close observation protocol, which would be to continue on yearly mammograms and also yearly breast MRIs.  Her last mammogram was in November, so a breast MRI would be due in May or June 2020.  I have also discussed getting a physical exam every 6 months.  For chemo prevention, we either use tamoxifen or an aromatase inhibitor.  Because of her menopausal status, I would lean more towards tamoxifen in her.  We have discussed the risks, benefits and side effects of tamoxifen in detail.  We specifically discussed the minor side effects of tamoxifen as well as the risk of blood clotting and uterus cancer.  She understands them and is willing to proceed.  We have then gone on to discuss because of her diagnosis, coupled with her family history, some patients do elect to do bilateral mastectomies.  We have discussed the pros and cons of bilateral mastectomies in her specific situation  at this point.  She is going to continue with our close observation protocol and chemo prevention with tamoxifen to reduce her risk of breast cancer.  My plan would be to assess toxicity of the tamoxifen in approximately 4 months and get a bilateral breast MRI scan in 05/2020.  The patient has several questions today, which I have answered to the best of my ability.      Consultation time today was 1 hour 5 minutes, the majority of time was spent on counseling and direction of patient care.         BRENDA SWANSON MD             D: 02/19/2020   T: 02/19/2020   MT: VALENTINO       Name:     VIGNESH MCGILL   MRN:      [id number]        Account:      [id number]   :      1965           Visit Date:   2020      Document: [id number]

## 2020-02-28 ENCOUNTER — PATIENT OUTREACH (OUTPATIENT)
Dept: ONCOLOGY | Facility: CLINIC | Age: 55
End: 2020-02-28

## 2020-02-28 NOTE — PROGRESS NOTES
Patient called to express concern regarding information that she read on the [facility] website regarding an interaction between Tamoxifen and her antidepressant Zoloft. Writer informed the patient that her concern will be forwarded to Dr. Barnes for review and that interactions regarding Cyp2D6 are often looked at on a case by case scenario as some antidepressants are felt to have more interaction potential than others. The effectiveness of the patient's current antidepressant therapy is also considered in the decision making. Patient verbalized understanding that Dr. Barnes is out of the office today but will be back on Monday.   Marietta Sebastian RN on 2/28/2020 at 10:12 AM

## 2020-03-03 NOTE — PROGRESS NOTES
Per Dr Barnes, it is fine for pt to be on both the tamoxifen and zoloft. Dr Barnes states that although some of the studies show that there may be an interaction, other studies have contradicted this information. Dr Barnes does not have any concerns about pt being on both of these medications.  Left a message for pt to call back to discuss.  Raeann Shankar RN, BSN, OCN, CBCN

## 2020-03-05 NOTE — PROGRESS NOTES
Talked with pt today. Pt was given recommendations per Dr Barnes. Pt verbalized understanding.  Pt also has another question. States that she has been having increased frequency of urination for the past 3-4 days. Also c/o bladder pressure and irritation. Denies dysuria or fever. Does also report that she has a history of lower back pain, but she has noticed an increase in lower back pain for the past few days.   Advised pt that she should contact her PCP today for evaluation. Patient verbalized understanding and agreement with plan.  Pt was instructed to call the clinic with any questions, concerns, or worsening symptoms.   Raeann Shankar, RN, BSN, OCN, CBCN

## 2020-05-26 PROBLEM — D05.00 BREAST NEOPLASM, TIS (LCIS): Status: ACTIVE | Noted: 2020-05-26

## 2020-06-25 ENCOUNTER — HOSPITAL ENCOUNTER (OUTPATIENT)
Dept: MRI IMAGING | Facility: CLINIC | Age: 55
Discharge: HOME | End: 2020-06-25
Attending: INTERNAL MEDICINE | Admitting: INTERNAL MEDICINE
Payer: COMMERCIAL

## 2020-06-25 DIAGNOSIS — Z80.3 FHX: BREAST CANCER IN FIRST DEGREE RELATIVE: ICD-10-CM

## 2020-06-25 DIAGNOSIS — D05.02 NEOPLASM OF LEFT BREAST, PRIMARY TUMOR STAGING CATEGORY TIS: LOBULAR CARCINOMA IN SITU (LCIS): ICD-10-CM

## 2020-06-25 PROCEDURE — 77049 MRI BREAST C-+ W/CAD BI: CPT

## 2020-06-25 PROCEDURE — A9585 GADOBUTROL INJECTION: HCPCS | Performed by: INTERNAL MEDICINE

## 2020-06-25 PROCEDURE — 25500064 ZZH RX 255 OP 636: Performed by: INTERNAL MEDICINE

## 2020-06-25 RX ORDER — GADOBUTROL 604.72 MG/ML
7.5 INJECTION INTRAVENOUS ONCE
Status: COMPLETED | OUTPATIENT
Start: 2020-06-25 | End: 2020-06-25

## 2020-06-25 RX ADMIN — GADOBUTROL 6.5 ML: 604.72 INJECTION INTRAVENOUS at 09:53

## 2020-07-01 ENCOUNTER — TRANSFERRED RECORDS (OUTPATIENT)
Dept: HEALTH INFORMATION MANAGEMENT | Facility: CLINIC | Age: 55
End: 2020-07-01

## 2020-07-08 DIAGNOSIS — G47.33 OSA (OBSTRUCTIVE SLEEP APNEA): Primary | ICD-10-CM

## 2020-07-21 VITALS — WEIGHT: 138 LBS | HEIGHT: 66 IN | BODY MASS INDEX: 22.18 KG/M2

## 2020-07-21 ASSESSMENT — MIFFLIN-ST. JEOR: SCORE: 1247.71

## 2020-07-21 NOTE — PATIENT INSTRUCTIONS
Your BMI is Body mass index is 22.27 kg/m .  Weight management is a personal decision.  If you are interested in exploring weight loss strategies, the following discussion covers the approaches that may be successful. Body mass index (BMI) is one way to tell whether you are at a healthy weight, overweight, or obese. It measures your weight in relation to your height.  A BMI of 18.5 to 24.9 is in the healthy range. A person with a BMI of 25 to 29.9 is considered overweight, and someone with a BMI of 30 or greater is considered obese. More than two-thirds of American adults are considered overweight or obese.  Being overweight or obese increases the risk for further weight gain. Excess weight may lead to heart disease and diabetes.  Creating and following plans for healthy eating and physical activity may help you improve your health.  Weight control is part of healthy lifestyle and includes exercise, emotional health, and healthy eating habits. Careful eating habits lifelong are the mainstay of weight control. Though there are significant health benefits from weight loss, long-term weight loss with diet alone may be very difficult to achieve- studies show long-term success with dietary management in less than 10% of people. Attaining a healthy weight may be especially difficult to achieve in those with severe obesity. In some cases, medications, devices and surgical management might be considered.  What can you do?  If you are overweight or obese and are interested in methods for weight loss, you should discuss this with your provider.     Consider reducing daily calorie intake by 500 calories.     Keep a food journal.     Avoiding skipping meals, consider cutting portions instead.    Diet combined with exercise helps maintain muscle while optimizing fat loss. Strength training is particularly important for building and maintaining muscle mass. Exercise helps reduce stress, increase energy, and improves fitness.  Increasing exercise without diet control, however, may not burn enough calories to loose weight.       Start walking three days a week 10-20 minutes at a time    Work towards walking thirty minutes five days a week     Eventually, increase the speed of your walking for 1-2 minutes at time    In addition, we recommend that you review healthy lifestyles and methods for weight loss available through the National [employer] of [employer] patient information sites:  http://win.niddk.nih.gov/publications/index.htm    And look into health and wellness programs that may be available through your health insurance provider, employer, local community center, or eloy club.    Weight management plan: Patient was referred to their PCP to discuss a diet and exercise plan.    FOR THE UPCOMING SLEEP STUDY:  Please  hold off on using oral appliance for 3 consecutive nights preceding the home sleep study for better accuracy.  When you do not use the appliance there is a high chance that your sleep quality may not be good and for  safety reasons we recommend you  not to drive or operate heavy machinery during those days and  use public transportation/car pool if needed.      AFTER YOU GET THE REPLACEMENT ORAL APPLIANCE:   Please  call our clinic at [phone] after the oral  appliance is adequately adjusted, which may take 2-3 months  in order to schedule a repeat home sleep study which will be done while you are using   The  appliance to check for the effectiveness of treatment of JUAN ALBERTO and he agreed. Followup will be scheduled after you complete  the sleep study to review the test results.

## 2020-07-21 NOTE — PROGRESS NOTES
"Brii Taylor is a 54 year old female who is being evaluated via a billable video visit.      The patient has been notified of following:     \"This video visit will be conducted via a call between you and your physician/provider. We have found that certain health care needs can be provided without the need for an in-person physical exam.  This service lets us provide the care you need with a video conversation.  If a prescription is necessary we can send it directly to your pharmacy.  If lab work is needed we can place an order for that and you can then stop by our lab to have the test done at a later time.    Video visits are billed at different rates depending on your insurance coverage.  Please reach out to your insurance provider with any questions.    If during the course of the call the physician/provider feels a video visit is not appropriate, you will not be charged for this service.\"    Patient has given verbal consent for Video visit? Yes  How would you like to obtain your AVS? MyChart  If you are dropped from the video visit, the video invite should be resent to: Send to e-mail at: [email or web address]  Will anyone else be joining your video visit? No        Video-Visit Details    Type of service:  Video Visit  Total time spent during the visit: 19 minutes  Video Start Time: 3:21 PM  Video End Time: 3:40 PM  Originating Location (pt. Location): Home    Distant Location (provider location):  [facility] [place]     Platform used for Video Visit: MD OMARI Lund [facility]  [address] [place] [address] [phone]  Dept: [phone]        Sleep Consultation Note:    Date on this visit: 7/22/2020    Brii Taylor is sent by Dr. Chelly Ceron DDS  from [place] [facility] for a sleep consultation regarding obstructive sleep apnea, needing an updated sleep study before " "ordering oral appliance    Primary Physician: Ct Arauz     Chief complaint: \"I was diagnosed with sleep apnea back in 2001 after the sleep study obtained at [place] [facility].  I tried CPAP machine which I could not tolerate and began using an oral somnomed sleep appliance, which worked well.  The appliance has worn down and I am in need of a new one.   Need to have an updated sleep study before the appliance can be ordered and should have a new sleep study regardless as it has been so long\".    Brii Taylor 54 year old female who presents for virtual visit today regarding previously diagnosed JUAN ALBERTO, needing an updated sleep study prior to her dentist ordering an oral appliance.  She was previously diagnosed with moderate  JUAN ALBERTO  during polysomnogram obtained through [place] [facility] in 2001(PSG reports are unavailable), tried CPAP therapy, but discontinued since she could not tolerate the device.  About  3-1/2 years ago she obtained oral appliance through a dentist in [place], [place].  She was using the device regularly during sleep and still continues to use the device though it has worn down.  She has been following up with her dentist  from [place] [facility], who had obtained impressions for a new (replacement)oral appliance and had recommended her to obtain sleep study to reevaluate for JUAN ALBERTO prior to obtaining the replacement appliance.    Prior to using the oral appliance, there were reports of snoring, observed apneas, gasping for air during sleep.  With the oral appliance,  her  reports occasional mild snoring, but there have not been any reports of observed apneas or gasping for air with the oral appliance.  She used to have frequent headaches prior to the initiation of treatment with oral appliance and with the oral appliance they have gotten better.      Sleep Schedule/Sleep Complaints//Daytime Functioning  She rarely reports difficulty " with falling asleep.   During workdays, Brii goes to bed at 1030 PM and wakes up at 6 AM, and gets about 7 hours of interrupted sleep..  It usually takes 5 minutes to fall asleep.  On non-work days, She falls asleep at 11PM and gets about 10 hours of interrupted sleep.  She wakes up 5-6 times throughout the night.  Night time awakenings occurs due to oral appliance getting dislodged that she needs to readjust and put back into place or tamoxifen induced hot flashes.  After awakening, She is able to fall back asleep after 5-10 minutes.  Patient reports drowsiness while driving for more than an hour.  She denies any close calls or motor vehicle accidents due to drowsiness while driving.  Brii naps daily if able to,(5-7/week),  for hour and a half and feels better after nap.  Patient reads in bed but does not  use electronics in bed.     SLEEP SCALES:  Patient's [place] Sleepiness score patient reports 13/24, but when ESS was reviewed with MA she endorsed score of  9/24   Insomnia severity index:13    Restless Legs symptoms  Brii does not complain of restlessness feelings in the legs.    Sleep Behaviors  She denies any night time behaviors - sleep walking, sleep talking, sleep eating.  She does not complain acting out dreams.      Social History  Brii is , she lives with her  and daughter.  She drinks caffeine, does not drink more than 3 beverages a day but has occasionally had last caffeine within 6 hours of bed time.  She does not drink alcohol. Patient  never smoked cigarettes. Patient does not use drugs.     Allergies:    Allergies   Allergen Reactions     Penicillins      PN: LW Reaction: rash       Medications:    Current Outpatient Medications   Medication Sig Dispense Refill     acetaminophen-caffeine (EXCEDRIN TENSION HEADACHE) 500-65 MG TABS Take 2 tablets by mouth every 6 hours as needed for mild pain       clonazePAM (KLONOPIN) 0.5 MG tablet TK 1 T PO D PRA  1     sertraline  (ZOLOFT) 100 MG tablet Take 1 tablet (100 mg) by mouth daily 90 tablet 3     tamoxifen 20 MG PO tablet Take 1 tablet (20 mg) by mouth daily 90 tablet 3     oxyCODONE (ROXICODONE) 5 MG tablet Take 1-2 tablets (5-10 mg) by mouth every 4 hours as needed for moderate to severe pain (Patient not taking: Reported on 7/22/2020) 10 tablet 0       Problem List:  Patient Active Problem List    Diagnosis Date Noted     Breast neoplasm, Tis (LCIS) 05/26/2020     Priority: Medium     Dx'd 1/24/2020       Obstructive sleep apnea syndrome 01/20/2020     Priority: Medium     Atypical lobular hyperplasia (ALH) of left breast 12/30/2019     Priority: Medium     Added automatically from request for surgery [id number]       Mitral valve prolapse 03/17/2017     Priority: Medium     Pulmonary nodules 03/13/2017     Priority: Medium     Onychomycosis 01/08/2013     Priority: Medium     Finger pain, right 01/08/2013     Priority: Medium     Plantar warts 01/08/2013     Priority: Medium     CARDIOVASCULAR SCREENING; LDL GOAL LESS THAN 160 10/31/2010     Priority: Medium     Contraception 05/29/2009     Priority: Medium     Anxiety 05/29/2009     Priority: Medium        Past Medical/Surgical History:  Past Medical History:   Diagnosis Date     Anxiety     sees psychiatrist     Arthritis      Heart murmur      Migraine, unspecified, without mention of intractable migraine without mention of status migrainosus      Sleep apnea      Sleep disturbance, unspecified     hx sleep apnea     Viral warts, unspecified      Past Surgical History:   Procedure Laterality Date     BIOPSY BREAST SEED LOCALIZATION Left 1/24/2020    Procedure: LEFT BREAST SEED LOCALIZED EXCISIONAL BIOPSY;  Surgeon: Cristi Arndt MD;  Location:  OR     COLONOSCOPY Left 12/18/2017    Procedure: COLONOSCOPY;  Colonoscopy; difficulty in advancing scope (tight corner) so used biopsy forcep to follow/ advance scope;  Surgeon: Babar Gramajo MD;  Location:  GI      TONSILLECTOMY         Social History:  Social History     Socioeconomic History     Marital status:      Spouse name: Not on file     Number of children: Not on file     Years of education: Not on file     Highest education level: Not on file   Occupational History     Not on file   Social Needs     Financial resource strain: Not on file     Food insecurity     Worry: Not on file     Inability: Not on file     Transportation needs     Medical: Not on file     Non-medical: Not on file   Tobacco Use     Smoking status: Never Smoker     Smokeless tobacco: Never Used   Substance and Sexual Activity     Alcohol use: No     Drug use: No     Sexual activity: Yes     Partners: Male     Birth control/protection: Pill   Lifestyle     Physical activity     Days per week: Not on file     Minutes per session: Not on file     Stress: Not on file   Relationships     Social connections     Talks on phone: Not on file     Gets together: Not on file     Attends [employer] service: Not on file     Active member of club or organization: Not on file     Attends meetings of clubs or organizations: Not on file     Relationship status: Not on file     Intimate partner violence     Fear of current or ex partner: Not on file     Emotionally abused: Not on file     Physically abused: Not on file     Forced sexual activity: Not on file   Other Topics Concern     Parent/sibling w/ CABG, MI or angioplasty before 65F 55M? Not Asked   Social History Narrative     Not on file       Family History:  Family history pertinent to sleep disorders: Brother and sister have JUAN ALBERTO  Family History   Problem Relation Age of Onset     Hypertension Mother      Cancer Mother 70        nonHodgkins lymphoma     Psychotic Disorder Mother         anxiety     Alzheimer Disease Father      Breast Cancer Sister         breast ca age 38     Gastrointestinal Disease Brother         crohns or colitis     No Known Problems Daughter      Colon Cancer No family hx of   "      Review of Systems:  A complete review of systems reviewed by me is negative with the exeption of what has been mentioned in the history of present illness,  and symptoms listed below, highlighted in red:  CONSTITUTIONAL: night sweats, NEGATIVE for weight gain/loss, fever, chills,or  drug allergies.  EYES: NEGATIVE for changes in vision, blind spots, double vision.  ENT: NEGATIVE for ear pain, sore throat, sinus pain, post-nasal drip, runny nose, bloody nose  CARDIAC: NEGATIVE for fast heartbeats or fluttering in chest, chest pain or pressure, breathlessness when lying flat, swollen legs or swollen feet.  NEUROLOGIC: headaches NEGATIVE, weakness or numbness in the arms or legs.  DERMATOLOGIC: NEGATIVE for rashes, new moles or change in mole(s)  PULMONARY: NEGATIVE SOB at rest, SOB with activity, dry cough, productive cough, coughing up blood, wheezing or whistling when breathing.    GASTROINTESTINAL: NEGATIVE for nausea or vomitting, loose or watery stools, fat or grease in stools, constipation, abdominal pain, bowel movements black in color or blood noted.  GENITOURINARY: NEGATIVE for pain during urination, blood in urine, urinating more frequently than usual  MUSCULOSKELETAL: NEGATIVE for muscle pain, bone or joint pain, swollen joints.  ENDOCRINE: NEGATIVE for increased thirst or urination, diabetes.  LYMPHATIC: NEGATIVE for swollen lymph nodes, lumps or bumps in the breasts or nipple discharge.  PSYCHE: NEGATIVE for depression, anxiety    Physical Examination:  Vitals: Ht 1.676 m (5' 6\")   Wt 62.6 kg (138 lb)   BMI 22.27 kg/m    BMI= Body mass index is 22.27 kg/m .         [place] Total Score 7/21/2020   Total score - [place] 9        General: No apparent distress, appropriately groomed  Head: Normocephalic, atraumatic  Eyes: no icterus, PERRL  Neck:Circumference: 13 inches  Chest: No cough, no audible wheezing, able to talk in full sentences  Skin: no rash  Psych: coherent speech, normal rate and volume, " able to articulate logical thoughts, able   to abstract reason, no tangential thoughts, no hallucinations   or delusions . Her affect is normal  Neuro:  Mental status: Alert and  Oriented X 3  Speech: normal   No focal neurological deficit    Other tests:  Last Comprehensive Metabolic Panel:  Sodium   Date Value Ref Range Status   01/20/2020 139 133 - 144 mmol/L Final     Potassium   Date Value Ref Range Status   01/20/2020 4.1 3.4 - 5.3 mmol/L Final     Chloride   Date Value Ref Range Status   01/20/2020 106 94 - 109 mmol/L Final     Carbon Dioxide   Date Value Ref Range Status   01/20/2020 27 20 - 32 mmol/L Final     Anion Gap   Date Value Ref Range Status   01/20/2020 6 3 - 14 mmol/L Final     Glucose   Date Value Ref Range Status   01/20/2020 81 70 - 99 mg/dL Final     Comment:     Non Fasting     Urea Nitrogen   Date Value Ref Range Status   01/20/2020 14 7 - 30 mg/dL Final     Creatinine   Date Value Ref Range Status   01/20/2020 0.66 0.52 - 1.04 mg/dL Final     GFR Estimate   Date Value Ref Range Status   01/20/2020 >90 >60 mL/min/[1.73_m2] Final     Comment:     Non  GFR Calc  Starting 12/18/2018, serum creatinine based estimated GFR (eGFR) will be   calculated using the Chronic Kidney Disease Epidemiology Collaboration   (CKD-EPI) equation.       Calcium   Date Value Ref Range Status   01/20/2020 9.1 8.5 - 10.1 mg/dL Final     Bilirubin Total   Date Value Ref Range Status   01/20/2020 0.6 0.2 - 1.3 mg/dL Final     Alkaline Phosphatase   Date Value Ref Range Status   01/20/2020 59 40 - 150 U/L Final     ALT   Date Value Ref Range Status   01/20/2020 17 0 - 50 U/L Final     AST   Date Value Ref Range Status   01/20/2020 18 0 - 45 U/L Final     CBC RESULTS:   Lab Test 01/20/20  1140   WBC 7.9   RBC 4.85   HGB 14.0   HCT 43.0   MCV 89   MCH 28.9   MCHC 32.6   RDW 12.7                  Impression/Report/Plan:    Previously diagnosed moderate obstructive sleep apnea: Prior history of CPAP  intolerance.  Patient has been using oral appliance for the past 3-1/2 years and is in need for replacement oral appliance since her current one has worn out.   With the oral appliance there is still concern for snoring- occasional and mild in intensity and some fatigue/EDS-daytime napping.  Recommended obtaining a home sleep study to reevaluate the sleep disordered breathing.  The study will be done  without the oral appliance.    She was instructed to hold off on using the oral appliance for 3 consecutive nights preceding the sleep study for better accuracy.  She  was instructed that when she does not use the appliance  there is a high chance that her sleep quality may not be good and for safety reasons she was instructed not to drive or operate heavy machinery during those days and to use public transportation/car pool if needed.     The plan is to communicate the results of the home study with the patient via virtual visit in 10 to 14 days after the test is completed.      I also discussed with the patient that after she gets the replacement oral appliance and once it is adequately adjusted, to call our  at [phone], to schedule a repeat home sleep study which will be done with the oral appliance to check the efficiency of the treatment for sleep apnea.       Encouraged to follow good sleep hygiene/behavioral techniques, including avoiding caffeine within 6 hours before bed.    Encouraged healthy diet, and exercise.    Patient was strongly advised to avoid driving, operating any heavy machinery or other hazardous situations while drowsy or sleepy.  Patient was counseled on the importance of driving while alert, to pull over if drowsy, or nap before getting into the vehicle if sleepy.        The above note was dictated using voice recognition software. Although reviewed after completion, some word and grammatical error may remain . Please contact the author for any  clarifications.    Tyrone Gusman MD  [address] [address] [phone]  Dept: [phone]

## 2020-07-22 ENCOUNTER — VIRTUAL VISIT (OUTPATIENT)
Dept: SLEEP MEDICINE | Facility: CLINIC | Age: 55
End: 2020-07-22
Payer: COMMERCIAL

## 2020-07-22 ENCOUNTER — VIRTUAL VISIT (OUTPATIENT)
Dept: ONCOLOGY | Facility: CLINIC | Age: 55
End: 2020-07-22
Attending: INTERNAL MEDICINE
Payer: COMMERCIAL

## 2020-07-22 DIAGNOSIS — D05.02 NEOPLASM OF LEFT BREAST, PRIMARY TUMOR STAGING CATEGORY TIS: LOBULAR CARCINOMA IN SITU (LCIS): ICD-10-CM

## 2020-07-22 DIAGNOSIS — D05.00 NEOPLASM OF BREAST, PRIMARY TUMOR STAGING CATEGORY TIS: LOBULAR CARCINOMA IN SITU (LCIS), UNSPECIFIED LATERALITY: ICD-10-CM

## 2020-07-22 DIAGNOSIS — Z80.3 FHX: BREAST CANCER IN FIRST DEGREE RELATIVE: Primary | ICD-10-CM

## 2020-07-22 DIAGNOSIS — G47.33 OSA (OBSTRUCTIVE SLEEP APNEA): Primary | ICD-10-CM

## 2020-07-22 PROCEDURE — 99214 OFFICE O/P EST MOD 30 MIN: CPT | Mod: 95 | Performed by: INTERNAL MEDICINE

## 2020-07-22 PROCEDURE — 40001009 ZZH VIDEO/TELEPHONE VISIT; NO CHARGE

## 2020-07-22 PROCEDURE — 99204 OFFICE O/P NEW MOD 45 MIN: CPT | Mod: 95 | Performed by: INTERNAL MEDICINE

## 2020-07-22 NOTE — PROGRESS NOTES
"Brii Taylor is a 54 year old female who is being evaluated via a billable video visit.      The patient has been notified of following:     \"This video visit will be conducted via a call between you and your physician/provider. We have found that certain health care needs can be provided without the need for an in-person physical exam.  This service lets us provide the care you need with a video conversation.  If a prescription is necessary we can send it directly to your pharmacy.  If lab work is needed we can place an order for that and you can then stop by our lab to have the test done at a later time.    Video visits are billed at different rates depending on your insurance coverage.  Please reach out to your insurance provider with any questions.    If during the course of the call the physician/provider feels a video visit is not appropriate, you will not be charged for this service.\"    Patient has given verbal consent for Video visit? Yes  How would you like to obtain your AVS? MyChart  If you are dropped from the video visit, the video invite should be resent to: [phone]  Will anyone else be joining your video visit? No        Video-Visit Details    Type of service:  Video Visit      "

## 2020-07-22 NOTE — LETTER
"    7/22/2020         RE: Brii Taylor  [address] [facility] [address]        Dear Colleague,    Thank you for referring your patient, Brii Taylor, to the [facility]. Please see a copy of my visit note below.    Brii Taylor is a 54 year old female who is being evaluated via a billable video visit.      The patient has been notified of following:     \"This video visit will be conducted via a call between you and your physician/provider. We have found that certain health care needs can be provided without the need for an in-person physical exam.  This service lets us provide the care you need with a video conversation.  If a prescription is necessary we can send it directly to your pharmacy.  If lab work is needed we can place an order for that and you can then stop by our lab to have the test done at a later time.    Video visits are billed at different rates depending on your insurance coverage.  Please reach out to your insurance provider with any questions.    If during the course of the call the physician/provider feels a video visit is not appropriate, you will not be charged for this service.\"    Patient has given verbal consent for Video visit? Yes  How would you like to obtain your AVS? MyChart  If you are dropped from the video visit, the video invite should be resent to: [phone]  Will anyone else be joining your video visit? No        Video-Visit Details    Type of service:  Video Visit        Visit Date:   07/22/2020      Brii Taylor is a 54-year-old patient who has been evaluated today with a video visit.      PATIENT LOCATION:  Home.      PHYSICIAN LOCATION:  [facility] [place].      VIDEO PLATFORM:  [employer].      LENGTH OF VISIT:  25 minutes.      HISTORY OF PRESENTING COMPLAINT:  Brii is a 54-year-old premenopausal patient who has a diagnosis of LCIS and atypical ductal hyperplasia as well as flat epithelial atypia in the upper outer quadrant of the left " breast.  She also has a family history for breast cancer with a sister with breast cancer at age 38 and then again at age 50.  Her sister had genetic testing done, which was negative.  The patient's mother had non-Hodgkin's lymphoma.  There is no other history of cancer.  I saw the patient in initial consultation in 02/2020, and we discussed getting every year an MRI scan and every year a mammogram and splitting them out once every 6 months.  She just recently had a mammogram done, which I reviewed today.  The MRI scan was done on 06/25/2020 and was negative for malignancy.  We will be due to do the mammogram in 12/2020.  She is on tamoxifen for risk reduction.  She seems to be tolerating it well.  She does get some hot flashes from it and some fatigue, but overall she is willing to continue on it.  She denies today, cough, shortness of breath, bone pain, any worrisome symptomatology.  I have recommended that she try some vitamin E for hot flashes, as those are her worst symptoms from the tamoxifen.      PAST MEDICAL HISTORY:  History of LCIS and atypical ductal hyperplasia.  History of migraines.      SOCIAL HISTORY:  The patient is .  She has 2 children.  She is a nonsmoker, drinks rarely.  She is a .      PAST SURGICAL HISTORY:  History of tonsillectomy, history of left excisional biopsy.      REVIEW OF SYSTEMS:  A 14-point comprehensive review of systems has been done with her and, apart from what is outlined above, is otherwise unremarkable.      PAIN ASSESSMENT:  The patient is in no pain today.      PHYSICAL EXAMINATION:   GENERAL:  She looks good.   EYES:  Eyes have no redness or discharge.   RESPIRATORY:  No cough or labored breathing.   MUSCULOSKELETAL:  She is moving all extremities.   NEUROLOGICAL:  She is alert and oriented x 3, no tremors.   SKIN:  She has no rashes or lesions.      The rest of her comprehensive physical exam has been deferred today due to video visit  restrictions and public health emergency.      DATA REVIEW:  I have reviewed her last mammogram and her most recent MRI.  I have written her up for her next mammogram.      IMPRESSION AND PLAN:  A 54-year-old premenopausal patient who is on tamoxifen for on active treatment with tamoxifen for lobular carcinoma in situ and atypical ductal hyperplasia of the left breast.  She has a positive family history for breast cancer with a sister who had breast cancer twice.  Her sister had genetic testing done, and that was negative.  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  We have talked about coronavirus precautions today.  I have written her for a mammogram in 2020.  I have recommended vitamin E for hot flashes.  She is to call me if she has any other problems.         BRENDA BARNES MD             D: 2020   T: 2020   MT: [place]      Name:     VIGNESH MCGILL   MRN:      [id number]        Account:      [id number]   :      1965           Visit Date:   2020      Document: [id number]       Again, thank you for allowing me to participate in the care of your patient.        Sincerely,        Brenda Barnes MD

## 2020-07-22 NOTE — LETTER
"    7/22/2020         RE: Brii Taylor  [address] [facility] [id number]        Dear Colleague,    Thank you for referring your patient, Brii Taylor, to the [facility]. Please see a copy of my visit note below.    Brii Taylor is a 54 year old female who is being evaluated via a billable video visit.      The patient has been notified of following:     \"This video visit will be conducted via a call between you and your physician/provider. We have found that certain health care needs can be provided without the need for an in-person physical exam.  This service lets us provide the care you need with a video conversation.  If a prescription is necessary we can send it directly to your pharmacy.  If lab work is needed we can place an order for that and you can then stop by our lab to have the test done at a later time.    Video visits are billed at different rates depending on your insurance coverage.  Please reach out to your insurance provider with any questions.    If during the course of the call the physician/provider feels a video visit is not appropriate, you will not be charged for this service.\"    Patient has given verbal consent for Video visit? Yes  How would you like to obtain your AVS? MyChart  If you are dropped from the video visit, the video invite should be resent to: [phone]  Will anyone else be joining your video visit? No        Video-Visit Details    Type of service:  Video Visit        Visit Date:   07/22/2020      Brii Taylor is a 54-year-old patient who has been evaluated today with a video visit.      PATIENT LOCATION:  Home.      PHYSICIAN LOCATION:  [facility].      VIDEO PLATFORM:  PolySuite.      LENGTH OF VISIT:  25 minutes.      HISTORY OF PRESENTING COMPLAINT:  Brii is a 54-year-old premenopausal patient who has a diagnosis of LCIS and atypical ductal hyperplasia as well as flat epithelial atypia in the upper outer quadrant of the left " breast.  She also has a family history for breast cancer with a sister with breast cancer at age 38 and then again at age 50.  Her sister had genetic testing done, which was negative.  The patient's mother had non-Hodgkin's lymphoma.  There is no other history of cancer.  I saw the patient in initial consultation in 02/2020, and we discussed getting every year an MRI scan and every year a mammogram and splitting them out once every 6 months.  She just recently had a mammogram done, which I reviewed today.  The MRI scan was done on 06/25/2020 and was negative for malignancy.  We will be due to do the mammogram in 12/2020.  She is on tamoxifen for risk reduction.  She seems to be tolerating it well.  She does get some hot flashes from it and some fatigue, but overall she is willing to continue on it.  She denies today, cough, shortness of breath, bone pain, any worrisome symptomatology.  I have recommended that she try some vitamin E for hot flashes, as those are her worst symptoms from the tamoxifen.      PAST MEDICAL HISTORY:  History of LCIS and atypical ductal hyperplasia.  History of migraines.      SOCIAL HISTORY:  The patient is .  She has 2 children.  She is a nonsmoker, drinks rarely.  She is a .      PAST SURGICAL HISTORY:  History of tonsillectomy, history of left excisional biopsy.      REVIEW OF SYSTEMS:  A 14-point comprehensive review of systems has been done with her and, apart from what is outlined above, is otherwise unremarkable.      PAIN ASSESSMENT:  The patient is in no pain today.      PHYSICAL EXAMINATION:   GENERAL:  She looks good.   EYES:  Eyes have no redness or discharge.   RESPIRATORY:  No cough or labored breathing.   MUSCULOSKELETAL:  She is moving all extremities.   NEUROLOGICAL:  She is alert and oriented x 3, no tremors.   SKIN:  She has no rashes or lesions.      The rest of her comprehensive physical exam has been deferred today due to video visit  restrictions and public health emergency.      DATA REVIEW:  I have reviewed her last mammogram and her most recent MRI.  I have written her up for her next mammogram.      IMPRESSION AND PLAN:  A 54-year-old premenopausal patient who is on tamoxifen for on active treatment with tamoxifen for lobular carcinoma in situ and atypical ductal hyperplasia of the left breast.  She has a positive family history for breast cancer with a sister who had breast cancer twice.  Her sister had genetic testing done, and that was negative.  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  We have talked about coronavirus precautions today.  I have written her for a mammogram in 2020.  I have recommended vitamin E for hot flashes.  She is to call me if she has any other problems.         BRENDA BARNES MD             D: 2020   T: 2020   MT: [place]      Name:     VIGNESH MCGILL   MRN:      [id number]        Account:      [id number]   :      1965           Visit Date:   2020      Document: [id number]       Again, thank you for allowing me to participate in the care of your patient.        Sincerely,        Brenda Barnes MD

## 2020-07-23 NOTE — PROGRESS NOTES
Visit Date:   07/22/2020      Brii Taylor is a 54-year-old patient who has been evaluated today with a video visit.      PATIENT LOCATION:  Home.      PHYSICIAN LOCATION:  [facility].      VIDEO PLATFORM:  Upside.      LENGTH OF VISIT:  25 minutes.      HISTORY OF PRESENTING COMPLAINT:  Brii is a 54-year-old premenopausal patient who has a diagnosis of LCIS and atypical ductal hyperplasia as well as flat epithelial atypia in the upper outer quadrant of the left breast.  She also has a family history for breast cancer with a sister with breast cancer at age 38 and then again at age 50.  Her sister had genetic testing done, which was negative.  The patient's mother had non-Hodgkin's lymphoma.  There is no other history of cancer.  I saw the patient in initial consultation in 02/2020, and we discussed getting every year an MRI scan and every year a mammogram and splitting them out once every 6 months.  She just recently had a mammogram done, which I reviewed today.  The MRI scan was done on 06/25/2020 and was negative for malignancy.  We will be due to do the mammogram in 12/2020.  She is on tamoxifen for risk reduction.  She seems to be tolerating it well.  She does get some hot flashes from it and some fatigue, but overall she is willing to continue on it.  She denies today, cough, shortness of breath, bone pain, any worrisome symptomatology.  I have recommended that she try some vitamin E for hot flashes, as those are her worst symptoms from the tamoxifen.      PAST MEDICAL HISTORY:  History of LCIS and atypical ductal hyperplasia.  History of migraines.      SOCIAL HISTORY:  The patient is .  She has 2 children.  She is a nonsmoker, drinks rarely.  She is a .      PAST SURGICAL HISTORY:  History of tonsillectomy, history of left excisional biopsy.      REVIEW OF SYSTEMS:  A 14-point comprehensive review of systems has been done with her and, apart from what is outlined  above, is otherwise unremarkable.      PAIN ASSESSMENT:  The patient is in no pain today.      PHYSICAL EXAMINATION:   GENERAL:  She looks good.   EYES:  Eyes have no redness or discharge.   RESPIRATORY:  No cough or labored breathing.   MUSCULOSKELETAL:  She is moving all extremities.   NEUROLOGICAL:  She is alert and oriented x 3, no tremors.   SKIN:  She has no rashes or lesions.      The rest of her comprehensive physical exam has been deferred today due to video visit restrictions and public health emergency.      DATA REVIEW:  I have reviewed her last mammogram and her most recent MRI.  I have written her up for her next mammogram.      IMPRESSION AND PLAN:  A 54-year-old premenopausal patient who is on tamoxifen for on active treatment with tamoxifen for lobular carcinoma in situ and atypical ductal hyperplasia of the left breast.  She has a positive family history for breast cancer with a sister who had breast cancer twice.  Her sister had genetic testing done, and that was negative.  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  We have talked about coronavirus precautions today.  I have written her for a mammogram in 2020.  I have recommended vitamin E for hot flashes.  She is to call me if she has any other problems.         BRENDA SWANSON MD             D: 2020   T: 2020   MT: [place]      Name:     VIGNESH MCGILL   MRN:      [id number]        Account:      [id number]   :      1965           Visit Date:   2020      Document: [id number]

## 2020-08-12 ENCOUNTER — OFFICE VISIT (OUTPATIENT)
Dept: SLEEP MEDICINE | Facility: CLINIC | Age: 55
End: 2020-08-12
Payer: COMMERCIAL

## 2020-08-12 ENCOUNTER — DOCUMENTATION ONLY (OUTPATIENT)
Dept: SLEEP MEDICINE | Facility: CLINIC | Age: 55
End: 2020-08-12

## 2020-08-12 DIAGNOSIS — G47.33 OSA (OBSTRUCTIVE SLEEP APNEA): ICD-10-CM

## 2020-08-12 PROCEDURE — G0399 HOME SLEEP TEST/TYPE 3 PORTA: HCPCS | Performed by: INTERNAL MEDICINE

## 2020-08-13 ENCOUNTER — DOCUMENTATION ONLY (OUTPATIENT)
Dept: SLEEP MEDICINE | Facility: CLINIC | Age: 55
End: 2020-08-13
Payer: COMMERCIAL

## 2020-08-13 NOTE — PROGRESS NOTES
HST POST-STUDY QUESTIONNAIRE    1. What time did you go to bed?  10:30  2. How long do you think it took to fall asleep?  45-60min  3. What time did you wake up to start the day?  8:40  4. Did you get up during the night at all?  yes  5. If you woke up, do you remember approximately what time(s)? 1:00, 2:30, 6:20  6. Did you have any difficulty with the equipment?  No  7. Did you us any type of treatment with this study?  None  8. Was the head of the bed elevated? No  9. Did you sleep in a recliner?  No  10. Did you stop using CPAP at least 3 days before this test?  NA  11. Any other information you'd like us to know?

## 2020-08-14 NOTE — PROGRESS NOTES
This HSAT was performed using a Noxturnal T3 device which recorded snore, sound, movement activity, body position, nasal pressure, oronasal thermal airflow, pulse, oximetry and both chest and abdominal respiratory effort. HSAT data was restricted to the time patient states they were in bed.     HSAT was scored using 1B 4% hypopnea rule.     HST AHI (Non-PAT): 20.7  Snoring was reported as intermittent.  Time with SpO2 below 89% was 1 minutes.   Overall signal quality was good     Pt will follow up with sleep provider to determine appropriate therapy.

## 2020-08-19 ENCOUNTER — VIRTUAL VISIT (OUTPATIENT)
Dept: SLEEP MEDICINE | Facility: CLINIC | Age: 55
End: 2020-08-19
Payer: COMMERCIAL

## 2020-08-19 VITALS — WEIGHT: 135 LBS | HEIGHT: 66 IN | BODY MASS INDEX: 21.69 KG/M2

## 2020-08-19 DIAGNOSIS — G47.33 OSA (OBSTRUCTIVE SLEEP APNEA): Primary | ICD-10-CM

## 2020-08-19 PROCEDURE — 99214 OFFICE O/P EST MOD 30 MIN: CPT | Mod: 95 | Performed by: INTERNAL MEDICINE

## 2020-08-19 ASSESSMENT — MIFFLIN-ST. JEOR: SCORE: 1234.11

## 2020-08-19 NOTE — PROGRESS NOTES
"Brii Taylor is a 54 year old female who is being evaluated via a billable video visit.      The patient has been notified of following:     \"This video visit will be conducted via a call between you and your physician/provider. We have found that certain health care needs can be provided without the need for an in-person physical exam.  This service lets us provide the care you need with a video conversation.  If a prescription is necessary we can send it directly to your pharmacy.  If lab work is needed we can place an order for that and you can then stop by our lab to have the test done at a later time.    Video visits are billed at different rates depending on your insurance coverage.  Please reach out to your insurance provider with any questions.    If during the course of the call the physician/provider feels a video visit is not appropriate, you will not be charged for this service.\"    Patient has given verbal consent for Video visit? Yes  How would you like to obtain your AVS? MyChart  If you are dropped from the video visit, the video invite should be resent to: Text to cell phone: [phone]  Will anyone else be joining your video visit? No        Video-Visit Details    Type of service:  Video Visit    Video Start Time: 3:09PM  Video End Time:3:26PM     Originating Location (pt. Location): Home    Distant Location (provider location):  [facility]     Platform used for Video Visit: MD OMARI Zuniga [facility]  [address] [facility] [id number] [phone]  Dept: [phone]      SLEEP CLINIC FOLLOW UP VISIT    Date of visit: 8/19/2020    Purpose of visit: review HST results    Brii Taylor is a 54 year old female with a history of previously diagnosed JUAN ALBERTO, who  needed an updated sleep study prior to her dentist ordering an oral appliance.  HST was obtained on 8/12/2020. Virtual visit is " scheduled today to review the test results.    Home sleep study:  Study Date: 8/12/2020    Data: A full night home sleep study was performed recording the standard physiologic parameters including body position, movement, sound, nasal pressure, thermal oral airflow, chest and abdominal movements with respiratory inductance plethysmography, and oxygen saturation by pulse oximetry. Pulse rate was estimated by oximetry recording. This study was considered adequate based on > 4 hours of quality oximetry and respiratory recording. As specified by the AASM Manual for the Scoring of Sleep and Associated events, version 2.3, Rule VIII.D 1B, 4% oxygen desaturation scoring for hypopneas is used as a standard of care on all home sleep apnea testing.     Analysis Time: 587.3 minutes     Respiration:   Sleep Associated Hypoxemia: sustained hypoxemia was not present. Baseline oxygen saturation was 96.1 %.  Time with saturation less than or equal to 88% was 1.0 minutes. The lowest oxygen saturation was 86 %.   Snoring: Snoring was present.  Respiratory events: The home study revealed a presence of 155 obstructive apneas and 37 mixed and central apneas. There were 11 hypopneas resulting in a combined apnea/hypopnea index [AHI] of 20.7 events per hour.  AHI was 30.4 per hour supine, n/a per hour prone, 16.1 per hour on left side, and 2.9 per hour on right side.   Pattern: Excluding events noted above, respiratory rate and pattern was Normal.     Position: Percent of time spent: supine -57.9 %, prone - 0%, on left -13.9 %, on right -27.8 %.     Heart Rate: By pulse oximetry normal rate was noted.      Assessment:     Moderate obstructive sleep apnea, pronounced during supine sleep position.    Sleep associated hypoxemia was not present.     Recommendations:    Consider a) auto CPAP with pressure settings 5-15 cmH2O or b)combination of oral appliance through referral to dentistry along with positional therapy during sleep.    Suggest  optimizing sleep hygiene and avoiding sleep deprivation.    Weight management.    Test results were discussed in detail.    Current meds:  Current Outpatient Medications   Medication Sig Dispense Refill     acetaminophen-caffeine (EXCEDRIN TENSION HEADACHE) 500-65 MG TABS Take 2 tablets by mouth every 6 hours as needed for mild pain       clonazePAM (KLONOPIN) 0.5 MG tablet TK 1 T PO D PRA  1     sertraline (ZOLOFT) 100 MG tablet Take 1 tablet (100 mg) by mouth daily 90 tablet 3     tamoxifen 20 MG PO tablet Take 1 tablet (20 mg) by mouth daily 90 tablet 3     Problem list:  Patient Active Problem List   Diagnosis     Contraception     Anxiety     CARDIOVASCULAR SCREENING; LDL GOAL LESS THAN 160     Onychomycosis     Finger pain, right     Plantar warts     Pulmonary nodules     Mitral valve prolapse     Atypical lobular hyperplasia (ALH) of left breast     Obstructive sleep apnea syndrome     Breast neoplasm, Tis (LCIS)     Past medical history:  Past Medical History:   Diagnosis Date     Anxiety     sees psychiatrist     Arthritis      Heart murmur      Migraine, unspecified, without mention of intractable migraine without mention of status migrainosus      Sleep apnea      Sleep disturbance, unspecified     hx sleep apnea     Viral warts, unspecified      Past surgical history:  Past Surgical History:   Procedure Laterality Date     BIOPSY BREAST SEED LOCALIZATION Left 1/24/2020    Procedure: LEFT BREAST SEED LOCALIZED EXCISIONAL BIOPSY;  Surgeon: Cristi Arndt MD;  Location:  OR     COLONOSCOPY Left 12/18/2017    Procedure: COLONOSCOPY;  Colonoscopy; difficulty in advancing scope (tight corner) so used biopsy forcep to follow/ advance scope;  Surgeon: Babar Gramajo MD;  Location:  GI     TONSILLECTOMY       Allergies:  Allergies   Allergen Reactions     Penicillins      PN: LW Reaction: rash     Social history:  Social History     Tobacco Use     Smoking status: Never Smoker     Smokeless tobacco:  "Never Used   Substance Use Topics     Alcohol use: No     Drug use: No     Family history:  Family History   Problem Relation Age of Onset     Hypertension Mother      Cancer Mother 70        nonHodgkins lymphoma     Psychotic Disorder Mother         anxiety     Alzheimer Disease Father      Breast Cancer Sister         breast ca age 38     Gastrointestinal Disease Brother         crohns or colitis     No Known Problems Daughter      Colon Cancer No family hx of      Exam:  Ht 1.676 m (5' 6\")   Wt 61.2 kg (135 lb)   BMI 21.79 kg/m    GENERAL APPEARANCE: in no distress  PSYCH: Alert and oriented times 3; coherent speech, normal   rate and volume, able to articulate logical thoughts, able   to abstract reason, no tangential thoughts, no hallucinations   or delusions  Her affect is normal  RESP: No cough, no audible wheezing, able to talk in full sentences    Assessment/Plan:  Moderate obstructive sleep apnea, pronounced during supine sleep position without sleep associated hypoxemia.  We discussed the options of auto CPAP with pressure settings 5-15 cmH2O or b)combination of oral appliance through referral to dentistry along with positional therapy during sleep. She is interested in the  latter option. She will f/u with her dentist about the oral appliance.  Options to help with positional therapy during sleep discussed.    She  was instructed to call our clinic at [phone] after the dental appliance is adequately adjusted to schedule a repeat home sleep study which will be done while she is using the positional device of her choice along with the dental appliance to check for the effectiveness of the combination  treatment for JUAN ALBERTO.    Follow-up will be scheduled in 10-14 days after she completes the sleep study to review the test results.     Encouraged healthy diet and regular exercise.    Patient was strongly advised to avoid driving, operating any heavy machinery or other hazardous situations while drowsy or " sleepy.  Patient was counseled on the importance of driving while alert, to pull over if drowsy, or nap before getting into the vehicle if sleepy.     The above note was dictated using voice recognition software. Although reviewed after completion, some word and grammatical error may remain . Please contact the author for any clarifications.    Tyrone Gusman MD  [address] [id number] [phone]  Dept: [phone]

## 2020-08-19 NOTE — PATIENT INSTRUCTIONS
Your BMI is Body mass index is 21.79 kg/m .  Weight management is a personal decision.  If you are interested in exploring weight loss strategies, the following discussion covers the approaches that may be successful. Body mass index (BMI) is one way to tell whether you are at a healthy weight, overweight, or obese. It measures your weight in relation to your height.  A BMI of 18.5 to 24.9 is in the healthy range. A person with a BMI of 25 to 29.9 is considered overweight, and someone with a BMI of 30 or greater is considered obese. More than two-thirds of American adults are considered overweight or obese.  Being overweight or obese increases the risk for further weight gain. Excess weight may lead to heart disease and diabetes.  Creating and following plans for healthy eating and physical activity may help you improve your health.  Weight control is part of healthy lifestyle and includes exercise, emotional health, and healthy eating habits. Careful eating habits lifelong are the mainstay of weight control. Though there are significant health benefits from weight loss, long-term weight loss with diet alone may be very difficult to achieve- studies show long-term success with dietary management in less than 10% of people. Attaining a healthy weight may be especially difficult to achieve in those with severe obesity. In some cases, medications, devices and surgical management might be considered.  What can you do?  If you are overweight or obese and are interested in methods for weight loss, you should discuss this with your provider.     Consider reducing daily calorie intake by 500 calories.     Keep a food journal.     Avoiding skipping meals, consider cutting portions instead.    Diet combined with exercise helps maintain muscle while optimizing fat loss. Strength training is particularly important for building and maintaining muscle mass. Exercise helps reduce stress, increase energy, and improves fitness.  Increasing exercise without diet control, however, may not burn enough calories to loose weight.       Start walking three days a week 10-20 minutes at a time    Work towards walking thirty minutes five days a week     Eventually, increase the speed of your walking for 1-2 minutes at time    In addition, we recommend that you review healthy lifestyles and methods for weight loss available through the National Institutes of Health patient information sites:  http://win.niddk.nih.gov/publications/index.htm    And look into health and wellness programs that may be available through your health insurance provider, employer, local community center, or eloy club.    You have expressed interest in combination of positional therapy during sleep and oral appliance for sleep apnea.  Please follow up with your dentist regarding the oral appliance.  Here are some of the options for positional therapy during sleep:  FDA approved Zzoma pillow. this requires prescription. I have provided you with a prescription for this pillow, if you are interested , through [email or web address].  Slumber bump( does not need a prescription can be purchased through online resources including [employer].com)  Sleep noodle( does not need a prescription can be purchased through online resources including [employer])  Tennis ball t shirt(cost effective)  FOLLOW UP PLAN: Please  call our clinic at [phone] after the dental appliance is adequately adjusted to schedule a repeat home sleep study which will be done while she you are using the positional device of your choice along with the dental appliance to check for the effectiveness of the combination  treatment for JUAN ALBERTO.    Follow-up will be scheduled in 10-14 days after you complete the sleep study to review the test results.

## 2020-08-19 NOTE — PROCEDURES
"HOME SLEEP STUDY INTERPRETATION    Patient: Brii Taylor  MRN: [id number]  YOB: 1965  Study Date: 8/12/2020  Referring Provider: Ct Arauz  Ordering Provider: Tyrone Gusman MD      Indications for Home Study: Brii Taylor is a 54 year old female with a history of previously diagnosed JUAN ALBERTO, who  needs an updated sleep study prior to her dentist ordering an oral appliance.     Estimated body mass index is 22.27 kg/m  as calculated from the following:    Height as of 7/21/20: 1.676 m (5' 6\").    Weight as of 7/21/20: 62.6 kg (138 lb).   [place] Sleepiness Scale: 9/24(reported during the sleep clinic visit on 7/22/2020)       Data: A full night home sleep study was performed recording the standard physiologic parameters including body position, movement, sound, nasal pressure, thermal oral airflow, chest and abdominal movements with respiratory inductance plethysmography, and oxygen saturation by pulse oximetry. Pulse rate was estimated by oximetry recording. This study was considered adequate based on > 4 hours of quality oximetry and respiratory recording. As specified by the AASM Manual for the Scoring of Sleep and Associated events, version 2.3, Rule VIII.D 1B, 4% oxygen desaturation scoring for hypopneas is used as a standard of care on all home sleep apnea testing.    Analysis Time: 587.3 minutes    Respiration:   Sleep Associated Hypoxemia: sustained hypoxemia was not present. Baseline oxygen saturation was 96.1 %.  Time with saturation less than or equal to 88% was 1.0 minutes. The lowest oxygen saturation was 86 %.   Snoring: Snoring was present.  Respiratory events: The home study revealed a presence of 155 obstructive apneas and 37 mixed and central apneas. There were 11 hypopneas resulting in a combined apnea/hypopnea index [AHI] of 20.7 events per hour.  AHI was 30.4 per hour supine, n/a per hour prone, 16.1 per hour on left side, and 2.9 per hour on " right side.   Pattern: Excluding events noted above, respiratory rate and pattern was Normal.    Position: Percent of time spent: supine -57.9 %, prone - 0%, on left -13.9 %, on right -27.8 %.    Heart Rate: By pulse oximetry normal rate was noted.     Assessment:     Moderate obstructive sleep apnea, pronounced during supine sleep position.    Sleep associated hypoxemia was not present.    Recommendations:    Consider a) auto CPAP with pressure settings 5-15 cmH2O or b)combination of oral appliance through referral to dentistry along with positional therapy during sleep.    Suggest optimizing sleep hygiene and avoiding sleep deprivation.    Weight management.    Diagnosis Code(s): Obstructive Sleep Apnea G47.33, Snoring R06.83    Tyrone Gusman MD, August 19, 2020   Diplomate, American Board of Internal Medicine, Sleep Medicine

## 2020-11-19 DIAGNOSIS — F41.9 ANXIETY: ICD-10-CM

## 2020-11-23 RX ORDER — SERTRALINE HYDROCHLORIDE 100 MG/1
100 TABLET, FILM COATED ORAL DAILY
Qty: 90 TABLET | Refills: 0 | Status: SHIPPED | OUTPATIENT
Start: 2020-11-23 | End: 2021-02-23

## 2020-12-28 ENCOUNTER — HOSPITAL ENCOUNTER (OUTPATIENT)
Dept: MAMMOGRAPHY | Facility: CLINIC | Age: 55
Discharge: HOME | End: 2020-12-28
Attending: INTERNAL MEDICINE | Admitting: RADIOLOGY
Payer: COMMERCIAL

## 2020-12-28 DIAGNOSIS — D05.02 NEOPLASM OF LEFT BREAST, PRIMARY TUMOR STAGING CATEGORY TIS: LOBULAR CARCINOMA IN SITU (LCIS): ICD-10-CM

## 2020-12-28 DIAGNOSIS — Z80.3 FHX: BREAST CANCER IN FIRST DEGREE RELATIVE: ICD-10-CM

## 2020-12-28 PROCEDURE — 77067 SCR MAMMO BI INCL CAD: CPT

## 2021-01-09 ENCOUNTER — HEALTH MAINTENANCE LETTER (OUTPATIENT)
Age: 56
End: 2021-01-09

## 2021-01-19 ENCOUNTER — VIRTUAL VISIT (OUTPATIENT)
Dept: ONCOLOGY | Facility: CLINIC | Age: 56
End: 2021-01-19
Attending: INTERNAL MEDICINE
Payer: COMMERCIAL

## 2021-01-19 DIAGNOSIS — Z80.3 FHX: BREAST CANCER IN FIRST DEGREE RELATIVE: Primary | ICD-10-CM

## 2021-01-19 DIAGNOSIS — D05.02 NEOPLASM OF LEFT BREAST, PRIMARY TUMOR STAGING CATEGORY TIS: LOBULAR CARCINOMA IN SITU (LCIS): ICD-10-CM

## 2021-01-19 DIAGNOSIS — D05.00 NEOPLASM OF BREAST, PRIMARY TUMOR STAGING CATEGORY TIS: LOBULAR CARCINOMA IN SITU (LCIS), UNSPECIFIED LATERALITY: ICD-10-CM

## 2021-01-19 PROCEDURE — 99214 OFFICE O/P EST MOD 30 MIN: CPT | Mod: 95 | Performed by: INTERNAL MEDICINE

## 2021-01-19 PROCEDURE — 999N001193 HC VIDEO/TELEPHONE VISIT; NO CHARGE

## 2021-01-19 RX ORDER — ANASTROZOLE 1 MG/1
1 TABLET ORAL DAILY
Qty: 90 TABLET | Refills: 3 | Status: SHIPPED | OUTPATIENT
Start: 2021-01-19 | End: 2021-07-21 | Stop reason: ALTCHOICE

## 2021-01-19 NOTE — PROGRESS NOTES
Brii is a 55 year old who is being evaluated via a billable video visit.      How would you like to obtain your AVS? MWHShart  If the video visit is dropped, the invitation should be resent by: Other e-mail: [email or web address]  Will anyone else be joining your video visit? Venus Hughes CMA on 1/19/2021 at 3:36 PM

## 2021-01-19 NOTE — LETTER
1/19/2021         RE: Brii Taylor  [address] [address]        Dear Colleague,    Thank you for referring your patient, Brii Taylor, to the [facility]. Please see a copy of my visit note below.    Brii is a 55 year old who is being evaluated via a billable video visit.      How would you like to obtain your AVS? MyChart  If the video visit is dropped, the invitation should be resent by: Other e-mail: MyChart  Will anyone else be joining your video visit? Venus Hughes, CIRA on 1/19/2021 at 3:36 PM          Visit Date:   01/20/2021      Brii Taylor is a 55-year-old patient who is being evaluated today by a video visit due to public health emergency with coronavirus.      PATIENT LOCATION:  Home.      PHYSICIAN LOCATION:  [facility].      VIDEO PLATFORM:  [employer].      COMPLEXITY OF CASE:  Moderate.      CHIEF COMPLAINT:     1.  LCIS of the left breast.   2.  Family history of breast cancer in first-degree relative.      HISTORY OF PRESENTING COMPLAINT:  Brii is 55 years old.  She has a diagnosis of LCIS and atypical ductal hyperplasia as well as some flat epithelial atypia in the upper outer quadrant of the left breast.  She also has a family history of breast cancer.  Her sister had breast cancer at age 38 and then again at age 50.  Her mother had non-Hodgkin's lymphoma.  The patient's sister had genetic testing done, which was negative.  Because of her increased risk of breast cancer, we are doing an MRI scan and a mammogram every 6 months.  Her next MRI scan will be due in 06/2021.  Her last mammogram, I have reviewed with her today, was done on 12/28/2020, and that was normal.  She does have heterogeneously dense breast tissue, which may miss a small tumor, hence the need to continue on the MRI scans.  She is currently on tamoxifen for prevention, and she is doing actually quite well on it.  She does get some side  effects, but it is more or less tolerable.  She denies today cough, shortness of breath, bone pain, any worrisome symptomatology from my standpoint.  She has tried some vitamin E for hot flashes, which works somewhat, but not fully.  She is on Zoloft 100 mg, as well as Klonopin as needed.  I have not added anything else for her hot flashes.  Because of her side effects with tamoxifen, we have discussed switching her over to an aromatase inhibitor, because she is postmenopausal.  She has not had a menstrual period now for over a year.  We have discussed aromatase inhibitors at length today.  We discussed the risks, benefits and side effects, and I also want to get a bone density for baseline.  The patient is in agreement with the plan.      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  She is a nonsmoker.  She has 2 children.      PAST SURGICAL HISTORY:  History of left-sided excisional biopsy of the breast, history of tonsillectomy.      REVIEW OF SYSTEMS:  A 12-point comprehensive review of systems has been done with her and is as outlined above.      PAIN ASSESSMENT:  She is in no pain today.      PAST MEDICAL HISTORY:  History of LCIS, history of atypical ductal hyperplasia, history of migraines.      PHYSICAL EXAMINATION:   GENERAL:  Overall, she looks good.   EYES:  Have no redness or discharge.   RESPIRATORY:  There is no cough or labored breathing.   MUSCULOSKELETAL:  She is moving all extremities.   NEUROLOGICAL:  She is alert and oriented x 3.   SKIN:  She has no rashes or lesions.   The rest of her comprehensive physical exam has been deferred due to public health emergency and video visit restrictions.      DATA REVIEW:  I have reviewed her last mammogram and written her up for her next MRI scan.  I have switched her from tamoxifen over to Arimidex this visit.  We will also get a bone density.      IMPRESSION AND PLAN:  This is a 55-year-old now postmenopausal patient who  has been on tamoxifen for cancer prevention.  She has got lobular carcinoma in situ, atypical ductal hyperplasia and a positive family history.  Because of her side effects with the tamoxifen and the fact she is now postmenopausal.  I have switched her over to Arimidex.  We will see if her menopausal symptoms are better on the Arimidex.  She is to call me if there are any problems.  I will review her bone density when it comes in.  She will be due for her MRI scan in 2021.  All of the above has been discussed with her.         BRENDA BARNES MD             D: 2021   T: 2021   MT: CHEVY      Name:     VIGNESH MCGILL   MRN:      [id number]        Account:      [id number]   :      1965           Visit Date:   2021      Document: [id number]        Again, thank you for allowing me to participate in the care of your patient.        Sincerely,        Brenda Barnes MD

## 2021-01-19 NOTE — LETTER
1/19/2021         RE: Brii Taylor  [address] [address]        Dear Colleague,    Thank you for referring your patient, Brii Taylor, to the [facility]. Please see a copy of my visit note below.    Brii is a 55 year old who is being evaluated via a billable video visit.      How would you like to obtain your AVS? MyChart  If the video visit is dropped, the invitation should be resent by: Other e-mail: MyChart  Will anyone else be joining your video visit? Venus Hughes, CIRA on 1/19/2021 at 3:36 PM          Visit Date:   01/20/2021      Brii Taylor is a 55-year-old patient who is being evaluated today by a video visit due to public health emergency with coronavirus.      PATIENT LOCATION:  Home.      PHYSICIAN LOCATION:  [facility].      VIDEO PLATFORM:  [employer].      COMPLEXITY OF CASE:  Moderate.      CHIEF COMPLAINT:     1.  LCIS of the left breast.   2.  Family history of breast cancer in first-degree relative.      HISTORY OF PRESENTING COMPLAINT:  Brii is 55 years old.  She has a diagnosis of LCIS and atypical ductal hyperplasia as well as some flat epithelial atypia in the upper outer quadrant of the left breast.  She also has a family history of breast cancer.  Her sister had breast cancer at age 38 and then again at age 50.  Her mother had non-Hodgkin's lymphoma.  The patient's sister had genetic testing done, which was negative.  Because of her increased risk of breast cancer, we are doing an MRI scan and a mammogram every 6 months.  Her next MRI scan will be due in 06/2021.  Her last mammogram, I have reviewed with her today, was done on 12/28/2020, and that was normal.  She does have heterogeneously dense breast tissue, which may miss a small tumor, hence the need to continue on the MRI scans.  She is currently on tamoxifen for prevention, and she is doing actually quite well on it.  She does get some side  effects, but it is more or less tolerable.  She denies today cough, shortness of breath, bone pain, any worrisome symptomatology from my standpoint.  She has tried some vitamin E for hot flashes, which works somewhat, but not fully.  She is on Zoloft 100 mg, as well as Klonopin as needed.  I have not added anything else for her hot flashes.  Because of her side effects with tamoxifen, we have discussed switching her over to an aromatase inhibitor, because she is postmenopausal.  She has not had a menstrual period now for over a year.  We have discussed aromatase inhibitors at length today.  We discussed the risks, benefits and side effects, and I also want to get a bone density for baseline.  The patient is in agreement with the plan.      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  She is a nonsmoker.  She has 2 children.      PAST SURGICAL HISTORY:  History of left-sided excisional biopsy of the breast, history of tonsillectomy.      REVIEW OF SYSTEMS:  A 12-point comprehensive review of systems has been done with her and is as outlined above.      PAIN ASSESSMENT:  She is in no pain today.      PAST MEDICAL HISTORY:  History of LCIS, history of atypical ductal hyperplasia, history of migraines.      PHYSICAL EXAMINATION:   GENERAL:  Overall, she looks good.   EYES:  Have no redness or discharge.   RESPIRATORY:  There is no cough or labored breathing.   MUSCULOSKELETAL:  She is moving all extremities.   NEUROLOGICAL:  She is alert and oriented x 3.   SKIN:  She has no rashes or lesions.   The rest of her comprehensive physical exam has been deferred due to public health emergency and video visit restrictions.      DATA REVIEW:  I have reviewed her last mammogram and written her up for her next MRI scan.  I have switched her from tamoxifen over to Arimidex this visit.  We will also get a bone density.      IMPRESSION AND PLAN:  This is a 55-year-old now postmenopausal patient who  has been on tamoxifen for cancer prevention.  She has got lobular carcinoma in situ, atypical ductal hyperplasia and a positive family history.  Because of her side effects with the tamoxifen and the fact she is now postmenopausal.  I have switched her over to Arimidex.  We will see if her menopausal symptoms are better on the Arimidex.  She is to call me if there are any problems.  I will review her bone density when it comes in.  She will be due for her MRI scan in 2021.  All of the above has been discussed with her.         BRENDA BARNES MD             D: 2021   T: 2021   MT: [place]VY      Name:     VIGNESH MCGILL   MRN:      [id number]        Account:      [id number]   :      1965           Visit Date:   2021      Document: [id number]        Again, thank you for allowing me to participate in the care of your patient.        Sincerely,        Brenda Barnes MD

## 2021-01-20 NOTE — PATIENT INSTRUCTIONS
Left msg for Brii with date of future appt w/Dr Barnes 7/21/21.  Also asked her to either call us back to schedule MR breast & bone density after 6/25/21 or gave her phone # for radiology to schedule these herself. stevie

## 2021-01-21 ENCOUNTER — MYC MEDICAL ADVICE (OUTPATIENT)
Dept: ONCOLOGY | Facility: CLINIC | Age: 56
End: 2021-01-21

## 2021-01-21 DIAGNOSIS — F41.9 ANXIETY: ICD-10-CM

## 2021-01-21 NOTE — PROGRESS NOTES
Visit Date:   01/20/2021      Brii Taylor is a 55-year-old patient who is being evaluated today by a video visit due to public health emergency with coronavirus.      PATIENT LOCATION:  Home.      PHYSICIAN LOCATION:  [facility].      VIDEO PLATFORM:  [employer].      COMPLEXITY OF CASE:  Moderate.      CHIEF COMPLAINT:     1.  LCIS of the left breast.   2.  Family history of breast cancer in first-degree relative.      HISTORY OF PRESENTING COMPLAINT:  Brii is 55 years old.  She has a diagnosis of LCIS and atypical ductal hyperplasia as well as some flat epithelial atypia in the upper outer quadrant of the left breast.  She also has a family history of breast cancer.  Her sister had breast cancer at age 38 and then again at age 50.  Her mother had non-Hodgkin's lymphoma.  The patient's sister had genetic testing done, which was negative.  Because of her increased risk of breast cancer, we are doing an MRI scan and a mammogram every 6 months.  Her next MRI scan will be due in 06/2021.  Her last mammogram, I have reviewed with her today, was done on 12/28/2020, and that was normal.  She does have heterogeneously dense breast tissue, which may miss a small tumor, hence the need to continue on the MRI scans.  She is currently on tamoxifen for prevention, and she is doing actually quite well on it.  She does get some side effects, but it is more or less tolerable.  She denies today cough, shortness of breath, bone pain, any worrisome symptomatology from my standpoint.  She has tried some vitamin E for hot flashes, which works somewhat, but not fully.  She is on Zoloft 100 mg, as well as Klonopin as needed.  I have not added anything else for her hot flashes.  Because of her side effects with tamoxifen, we have discussed switching her over to an aromatase inhibitor, because she is postmenopausal.  She has not had a menstrual period now for over a year.  We have discussed aromatase inhibitors at length  today.  We discussed the risks, benefits and side effects, and I also want to get a bone density for baseline.  The patient is in agreement with the plan.      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  She is a nonsmoker.  She has 2 children.      PAST SURGICAL HISTORY:  History of left-sided excisional biopsy of the breast, history of tonsillectomy.      REVIEW OF SYSTEMS:  A 12-point comprehensive review of systems has been done with her and is as outlined above.      PAIN ASSESSMENT:  She is in no pain today.      PAST MEDICAL HISTORY:  History of LCIS, history of atypical ductal hyperplasia, history of migraines.      PHYSICAL EXAMINATION:   GENERAL:  Overall, she looks good.   EYES:  Have no redness or discharge.   RESPIRATORY:  There is no cough or labored breathing.   MUSCULOSKELETAL:  She is moving all extremities.   NEUROLOGICAL:  She is alert and oriented x 3.   SKIN:  She has no rashes or lesions.   The rest of her comprehensive physical exam has been deferred due to public health emergency and video visit restrictions.      DATA REVIEW:  I have reviewed her last mammogram and written her up for her next MRI scan.  I have switched her from tamoxifen over to Arimidex this visit.  We will also get a bone density.      IMPRESSION AND PLAN:  This is a 55-year-old now postmenopausal patient who has been on tamoxifen for cancer prevention.  She has got lobular carcinoma in situ, atypical ductal hyperplasia and a positive family history.  Because of her side effects with the tamoxifen and the fact she is now postmenopausal.  I have switched her over to Arimidex.  We will see if her menopausal symptoms are better on the Arimidex.  She is to call me if there are any problems.  I will review her bone density when it comes in.  She will be due for her MRI scan in 06/2021.  All of the above has been discussed with her.         BRENDA SWANSON MD             D: 01/20/2021    T: 2021   MT: [place]VY      Name:     VIGNESH MCGILL   MRN:      [id number]        Account:      [id number]   :      1965           Visit Date:   2021      Document: [id number]

## 2021-02-23 RX ORDER — SERTRALINE HYDROCHLORIDE 100 MG/1
100 TABLET, FILM COATED ORAL DAILY
Qty: 90 TABLET | Refills: 0 | Status: SHIPPED | OUTPATIENT
Start: 2021-02-23 | End: 2021-06-02

## 2021-03-15 ENCOUNTER — OFFICE VISIT (OUTPATIENT)
Dept: URGENT CARE | Facility: URGENT CARE | Age: 56
End: 2021-03-15
Payer: COMMERCIAL

## 2021-03-15 VITALS
HEART RATE: 72 BPM | WEIGHT: 140 LBS | SYSTOLIC BLOOD PRESSURE: 110 MMHG | BODY MASS INDEX: 22.6 KG/M2 | OXYGEN SATURATION: 97 % | TEMPERATURE: 98.2 F | DIASTOLIC BLOOD PRESSURE: 70 MMHG

## 2021-03-15 DIAGNOSIS — R10.2 HYPOGASTRIC PAIN: ICD-10-CM

## 2021-03-15 DIAGNOSIS — R30.0 DYSURIA: Primary | ICD-10-CM

## 2021-03-15 LAB
ALBUMIN UR-MCNC: NEGATIVE MG/DL
APPEARANCE UR: CLEAR
BILIRUB UR QL STRIP: NEGATIVE
COLOR UR AUTO: YELLOW
GLUCOSE UR STRIP-MCNC: NEGATIVE MG/DL
HGB UR QL STRIP: NEGATIVE
KETONES UR STRIP-MCNC: NEGATIVE MG/DL
LEUKOCYTE ESTERASE UR QL STRIP: NEGATIVE
NITRATE UR QL: NEGATIVE
PH UR STRIP: 7 PH (ref 5–7)
SOURCE: NORMAL
SP GR UR STRIP: >1.03 (ref 1–1.03)
UROBILINOGEN UR STRIP-ACNC: 0.2 EU/DL (ref 0.2–1)

## 2021-03-15 PROCEDURE — 81003 URINALYSIS AUTO W/O SCOPE: CPT | Performed by: FAMILY MEDICINE

## 2021-03-15 PROCEDURE — 99213 OFFICE O/P EST LOW 20 MIN: CPT | Performed by: FAMILY MEDICINE

## 2021-03-15 NOTE — PROGRESS NOTES
SUBJECTIVE:  Brii Taylor, a 55 year old female scheduled an appointment to discuss the following issues:  Dysuria  Hypogastric pain  Medical, social, surgical, and family histories reviewed.     Urgent Care   Possible UTI   Pt reports she has had some hypogastric tenderness especially when she bumps her suprapubic area against the counter in the last 1.5 weeks.  She also has some urinary urgency but no dysuria or hematuria or frequency.    Pt also has some nausea along with this.  No vaginal discharge or bleeding, no STD concerns.  Pt has been menopausal.  She has been taking Tamoxifen for prevention of breast cancer since Jan 2020; in the last 2 months, this has been switched to Arimidex.  No diarrhea/constipation.    Pt is here because she wants to make sure it is not a UTI.    ROS:  See HPI.  No nausea/vomiting.  No fever/chills.  No chest pain/SOB.  No BM/urine problems.  No dizziness or syncope.      OBJECTIVE:  /70   Pulse 72   Temp 98.2  F (36.8  C) (Oral)   Wt 63.5 kg (140 lb)   SpO2 97%   BMI 22.60 kg/m    EXAM:  GENERAL APPEARANCE: alert and no distress; afebrile; not septic  EYES: Eyes grossly normal to inspection, PERRL and conjunctivae and sclerae normal  HENT: ear canals and TM's normal and nose and mouth without ulcers or lesions  NECK: no adenopathy, no asymmetry, masses, or scars and thyroid normal to palpation  RESP: lungs clear to auscultation - no rales, rhonchi or wheezes  CV: regular rates and rhythm, normal S1 S2, no S3 or S4 and no murmur, click or rub  LYMPHATICS: no cervical adenopathy  ABDOMEN: soft, mild tenderness suprapubic region, no mass or lesions palpable or noted, without hepatosplenomegaly or masses and bowel sounds normal; no CVA tenderness; negative Barnes's and McBurney's  MS: extremities normal- no gross deformities noted  SKIN: no suspicious lesions or rashes  NEURO: Normal strength and tone, mentation intact and speech normal      ASSESSMENT/PLAN:  (R30.0)  Dysuria  (primary encounter diagnosis)  Comment: urinalysis normal; hypogastric tenderness could be pain and asthenia related to Tamoxifen and Arimidex; it could also be due to endometrial changes or fibroid or other uterine changes; I offered vaginal swabs to rule out BV/yeast but pt declined.  She will follow-up with her PCP and oncologist regarding this.  Plan: *UA reflex to Microscopic and Culture ([place]         and [facility] (except [place] and         [place])    Pt to f/up PCP in 1-2 weeks if no improvement or worsening.  Warning signs and symptoms explained.

## 2021-03-15 NOTE — PATIENT INSTRUCTIONS
Patient Education     Text SUPPORT1 to 82370 to learn if you may be eligible for financial support with your medication(s).    Msg & Data Rates May Apply. Msg freq varies. Terms apply. Text HELP for help. Text STOP to end.   Tamoxifen oral tablet  Brand Name: Nolvadex  What is this medicine?  TAMOXIFEN (ta MOX i fen) blocks the effects of estrogen. It is commonly used to treat breast cancer. It is also used to decrease the chance of breast cancer coming back in women who have received treatment for the disease. It may also help prevent breast cancer in women who have a high risk of developing breast cancer.  How should I use this medicine?  Take this medicine by mouth with a glass of water. Follow the directions on the prescription label. You can take it with or without food. Take your medicine at regular intervals. Do not take your medicine more often than directed. Do not stop taking except on your doctor's advice.  A special MedGuide will be given to you by the pharmacist with each prescription and refill. Be sure to read this information carefully each time.  Talk to your pediatrician regarding the use of this medicine in children. While this drug may be prescribed for selected conditions, precautions do apply.  What side effects may I notice from receiving this medicine?  Side effects that you should report to your doctor or health care professional as soon as possible:    allergic reactions like skin rash, itching or hives, swelling of the face, lips, or tongue    changes in vision    changes in your menstrual cycle    difficulty walking or talking    new breast lumps    numbness    pelvic pain or pressure    redness, blistering, peeling or loosening of the skin, including inside the mouth    signs and symptoms of a dangerous change in heartbeat or heart rhythm like chest pain, dizziness, fast or irregular heartbeat, palpitations, feeling faint or lightheaded, falls, breathing problems    sudden chest  pain    swelling, pain or tenderness in your calf or leg    unusual bruising or bleeding    vaginal discharge that is bloody, brown, or rust    weakness    yellowing of the whites of the eyes or skin  Side effects that usually do not require medical attention (report to your doctor or health care professional if they continue or are bothersome):    fatigue    hair loss, although uncommon and is usually mild    headache    hot flashes    impotence (in men)    nausea, vomiting (mild)    vaginal discharge (white or clear)  What may interact with this medicine?  Do not take this medicine with any of the following medications:    cisapride    certain medicines for irregular heart beat like dofetilide, dronedarone, quinidine    certain medicines for fungal infection like fluconazole, posaconazole    pimozide    saquinavir    thioridazine  This medicine may also interact with the following medications:    aminoglutethimide    anastrozole    bromocriptine    chemotherapy drugs    female hormones, like estrogens and birth control pills    letrozole    medroxyprogesterone    phenobarbital    rifampin    warfarin  What if I miss a dose?  If you miss a dose, take it as soon as you can. If it is almost time for your next dose, take only that dose. Do not take double or extra doses.  Where should I keep my medicine?  Keep out of the reach of children.  Store at room temperature between 20 and 25 degrees C (68 and 77 degrees F). Protect from light. Keep container tightly closed. Throw away any unused medicine after the expiration date.  What should I tell my health care provider before I take this medicine?  They need to know if you have any of these conditions:    blood clots    blood disease    cataracts or impaired eyesight    endometriosis    high calcium levels    high cholesterol    irregular menstrual cycles    liver disease    stroke    uterine fibroids    an unusual reaction to tamoxifen, other medicines, foods, dyes, or  preservatives    pregnant or trying to get pregnant    breast-feeding  What should I watch for while using this medicine?  Visit your doctor or health care professional for regular checks on your progress. You will need regular pelvic exams, breast exams, and mammograms. If you are taking this medicine to reduce your risk of getting breast cancer, you should know that this medicine does not prevent all types of breast cancer. If breast cancer or other problems occur, there is no guarantee that it will be found at an early stage.  Do not become pregnant while taking this medicine or for 2 months after stopping it. Women should inform their doctor if they wish to become pregnant or think they might be pregnant. There is a potential for serious side effects to an unborn child. Talk to your health care professional or pharmacist for more information. Do not breast-feed an infant while taking this medicine or for 3 months after stopping it.  This medicine may interfere with the ability to have a child. Talk with your doctor or health care professional if you are concerned about your fertility.  NOTE:This sheet is a summary. It may not cover all possible information. If you have questions about this medicine, talk to your doctor, pharmacist, or health care provider. Copyright  2020 [employer]           Patient Education     Arimidex  Generic Name: Anastrazole  Drug type  Arimidex reduces the amount of estrogen made in the body. It works to stop and get rid of the cancer.  What this drug is used for  Breast cancer or   How this drug is given  This is a tablet taken by mouth 1 time each day. You can take it with or without food. The size of your dose depends on many things. Your doctor will decide on the best dose for you.  Your dose:(date)  If you miss a dose, take it as soon as you think about it. If it is close to the time for your next dose, skip it and take the dose at your normal time. Do not take two doses at the same  time. If you miss two doses, call the clinic to find out what to do.  Make sure your health care team knows about all the medicines and supplements you take. This will prevent drug interactions.  Side effects  Most people do not have all the side effects listed. Side effects will usually go away after the treatment is complete.  Common side effects  (occur in more than 3 out of 10 people):    Hot flashes, flushing.  Less common side effects  (occur in less than 3 out of 10 people):    Mood changes, feeling sad    Nausea (feel sick to your stomach) and vomiting (throwing up)    Tiredness, weakness    Pain in bones or joints.  Other information:  If you have any side effects, call us. We can help you manage these problems.  For more information, see:  www.chemocare.com  www.nlm.nih.gov/medlineplus/druginformation.htm  www.cancer.gov/cancertopics/coping/chemotherapy-and-you  For informational purposes only. Not to replace the advice of your health care provider.  Developed in collaboration with [facility].  Copyright   2013 [employer]. All rights reserved. [employer] 297205 - REV 02/16.  For informational purposes only. Not to replace the advice of your health care provider.  Copyright   2018 [employer]. All rights reserved.

## 2021-06-02 DIAGNOSIS — F41.9 ANXIETY: ICD-10-CM

## 2021-06-02 RX ORDER — SERTRALINE HYDROCHLORIDE 100 MG/1
100 TABLET, FILM COATED ORAL DAILY
Qty: 90 TABLET | Refills: 1 | Status: SHIPPED | OUTPATIENT
Start: 2021-06-02 | End: 2022-02-15

## 2021-06-02 NOTE — TELEPHONE ENCOUNTER
Pending Prescriptions:                       Disp   Refills    sertraline (ZOLOFT) 100 MG tablet         90 tab*1            Sig: Take 1 tablet (100 mg) by mouth daily          Last Written Prescription Date:  2/23/2021  Last Fill Quantity: 90,   # refills: 0  Last Office Visit: 1/19/2021  Future Office visit:    Next 5 appointments (look out 90 days)    Jul 21, 2021  2:30 PM  Return Visit with Aubrie Barnes MD  [facility] ([facility] [address]  [address]  [facility] [address]  [phone]           Routing refill request to provider for review/approval.  Raeann Shankar, RN, BSN, OCN, CBCN

## 2021-06-02 NOTE — TELEPHONE ENCOUNTER
Signed Prescriptions:                        Disp   Refills    sertraline (ZOLOFT) 100 MG tablet          90 tab*1        Sig: Take 1 tablet (100 mg) by mouth daily  Authorizing Provider: BRENDA BARNES    Rx has been approved by Dr. Barnes.  Raeann Shankar, RN, BSN, OCN, CBCN

## 2021-07-07 ENCOUNTER — ANCILLARY PROCEDURE (OUTPATIENT)
Dept: BONE DENSITY | Facility: CLINIC | Age: 56
End: 2021-07-07
Attending: INTERNAL MEDICINE
Payer: COMMERCIAL

## 2021-07-07 ENCOUNTER — HOSPITAL ENCOUNTER (OUTPATIENT)
Dept: MRI IMAGING | Facility: CLINIC | Age: 56
Discharge: HOME | End: 2021-07-07
Attending: INTERNAL MEDICINE | Admitting: INTERNAL MEDICINE
Payer: COMMERCIAL

## 2021-07-07 DIAGNOSIS — Z80.3 FHX: BREAST CANCER IN FIRST DEGREE RELATIVE: ICD-10-CM

## 2021-07-07 DIAGNOSIS — D05.02 NEOPLASM OF LEFT BREAST, PRIMARY TUMOR STAGING CATEGORY TIS: LOBULAR CARCINOMA IN SITU (LCIS): ICD-10-CM

## 2021-07-07 PROCEDURE — 77049 MRI BREAST C-+ W/CAD BI: CPT

## 2021-07-07 PROCEDURE — A9585 GADOBUTROL INJECTION: HCPCS | Performed by: INTERNAL MEDICINE

## 2021-07-07 PROCEDURE — 255N000002 HC RX 255 OP 636: Performed by: INTERNAL MEDICINE

## 2021-07-07 PROCEDURE — 77080 DXA BONE DENSITY AXIAL: CPT | Performed by: INTERNAL MEDICINE

## 2021-07-07 RX ORDER — GADOBUTROL 604.72 MG/ML
7.5 INJECTION INTRAVENOUS ONCE
Status: COMPLETED | OUTPATIENT
Start: 2021-07-07 | End: 2021-07-07

## 2021-07-07 RX ADMIN — GADOBUTROL 7 ML: 604.72 INJECTION INTRAVENOUS at 11:33

## 2021-07-21 ENCOUNTER — HOSPITAL ENCOUNTER (OUTPATIENT)
Facility: CLINIC | Age: 56
Discharge: HOME | End: 2021-07-21
Attending: INTERNAL MEDICINE | Admitting: INTERNAL MEDICINE
Payer: COMMERCIAL

## 2021-07-21 ENCOUNTER — ONCOLOGY VISIT (OUTPATIENT)
Dept: ONCOLOGY | Facility: CLINIC | Age: 56
End: 2021-07-21
Attending: INTERNAL MEDICINE
Payer: COMMERCIAL

## 2021-07-21 VITALS
BODY MASS INDEX: 22.92 KG/M2 | DIASTOLIC BLOOD PRESSURE: 69 MMHG | SYSTOLIC BLOOD PRESSURE: 103 MMHG | OXYGEN SATURATION: 96 % | HEART RATE: 66 BPM | RESPIRATION RATE: 16 BRPM | WEIGHT: 142 LBS | TEMPERATURE: 98.1 F

## 2021-07-21 DIAGNOSIS — D05.02 NEOPLASM OF LEFT BREAST, PRIMARY TUMOR STAGING CATEGORY TIS: LOBULAR CARCINOMA IN SITU (LCIS): ICD-10-CM

## 2021-07-21 DIAGNOSIS — Z80.3 FHX: BREAST CANCER IN FIRST DEGREE RELATIVE: Primary | ICD-10-CM

## 2021-07-21 DIAGNOSIS — Z80.3 FHX: BREAST CANCER IN FIRST DEGREE RELATIVE: ICD-10-CM

## 2021-07-21 LAB
ALBUMIN SERPL-MCNC: 4 G/DL (ref 3.4–5)
ALP SERPL-CCNC: 63 U/L (ref 40–150)
ALT SERPL W P-5'-P-CCNC: 20 U/L (ref 0–50)
ANION GAP SERPL CALCULATED.3IONS-SCNC: <1 MMOL/L (ref 3–14)
AST SERPL W P-5'-P-CCNC: 19 U/L (ref 0–45)
BILIRUB SERPL-MCNC: 0.4 MG/DL (ref 0.2–1.3)
BUN SERPL-MCNC: 16 MG/DL (ref 7–30)
CALCIUM SERPL-MCNC: 9.4 MG/DL (ref 8.5–10.1)
CHLORIDE BLD-SCNC: 108 MMOL/L (ref 94–109)
CO2 SERPL-SCNC: 31 MMOL/L (ref 20–32)
CREAT SERPL-MCNC: 0.71 MG/DL (ref 0.52–1.04)
ERYTHROCYTE [DISTWIDTH] IN BLOOD BY AUTOMATED COUNT: 12.8 % (ref 10–15)
GFR SERPL CREATININE-BSD FRML MDRD: >90 ML/MIN/1.73M2
GLUCOSE BLD-MCNC: 121 MG/DL (ref 70–99)
HCT VFR BLD AUTO: 40.2 % (ref 35–47)
HGB BLD-MCNC: 13.6 G/DL (ref 11.7–15.7)
MCH RBC QN AUTO: 30.4 PG (ref 26.5–33)
MCHC RBC AUTO-ENTMCNC: 33.8 G/DL (ref 31.5–36.5)
MCV RBC AUTO: 90 FL (ref 78–100)
PLATELET # BLD AUTO: 259 10E3/UL (ref 150–450)
POTASSIUM BLD-SCNC: 3.7 MMOL/L (ref 3.4–5.3)
PROT SERPL-MCNC: 7.1 G/DL (ref 6.8–8.8)
RBC # BLD AUTO: 4.47 10E6/UL (ref 3.8–5.2)
SODIUM SERPL-SCNC: 139 MMOL/L (ref 133–144)
WBC # BLD AUTO: 5.8 10E3/UL (ref 4–11)

## 2021-07-21 PROCEDURE — 80053 COMPREHEN METABOLIC PANEL: CPT

## 2021-07-21 PROCEDURE — 85027 COMPLETE CBC AUTOMATED: CPT

## 2021-07-21 PROCEDURE — 99214 OFFICE O/P EST MOD 30 MIN: CPT | Performed by: INTERNAL MEDICINE

## 2021-07-21 PROCEDURE — G0463 HOSPITAL OUTPT CLINIC VISIT: HCPCS

## 2021-07-21 PROCEDURE — 36415 COLL VENOUS BLD VENIPUNCTURE: CPT

## 2021-07-21 RX ORDER — TAMOXIFEN CITRATE 20 MG/1
20 TABLET ORAL DAILY
Qty: 90 TABLET | Refills: 3 | Status: SHIPPED | OUTPATIENT
Start: 2021-07-21 | End: 2022-01-26

## 2021-07-21 ASSESSMENT — PAIN SCALES - GENERAL: PAINLEVEL: SEVERE PAIN (6)

## 2021-07-21 NOTE — NURSING NOTE
"Oncology Rooming Note    July 21, 2021 2:41 PM   Brii Taylor is a 55 year old female who presents for:    Chief Complaint   Patient presents with     Oncology Clinic Visit     Breast neoplasm, Tis (LCIS)     Initial Vitals: /69   Pulse 66   Temp 98.1  F (36.7  C) (Tympanic)   Resp 16   Wt 64.4 kg (142 lb)   SpO2 96%   BMI 22.92 kg/m   Estimated body mass index is 22.92 kg/m  as calculated from the following:    Height as of 8/19/20: 1.676 m (5' 6\").    Weight as of this encounter: 64.4 kg (142 lb). Body surface area is 1.73 meters squared.  Severe Pain (6) Comment: Data Unavailable   No LMP recorded. Patient is premenopausal.  Allergies reviewed: Yes  Medications reviewed: Yes    Medications: Medication refills not needed today.  Pharmacy name entered into [employer]:    [id number] [place] [facility] [address] [id number] [place], [place] - [address]    Clinical concerns: f/u       Beverly Orta CMA              "

## 2021-07-21 NOTE — LETTER
7/21/2021         RE: Brii Taylor  [address] [address] [facility] [address]        Dear Colleague,    Thank you for referring your patient, Brii Taylor, to the [facility] [place]. Please see a copy of my visit note below.    Visit Date: 07/21/2021    Brii Taylor is a 55-year-old patient who is here today for interim followup.    CHIEF COMPLAINT:    1.  LCIS of the left breast.  2.  Family history of breast cancer in a first-degree relative.    HISTORY OF PRESENT ILLNESS:  Brii is 55 years old.  She has got a diagnosis of LCIS and atypical ductal hyperplasia in the left breast.  She also has a family history of breast cancer.  Her sister had breast cancer at 38 and then again at 50.  Her mother had non-Hodgkin's lymphoma.  Patient's sister did have genetic testing done, which was negative.  Because Brii has heterogeneously dense breast tissue, we do mammograms and MRI scans on her and spread them out every 6 months.  She has recently had a bilateral MRI scan done, and I have reviewed that with her today, and that was negative.  She was on tamoxifen for chemoprevention, and then we switched over to an aromatase inhibitor because she went into the menopause; however, she does find that the aromatase inhibitor has more side effects.  Specifically, she is getting pain in her joints, which she rates at a 6/10.  Having discussed the side effects today, we have decided to put her back on tamoxifen.  She is in agreement with that plan.    COMPREHENSIVE REVIEW OF SYSTEMS:  A 12-point comprehensive review of systems has been done with her and is otherwise unremarkable.    PAST MEDICAL HISTORY:  History of migraines, history of LCIS and atypical ductal hyperplasia of the left breast.    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  She is a nonsmoker.  She has 2 adult children.    PHYSICAL EXAMINATION:    GENERAL:  She is well-appearing lady in no  acute distress.  VITAL SIGNS:  Stable.  NECK:  Has no masses or goiter.  CHEST:  Clear to auscultation and percussion bilaterally.  HEART:  Sounds 1 and 2, normal, no added sounds, no murmurs.  BREASTS:  Right breast normal, no palpable masses.  Left breast, status post lumpectomy.  The scar looks good.  No further palpable masses.  Right and left axillae negative.  GASTROINTESTINAL:  Abdomen is soft and nontender.  No hepatosplenomegaly.  EXTREMITIES:  Legs without tenderness or edema.  NEUROLOGIC:  Peripheral neurological exam grossly intact.  SKIN:  No rashes or lesions.    DATA REVIEW:  I have reviewed her recent MRI scan.  White cell count 5.8, hemoglobin 13.6, platelets 259.  Liver function test within normal limits.  Creatinine 0.71.    ASSESSMENT AND PLAN:  This is a is a 55-year-old patient with lobular carcinoma in situ and atypical ductal hyperplasia of the left breast.  She has been recently on Arimidex for chemoprevention but has had quite a bit of pain in her joints with it.  We have discussed the side effect profile today.  I have decided to switch her back over to tamoxifen.  She is in agreement with that plan.  I have checked blood work on her today, and I will see her back in 6 months to see how she is doing on the tamoxifen.  All of the above has been discussed with her, and she is in agreement with the plan.    Aubrie Barnes MD        D: 2021   T: 2021   MT: [facility]    Name:     VIGNESH MCGILL  MRN:      [id number]        Account:    [id number]   :      1965           Visit Date: 2021     Document: [id number]      Again, thank you for allowing me to participate in the care of your patient.        Sincerely,        Aubrie Barnes MD

## 2021-07-22 NOTE — PROGRESS NOTES
Visit Date: 07/21/2021    Brii Taylor is a 55-year-old patient who is here today for interim followup.    CHIEF COMPLAINT:    1.  LCIS of the left breast.  2.  Family history of breast cancer in a first-degree relative.    HISTORY OF PRESENT ILLNESS:  Brii is 55 years old.  She has got a diagnosis of LCIS and atypical ductal hyperplasia in the left breast.  She also has a family history of breast cancer.  Her sister had breast cancer at 38 and then again at 50.  Her mother had non-Hodgkin's lymphoma.  Patient's sister did have genetic testing done, which was negative.  Because Brii has heterogeneously dense breast tissue, we do mammograms and MRI scans on her and spread them out every 6 months.  She has recently had a bilateral MRI scan done, and I have reviewed that with her today, and that was negative.  She was on tamoxifen for chemoprevention, and then we switched over to an aromatase inhibitor because she went into the menopause; however, she does find that the aromatase inhibitor has more side effects.  Specifically, she is getting pain in her joints, which she rates at a 6/10.  Having discussed the side effects today, we have decided to put her back on tamoxifen.  She is in agreement with that plan.    COMPREHENSIVE REVIEW OF SYSTEMS:  A 12-point comprehensive review of systems has been done with her and is otherwise unremarkable.    PAST MEDICAL HISTORY:  History of migraines, history of LCIS and atypical ductal hyperplasia of the left breast.    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  She is a nonsmoker.  She has 2 adult children.    PHYSICAL EXAMINATION:    GENERAL:  She is well-appearing lady in no acute distress.  VITAL SIGNS:  Stable.  NECK:  Has no masses or goiter.  CHEST:  Clear to auscultation and percussion bilaterally.  HEART:  Sounds 1 and 2, normal, no added sounds, no murmurs.  BREASTS:  Right breast normal, no palpable masses.  Left breast,  status post lumpectomy.  The scar looks good.  No further palpable masses.  Right and left axillae negative.  GASTROINTESTINAL:  Abdomen is soft and nontender.  No hepatosplenomegaly.  EXTREMITIES:  Legs without tenderness or edema.  NEUROLOGIC:  Peripheral neurological exam grossly intact.  SKIN:  No rashes or lesions.    DATA REVIEW:  I have reviewed her recent MRI scan.  White cell count 5.8, hemoglobin 13.6, platelets 259.  Liver function test within normal limits.  Creatinine 0.71.    ASSESSMENT AND PLAN:  This is a is a 55-year-old patient with lobular carcinoma in situ and atypical ductal hyperplasia of the left breast.  She has been recently on Arimidex for chemoprevention but has had quite a bit of pain in her joints with it.  We have discussed the side effect profile today.  I have decided to switch her back over to tamoxifen.  She is in agreement with that plan.  I have checked blood work on her today, and I will see her back in 6 months to see how she is doing on the tamoxifen.  All of the above has been discussed with her, and she is in agreement with the plan.    Aubrie Barnes MD        D: 2021   T: 2021   MT: [facility]    Name:     VIGNESH MCGILLSpeedy  MRN:      [id number]        Account:    [id number]   :      1965           Visit Date: 2021     Document: [id number]

## 2021-08-02 ENCOUNTER — OFFICE VISIT (OUTPATIENT)
Dept: FAMILY MEDICINE | Facility: CLINIC | Age: 56
End: 2021-08-02
Payer: COMMERCIAL

## 2021-08-02 VITALS
BODY MASS INDEX: 22.76 KG/M2 | OXYGEN SATURATION: 100 % | SYSTOLIC BLOOD PRESSURE: 110 MMHG | RESPIRATION RATE: 10 BRPM | DIASTOLIC BLOOD PRESSURE: 74 MMHG | TEMPERATURE: 98.5 F | WEIGHT: 141 LBS | HEART RATE: 68 BPM

## 2021-08-02 DIAGNOSIS — R29.898 POPLITEAL FULLNESS: Primary | ICD-10-CM

## 2021-08-02 PROBLEM — M26.639 ARTICULAR DISC DISORDER OF TEMPOROMANDIBULAR JOINT: Status: ACTIVE | Noted: 2020-07-01

## 2021-08-02 PROBLEM — R06.83 SNORING: Status: ACTIVE | Noted: 2020-07-01

## 2021-08-02 PROCEDURE — 99213 OFFICE O/P EST LOW 20 MIN: CPT | Performed by: FAMILY MEDICINE

## 2021-08-02 NOTE — PATIENT INSTRUCTIONS
[facility] [phone],   Toll Free: [phone]       Patient Education     Joseph s Cyst    You have a Baker s cyst. This is a lump or bulge in the back of your knee. It is caused when extra joint fluid flows into a small sac behind the knee. The extra fluid occurs because arthritis or a torn cartilage irritates the knee joint.  A small Joseph s cyst often has no symptoms. A larger cyst can cause some knee pain or a feeling of pressure behind your knee when you try to fully straighten or bend that joint. A Baker s cyst can leak, leading fluid to move down into your lower leg. This causes swelling, pain, and redness.  Treatment may involve draining the extra fluid. Or medicine may be injected to reduce redness and swelling. If the extra fluid is caused by a torn cartilage, then surgery to repair the cartilage may be the best treatment option. If arthritis is the cause, and it does not get better with treatment, surgery may need to address the arthritis and cyst.  Home care    If you have knee pain, stay off the affected leg as much as possible until the pain eases.    Apply an ice pack to the painful area for no more than 20 minutes. Do this every 3 to 6 hours for the first 24 to 48 hours. Keep using ice packs 3 to 4 times a day for the next few days, as needed for pain. To make an ice pack, put ice cubes in a sealed zip-lock plastic bag. Wrap the bag in a clean, thin towel or cloth. Never put ice or an ice pack directly on the skin.    If you were given a hook-and-loop knee brace, you may open the brace to apply ice. Unless told otherwise, you may remove the brace to bathe and sleep.    You may use over-the-counter pain medicine to control pain, unless another medicine was prescribed. Talk with your provider before using these medicines if you have chronic liver or kidney disease, or have ever had a stomach ulcer or gastrointestinal bleeding.       If crutches or a  walker have been recommended, don t bear full weight on your injured leg until you can do so without pain. Check with your provider before returning to sports or full work duties.    Follow-up care  Follow up with your healthcare provider within 1 to 2 weeks, or as advised.  If X-rays were taken, you will be notified of any new findings that may affect your care.  When to seek medical advice  Call your healthcare provider right away if any of these occur:    Toes or foot become swollen, cold, blue, numb or tingly    Pain or swelling increases    Warmth or redness appears over the knee    Redness, swelling or pain occurs in the calf or lower leg    Fever or chills  Justino last reviewed this educational content on 5/1/2018 2000-2021 [employer]. All rights reserved. This information is not intended as a substitute for professional medical care. Always follow your healthcare professional's instructions.

## 2021-08-02 NOTE — PROGRESS NOTES
Assessment & Plan     Popliteal fullness  Will order bilateral US to evaluate for Baker's Cysts.  DVT unlikely based on exam, but can consider evaluation if US negative for cysts.  Follow up pending results.  - US Extremity Non Vascular Bilateral; Future    15 minutes spent on the date of the encounter doing chart review, history and exam, documentation and further activities per the note     See Patient Instructions    Return in about 3 months (around 11/2/2021) for Preventative Care Visit.    Nazanin Segal MD  [facility] BROOKS Teresa is a 55 year old who presents for the following health issues     HPI     Musculoskeletal problem/pain  Onset/Duration: 1 month  Description  Location: leg - bilateral  Joint Swelling: Yes  Redness: no  Pain: Yes  Warmth: no  Intensity:  Severe at times  Progression of Symptoms:  worsening  Accompanying signs and symptoms:   Fevers: no  Numbness/tingling/weakness: no  History  Trauma to the area: no  Recent illness:  no  Previous similar problem: no  Previous evaluation:  no  Precipitating or alleviating factors:  Aggravating factors include: bending  Therapies tried and outcome:  Ibuprofen    Has been dealing with pain and sensation of swelling behind both knees.  Feels swollen, bending causes very tight feeling.  No actual swelling in ankles, feet, knees.  Will get some shooting pain behind left knee down calf.  No redness or warmth. No accident or injuries known.    Review of Systems   Constitutional, HEENT, cardiovascular, pulmonary, gi and gu systems are negative, except as otherwise noted.      Objective    /74 (BP Location: Right arm, Patient Position: Chair, Cuff Size: Adult Regular)   Pulse 68   Temp 98.5  F (36.9  C) (Oral)   Resp 10   Wt 64 kg (141 lb)   SpO2 100%   BMI 22.76 kg/m    Body mass index is 22.76 kg/m .  Physical Exam   GENERAL: healthy, alert and no distress  MS: no gross musculoskeletal defects  noted, no edema  MS: extremities normal- no gross deformities noted.  No edema to popliteal fossae, knees, ankles, or feet bilaterally.  Some tenderness to palpitation of popliteal fossa bilaterally, no palpable masses.  No palpable cords in calves, no erythema or warmth.  Pedal pulses intact bilaterally.

## 2021-08-09 ENCOUNTER — HOSPITAL ENCOUNTER (OUTPATIENT)
Dept: ULTRASOUND IMAGING | Facility: CLINIC | Age: 56
Discharge: HOME | End: 2021-08-09
Attending: FAMILY MEDICINE | Admitting: FAMILY MEDICINE
Payer: COMMERCIAL

## 2021-08-09 DIAGNOSIS — R29.898 POPLITEAL FULLNESS: ICD-10-CM

## 2021-08-09 PROCEDURE — 76882 US LMTD JT/FCL EVL NVASC XTR: CPT | Mod: 50

## 2021-08-20 ENCOUNTER — MYC MEDICAL ADVICE (OUTPATIENT)
Dept: FAMILY MEDICINE | Facility: CLINIC | Age: 56
End: 2021-08-20

## 2021-10-23 ENCOUNTER — HEALTH MAINTENANCE LETTER (OUTPATIENT)
Age: 56
End: 2021-10-23

## 2021-12-29 ENCOUNTER — HOSPITAL ENCOUNTER (OUTPATIENT)
Dept: MAMMOGRAPHY | Facility: CLINIC | Age: 56
Discharge: HOME | End: 2021-12-29
Attending: INTERNAL MEDICINE | Admitting: INTERNAL MEDICINE
Payer: COMMERCIAL

## 2021-12-29 DIAGNOSIS — Z12.31 VISIT FOR SCREENING MAMMOGRAM: ICD-10-CM

## 2021-12-29 PROCEDURE — 77063 BREAST TOMOSYNTHESIS BI: CPT

## 2022-01-26 ENCOUNTER — ONCOLOGY VISIT (OUTPATIENT)
Dept: ONCOLOGY | Facility: CLINIC | Age: 57
End: 2022-01-26
Attending: INTERNAL MEDICINE
Payer: COMMERCIAL

## 2022-01-26 VITALS
OXYGEN SATURATION: 95 % | RESPIRATION RATE: 16 BRPM | WEIGHT: 146 LBS | HEART RATE: 63 BPM | TEMPERATURE: 97.6 F | BODY MASS INDEX: 23.57 KG/M2 | DIASTOLIC BLOOD PRESSURE: 71 MMHG | SYSTOLIC BLOOD PRESSURE: 113 MMHG

## 2022-01-26 DIAGNOSIS — Z80.3 FHX: BREAST CANCER IN FIRST DEGREE RELATIVE: Primary | ICD-10-CM

## 2022-01-26 DIAGNOSIS — D05.02 NEOPLASM OF LEFT BREAST, PRIMARY TUMOR STAGING CATEGORY TIS: LOBULAR CARCINOMA IN SITU (LCIS): ICD-10-CM

## 2022-01-26 PROCEDURE — 99214 OFFICE O/P EST MOD 30 MIN: CPT | Performed by: INTERNAL MEDICINE

## 2022-01-26 PROCEDURE — G0463 HOSPITAL OUTPT CLINIC VISIT: HCPCS

## 2022-01-26 RX ORDER — TAMOXIFEN CITRATE 20 MG/1
20 TABLET ORAL DAILY
Qty: 90 TABLET | Refills: 3 | Status: SHIPPED | OUTPATIENT
Start: 2022-01-26 | End: 2023-02-14

## 2022-01-26 ASSESSMENT — PAIN SCALES - GENERAL: PAINLEVEL: NO PAIN (0)

## 2022-01-26 NOTE — LETTER
"    1/26/2022         RE: Brii Taylor  [address] [facility] [address]        Dear Colleague,    Thank you for referring your patient, Brii Taylor, to the [facility]. Please see a copy of my visit note below.    Oncology Rooming Note    January 26, 2022 4:09 PM   Brii Taylor is a 56 year old female who presents for:    Chief Complaint   Patient presents with     Oncology Clinic Visit     Breast neoplasm, Tis (LCIS)     Initial Vitals: /71   Pulse 63   Temp 97.6  F (36.4  C) (Tympanic)   Resp 16   Wt 66.2 kg (146 lb)   SpO2 95%   BMI 23.57 kg/m   Estimated body mass index is 23.57 kg/m  as calculated from the following:    Height as of 8/19/20: 1.676 m (5' 6\").    Weight as of this encounter: 66.2 kg (146 lb). Body surface area is 1.76 meters squared.  No Pain (0) Comment: Data Unavailable   No LMP recorded. Patient is premenopausal.  Allergies reviewed: Yes  Medications reviewed: Yes    Medications: Medication refills not needed today.  Pharmacy name entered into [employer]:    [id number] [place], [place] - [address] [id number] [place] [address] - [address] [address] SEC OF [facility] [address]      Love Hughes [facility]                Visit Date: 01/26/2022    Brii Taylor is a 56-year-old patient who comes in today for interim followup.    CHIEF COMPLAINT:    1.  LCIS of the left breast.  2.  Family history of breast cancer in first-degree relative.  3.  Lifetime risk of breast cancer greater than 20%.    HISTORY OF PRESENT ILLNESS:  Brii is a 56-year-old patient with a diagnosis of LCIS as well as atypical ductal hyperplasia in the left breast.  She also has a sister who had breast cancer at age 38 and then again at 50.  Her mother had non-Hodgkin's lymphoma.  The patient's sister had genetic testing done, which showed no evidence of any abnormal mutation.  Brii has a lifetime risk of breast cancer that is greater " than 20%, so she comes in every 6 months with a physical exam. She also does intermittent mammograms and MRI scans.  She comes in today for interim followup.  She is due for an MRI scan coming up in the summer of 2022.  I have ordered that today.  She had a screening mammogram done in 12/2021.  She still has dense breast tissue and that was benign.  I have discussed the results of that with her today.     She is on tamoxifen for prevention and seems to be tolerating it okay.  She is getting some hot flashes from it.  She is also getting some other menopausal side effects.  We have discussed the side effects today and I have given her some ideas on how to counteract some of the side effects.    REVIEW OF SYSTEMS:  A 12-point comprehensive review of systems is otherwise unremarkable.    PAST MEDICAL HISTORY:    1.  History of LCIS.  2.  History of atypical ductal hyperplasia of the left breast.  3.  History of migraines.    SOCIAL HISTORY:  The patient works as a .  She is a nonsmoker.  She has 2 adult children.  She does exercise.    PAIN ASSESSMENT:  She is in no pain today.    PHYSICAL EXAMINATION:    GENERAL:  She is a well-appearing lady in no acute distress.  VITAL SIGNS:  Stable.  NECK:  Has no masses or goiter.  CHEST:  Clear to auscultation and percussion bilaterally.  HEART:  Sounds 1 and 2 normal, no added sounds or murmurs.  BREASTS:  Right breast, no palpable masses.  Right axilla negative.  Left breast, status post lumpectomy in the upper outer quadrant. Scar looks good.  No further palpable masses.  Left axilla negative.  GASTROINTESTINAL:  Abdomen is soft and nontender.  No hepatosplenomegaly.  EXTREMITIES:  Legs without tenderness or edema.  NEUROLOGIC:  Peripheral neurological exam grossly intact.  SKIN:  Has no rashes or lesions.    DATA REVIEW:  I have reviewed her last MRI and mammogram.  She is due for an MRI scan in the summer.  I have not drawn blood work today.    ASSESSMENT  AND PLAN:  A 56-year-old postmenopausal patient with lobular carcinoma in situ as well as atypical ductal hyperplasia of the left breast.  We have her on tamoxifen for chemo prevention.  She did try an aromatase inhibitor, but could not tolerate it.  She is doing well on the tamoxifen.  We are going to keep her on the tamoxifen.  We have discussed the risks, benefits, and side effects today.  I will see her back in my clinic in 6 months.    Aubrie Barnes MD        D: 2022   T: 2022   MT: [id number]    Name:     VIGNESH MCGILL  MRN:      [id number]        Account:    [id number]   :      1965           Visit Date: 2022     Document: [id number]      Again, thank you for allowing me to participate in the care of your patient.        Sincerely,        Aubrie Barnes MD

## 2022-01-26 NOTE — PROGRESS NOTES
"Oncology Rooming Note    January 26, 2022 4:09 PM   Brii Taylor is a 56 year old female who presents for:    Chief Complaint   Patient presents with     Oncology Clinic Visit     Breast neoplasm, Tis (LCIS)     Initial Vitals: /71   Pulse 63   Temp 97.6  F (36.4  C) (Tympanic)   Resp 16   Wt 66.2 kg (146 lb)   SpO2 95%   BMI 23.57 kg/m   Estimated body mass index is 23.57 kg/m  as calculated from the following:    Height as of 8/19/20: 1.676 m (5' 6\").    Weight as of this encounter: 66.2 kg (146 lb). Body surface area is 1.76 meters squared.  No Pain (0) Comment: Data Unavailable   No LMP recorded. Patient is premenopausal.  Allergies reviewed: Yes  Medications reviewed: Yes    Medications: Medication refills not needed today.  Pharmacy name entered into [employer]:    [id number] [place], [place] - [address] [id number] [place], [place] - [address] [place] [address]      Love Hughes CMA              "

## 2022-01-27 NOTE — PROGRESS NOTES
Visit Date: 01/26/2022    Brii Taylor is a 56-year-old patient who comes in today for interim followup.    CHIEF COMPLAINT:    1.  LCIS of the left breast.  2.  Family history of breast cancer in first-degree relative.  3.  Lifetime risk of breast cancer greater than 20%.    HISTORY OF PRESENT ILLNESS:  Brii is a 56-year-old patient with a diagnosis of LCIS as well as atypical ductal hyperplasia in the left breast.  She also has a sister who had breast cancer at age 38 and then again at 50.  Her mother had non-Hodgkin's lymphoma.  The patient's sister had genetic testing done, which showed no evidence of any abnormal mutation.  Brii has a lifetime risk of breast cancer that is greater than 20%, so she comes in every 6 months with a physical exam. She also does intermittent mammograms and MRI scans.  She comes in today for interim followup.  She is due for an MRI scan coming up in the summer of 2022.  I have ordered that today.  She had a screening mammogram done in 12/2021.  She still has dense breast tissue and that was benign.  I have discussed the results of that with her today.     She is on tamoxifen for prevention and seems to be tolerating it okay.  She is getting some hot flashes from it.  She is also getting some other menopausal side effects.  We have discussed the side effects today and I have given her some ideas on how to counteract some of the side effects.    REVIEW OF SYSTEMS:  A 12-point comprehensive review of systems is otherwise unremarkable.    PAST MEDICAL HISTORY:    1.  History of LCIS.  2.  History of atypical ductal hyperplasia of the left breast.  3.  History of migraines.    SOCIAL HISTORY:  The patient works as a .  She is a nonsmoker.  She has 2 adult children.  She does exercise.    PAIN ASSESSMENT:  She is in no pain today.    PHYSICAL EXAMINATION:    GENERAL:  She is a well-appearing lady in no acute distress.  VITAL SIGNS:  Stable.  NECK:  Has no masses  or goiter.  CHEST:  Clear to auscultation and percussion bilaterally.  HEART:  Sounds 1 and 2 normal, no added sounds or murmurs.  BREASTS:  Right breast, no palpable masses.  Right axilla negative.  Left breast, status post lumpectomy in the upper outer quadrant. Scar looks good.  No further palpable masses.  Left axilla negative.  GASTROINTESTINAL:  Abdomen is soft and nontender.  No hepatosplenomegaly.  EXTREMITIES:  Legs without tenderness or edema.  NEUROLOGIC:  Peripheral neurological exam grossly intact.  SKIN:  Has no rashes or lesions.    DATA REVIEW:  I have reviewed her last MRI and mammogram.  She is due for an MRI scan in the summer.  I have not drawn blood work today.    ASSESSMENT AND PLAN:  A 56-year-old postmenopausal patient with lobular carcinoma in situ as well as atypical ductal hyperplasia of the left breast.  We have her on tamoxifen for chemo prevention.  She did try an aromatase inhibitor, but could not tolerate it.  She is doing well on the tamoxifen.  We are going to keep her on the tamoxifen.  We have discussed the risks, benefits, and side effects today.  I will see her back in my clinic in 6 months.    Aubrie Barnes MD        D: 2022   T: 2022   MT: [id number]    Name:     ARTEM VIGNESHROBERTA VALERIO  MRN:      [id number]        Account:    [id number]   :      1965           Visit Date: 2022     Document: [id number]

## 2022-01-30 NOTE — PATIENT INSTRUCTIONS
RTC MD 6 months w/ labs -- 7/27/22 at 9:30am, 10am  MRI breast prior -- 7/21/22 at 2pm    Melissa Ruff, RN, BSN, OCN  Nurse Care Coordinator  [facility] [place]  P: [phone]     F: [phone]

## 2022-02-01 ENCOUNTER — TELEPHONE (OUTPATIENT)
Dept: ONCOLOGY | Facility: CLINIC | Age: 57
End: 2022-02-01
Payer: COMMERCIAL

## 2022-02-01 NOTE — PROGRESS NOTES
Received positive distress screen regarding patient's concern for current weight.  Called and left RD contact information on patient's voicemail.  Await return call from patient.    Desirae Madrid, RD, LD  Clinical Dietitian  [facility]  [phone] (direct)

## 2022-02-12 ENCOUNTER — HEALTH MAINTENANCE LETTER (OUTPATIENT)
Age: 57
End: 2022-02-12

## 2022-02-15 DIAGNOSIS — F41.9 ANXIETY: ICD-10-CM

## 2022-02-15 NOTE — CONFIDENTIAL NOTE
Pending Prescriptions:                       Disp   Refills    sertraline (ZOLOFT) 100 MG tablet         90 tab*1            Sig: Take 1 tablet (100 mg) by mouth daily          Last Written Prescription Date:  6/2/21  Last Fill Quantity: 90,   # refills: 1  Last Office Visit: 1/26/22  Future Office visit:  7/27/22    Routing refill request to provider for review/approval.    Melissa Ruff, RN, BSN, OCN  Nurse Care Coordinator  [facility] [place]  P: [phone]     F: [phone]

## 2022-02-21 RX ORDER — SERTRALINE HYDROCHLORIDE 100 MG/1
100 TABLET, FILM COATED ORAL DAILY
Qty: 90 TABLET | Refills: 1 | Status: SHIPPED | OUTPATIENT
Start: 2022-02-21 | End: 2022-10-03

## 2022-02-21 NOTE — CONFIDENTIAL NOTE
Signed Prescriptions:                        Disp   Refills    sertraline (ZOLOFT) 100 MG tablet          90 tab*1        Sig: Take 1 tablet (100 mg) by mouth daily  Authorizing Provider: BRENDA SWANSON, RN, BSN, OCN  Nurse Care Coordinator  [facility]  P: [phone]     F: [phone]

## 2022-03-29 ENCOUNTER — VIRTUAL VISIT (OUTPATIENT)
Dept: ONCOLOGY | Facility: CLINIC | Age: 57
End: 2022-03-29
Attending: DIETITIAN, REGISTERED
Payer: COMMERCIAL

## 2022-03-29 DIAGNOSIS — D05.02 NEOPLASM OF LEFT BREAST, PRIMARY TUMOR STAGING CATEGORY TIS: LOBULAR CARCINOMA IN SITU (LCIS): Primary | ICD-10-CM

## 2022-03-29 PROCEDURE — 97802 MEDICAL NUTRITION INDIV IN: CPT | Mod: GT,95 | Performed by: DIETITIAN, REGISTERED

## 2022-03-29 NOTE — LETTER
"    3/29/2022         RE: Brii Taylor  [address] [facility] [address]        Dear Colleague,    Thank you for referring your patient, Brii Taylor, to the [facility]. Please see a copy of my visit note below.    Video-Visit Details    Type of service:  Video Visit    Video Start Time (time video started): 1:01    Video End Time (time video stopped): 1:38    Originating Location (pt. Location): Home    Distant Location (provider location):  [facility]     Mode of Communication:  Video Conference via Mobile Pulse    CLINICAL NUTRITION SERVICES - ASSESSMENT NOTE  Brii Taylor 56 year old referred for MNT related toNeoplasm of left breast, primary tumor staging category Tis: lobular carcinoma in situ (LCIS)    Time Spent: 37 minutes  Visit Type: Initial   Pt accompanied by: self  Referring Physician: Dr. Barnes     NUTRITION HISTORY  Factors affecting nutrition intake include:none  Current diet/appetite: regular/good   Hormone therapy: Tamoxifen     Patient states has symptoms from tamoxifen: achy joints, weight gain (10-15 lbs), hot flashes    Likes watermelon, strawberries, apples, grapes   Broccoli and green beans; salad-Tino fat free dressing (Pashto and thousand island) + cottage cheese; limits vegetables     Hnlrlzwj-9-4 miles walking at work; 3 days per week HIIT (20 minutes) starting in January and feels has lost weight     Diet Recall  Breakfast Skips or banana    Lunch 12:30 ham/cold cuts + fruit + Sunchips    Dinner Chicken or pasta; potatoes-BBQ sauce, spaghetti sauce    Snacks Popcorn; likes sweets    Beverages 2 Diet pop; water      ANTHROPOMETRICS  Height: 5'6\"  Weight: 146 lbs   BMI: 23.6 kg/m2   Weight Status:  Normal BMI  IBW: 130 lbs +/-10% (112%)  Weight History:   Wt Readings from Last 10 Encounters:   01/26/22 66.2 kg (146 lb)   08/02/21 64 kg (141 lb)   07/21/21 64.4 kg (142 lb)   03/15/21 63.5 kg (140 lb) "   08/19/20 61.2 kg (135 lb)   07/21/20 62.6 kg (138 lb)   02/19/20 62.7 kg (138 lb 3.2 oz)   01/24/20 63.5 kg (140 lb)   01/20/20 64 kg (141 lb)   12/30/19 62.6 kg (138 lb)     Dosing Weight: 66.2 kg    Medications/vitamins/minerals/herbals:   Reviewed    Labs:   Labs reviewed    NUTRITION FOCUSED PHYSICAL ASSESSMENT FOR DIAGNOSING MALNUTRITION:  Yes    No nutrition focused physical findings     ASSESSED NUTRITION NEEDS:  Estimated Energy Needs: 5707-5184 kcals (20-22 Kcal/Kg)  Justification: for gradual weight loss   Estimated Protein Needs: 59-70 grams protein (1-1.2 g pro/Kg)  Justification: preservation of lean body mass  Estimated Fluid Needs: 7210-5621  mL   Justification: maintenance    MALNUTRITION:  % Weight Loss:  None noted  % Intake:  No decreased intake noted  Subcutaneous Fat Loss:  None observed  Muscle Loss:  None observed  Fluid Retention:  None noted    Malnutrition Diagnosis: Patient does not meet two of the above criteria necessary for diagnosing malnutrition    NUTRITION DIAGNOSIS:  Food and nutrition-related knowledge deficit related to lack of prior exposure as evidenced by no previous need for food and nutrition related recommendations     INTERVENTIONS  Provided written & verbal education:     - Discussed label reading and instructed patient on label.  Recommend <135 grams carbohydrate with <24 grams added sugar per day.    - Focused on foods to fight cancer with emphasis on fruits and vegetables, legumes, whole grains and fish.  Suggest omega 3 fatty acids foods for reducing inflammation.  - Encouraged exercise and strength training for weight management.  Congratulated patient for starting HIIT regimen.  Suggest increase activity and movement as the weather warms.  - Discussed probiotics and tumeric per patient request. Suggest foods containing live active cultures and tumeric spice in cooking.   - Reviewed nutrition and hydration needs.   Advised pt to aim for at least 0891-8338 kcal and  60-70 g protein daily.   Pt verbalize understanding of materials provided during consult.   Patient Understanding: good  Expected patient engagement: good      Implementation  Composition of Meals and Snacks and General/healthful diet    Goals  1.  Aim for < 24 grams added sugar per day  2.  Aim for <135 grams carbohydrate  3.  Increase fruit and vegetable intake 5-7 servings per day     Follow-Up Plans: Pt has RD contact information for questions.      MONITORING AND EVALUATION:  -Food/beverage intake  -Weight trends    Desirae Madrid, RD, LD  Clinical Dietitian  [facility]  [phone] (direct)      Again, thank you for allowing me to participate in the care of your patient.        Sincerely,        Girish White, MELE, LD

## 2022-03-29 NOTE — PROGRESS NOTES
"Video-Visit Details    Type of service:  Video Visit    Video Start Time (time video started): 1:01    Video End Time (time video stopped): 1:38    Originating Location (pt. Location): Home    Distant Location (provider location):  [facility]     Mode of Communication:  Video Conference via [facility]    CLINICAL NUTRITION SERVICES - ASSESSMENT NOTE  Brii Taylor 56 year old referred for MNT related toNeoplasm of left breast, primary tumor staging category Tis: lobular carcinoma in situ (LCIS)    Time Spent: 37 minutes  Visit Type: Initial   Pt accompanied by: self  Referring Physician: Dr. Barnes     NUTRITION HISTORY  Factors affecting nutrition intake include:none  Current diet/appetite: regular/good   Hormone therapy: Tamoxifen     Patient states has symptoms from tamoxifen: achy joints, weight gain (10-15 lbs), hot flashes    Likes watermelon, strawberries, apples, grapes   Broccoli and green beans; salad-Tino fat free dressing (Welsh and thousand island) + cottage cheese; limits vegetables     Xoahxpqy-6-7 miles walking at work; 3 days per week HIIT (20 minutes) starting in January and feels has lost weight     Diet Recall  Breakfast Skips or banana    Lunch 12:30 ham/cold cuts + fruit + Sunchips    Dinner Chicken or pasta; potatoes-BBQ sauce, spaghetti sauce    Snacks Popcorn; likes sweets    Beverages 2 Diet pop; water      ANTHROPOMETRICS  Height: 5'6\"  Weight: 146 lbs   BMI: 23.6 kg/m2   Weight Status:  Normal BMI  IBW: 130 lbs +/-10% (112%)  Weight History:   Wt Readings from Last 10 Encounters:   01/26/22 66.2 kg (146 lb)   08/02/21 64 kg (141 lb)   07/21/21 64.4 kg (142 lb)   03/15/21 63.5 kg (140 lb)   08/19/20 61.2 kg (135 lb)   07/21/20 62.6 kg (138 lb)   02/19/20 62.7 kg (138 lb 3.2 oz)   01/24/20 63.5 kg (140 lb)   01/20/20 64 kg (141 lb)   12/30/19 62.6 kg (138 lb)     Dosing Weight: 66.2 kg    Medications/vitamins/minerals/herbals:   Reviewed    Labs: "   Labs reviewed    NUTRITION FOCUSED PHYSICAL ASSESSMENT FOR DIAGNOSING MALNUTRITION:  Yes    No nutrition focused physical findings     ASSESSED NUTRITION NEEDS:  Estimated Energy Needs: 8743-4755 kcals (20-22 Kcal/Kg)  Justification: for gradual weight loss   Estimated Protein Needs: 59-70 grams protein (1-1.2 g pro/Kg)  Justification: preservation of lean body mass  Estimated Fluid Needs: 5694-4761  mL   Justification: maintenance    MALNUTRITION:  % Weight Loss:  None noted  % Intake:  No decreased intake noted  Subcutaneous Fat Loss:  None observed  Muscle Loss:  None observed  Fluid Retention:  None noted    Malnutrition Diagnosis: Patient does not meet two of the above criteria necessary for diagnosing malnutrition    NUTRITION DIAGNOSIS:  Food and nutrition-related knowledge deficit related to lack of prior exposure as evidenced by no previous need for food and nutrition related recommendations     INTERVENTIONS  Provided written & verbal education:     - Discussed label reading and instructed patient on label.  Recommend <135 grams carbohydrate with <24 grams added sugar per day.    - Focused on foods to fight cancer with emphasis on fruits and vegetables, legumes, whole grains and fish.  Suggest omega 3 fatty acids foods for reducing inflammation.  - Encouraged exercise and strength training for weight management.  Congratulated patient for starting HIIT regimen.  Suggest increase activity and movement as the weather warms.  - Discussed probiotics and tumeric per patient request. Suggest foods containing live active cultures and tumeric spice in cooking.   - Reviewed nutrition and hydration needs.   Advised pt to aim for at least 6062-1264 kcal and 60-70 g protein daily.   Pt verbalize understanding of materials provided during consult.   Patient Understanding: good  Expected patient engagement: good      Implementation  Composition of Meals and Snacks and General/healthful diet    Goals  1.  Aim for < 24  grams added sugar per day  2.  Aim for <135 grams carbohydrate  3.  Increase fruit and vegetable intake 5-7 servings per day     Follow-Up Plans: Pt has RD contact information for questions.      MONITORING AND EVALUATION:  -Food/beverage intake  -Weight trends    Desirae Madrid, RD, LD  Clinical Dietitian  [facility]  [phone] (direct)

## 2022-03-29 NOTE — LETTER
"    3/29/2022         RE: Brii Taylor  [address] [facility] [address]        Dear Colleague,    Thank you for referring your patient, Brii Taylor, to the [facility]. Please see a copy of my visit note below.    Video-Visit Details    Type of service:  Video Visit    Video Start Time (time video started): 1:01    Video End Time (time video stopped): 1:38    Originating Location (pt. Location): Home    Distant Location (provider location):  [facility]     Mode of Communication:  Video Conference via [employer]    CLINICAL NUTRITION SERVICES - ASSESSMENT NOTE  Brii Taylor 56 year old referred for MNT related toNeoplasm of left breast, primary tumor staging category Tis: lobular carcinoma in situ (LCIS)    Time Spent: 37 minutes  Visit Type: Initial   Pt accompanied by: self  Referring Physician: Dr. Barnes     NUTRITION HISTORY  Factors affecting nutrition intake include:none  Current diet/appetite: regular/good   Hormone therapy: Tamoxifen     Patient states has symptoms from tamoxifen: achy joints, weight gain (10-15 lbs), hot flashes    Likes watermelon, strawberries, apples, grapes   Broccoli and green beans; salad-Tino fat free dressing ([place]) + cottage cheese; limits vegetables     Mmcdjgan-9-1 miles walking at work; 3 days per week HIIT (20 minutes) starting in January and feels has lost weight     Diet Recall  Breakfast Skips or banana    Lunch 12:30 ham/cold cuts + fruit + Sunchips    Dinner Chicken or pasta; potatoes-BBQ sauce, spaghetti sauce    Snacks Popcorn; likes sweets    Beverages 2 Diet pop; water      ANTHROPOMETRICS  Height: 5'6\"  Weight: 146 lbs   BMI: 23.6 kg/m2   Weight Status:  Normal BMI  IBW: 130 lbs +/-10% (112%)  Weight History:   Wt Readings from Last 10 Encounters:   01/26/22 66.2 kg (146 lb)   08/02/21 64 kg (141 lb)   07/21/21 64.4 kg (142 lb)   03/15/21 63.5 kg (140 lb) "   08/19/20 61.2 kg (135 lb)   07/21/20 62.6 kg (138 lb)   02/19/20 62.7 kg (138 lb 3.2 oz)   01/24/20 63.5 kg (140 lb)   01/20/20 64 kg (141 lb)   12/30/19 62.6 kg (138 lb)     Dosing Weight: 66.2 kg    Medications/vitamins/minerals/herbals:   Reviewed    Labs:   Labs reviewed    NUTRITION FOCUSED PHYSICAL ASSESSMENT FOR DIAGNOSING MALNUTRITION:  Yes    No nutrition focused physical findings     ASSESSED NUTRITION NEEDS:  Estimated Energy Needs: 1604-2890 kcals (20-22 Kcal/Kg)  Justification: for gradual weight loss   Estimated Protein Needs: 59-70 grams protein (1-1.2 g pro/Kg)  Justification: preservation of lean body mass  Estimated Fluid Needs: 9971-6049  mL   Justification: maintenance    MALNUTRITION:  % Weight Loss:  None noted  % Intake:  No decreased intake noted  Subcutaneous Fat Loss:  None observed  Muscle Loss:  None observed  Fluid Retention:  None noted    Malnutrition Diagnosis: Patient does not meet two of the above criteria necessary for diagnosing malnutrition    NUTRITION DIAGNOSIS:  Food and nutrition-related knowledge deficit related to lack of prior exposure as evidenced by no previous need for food and nutrition related recommendations     INTERVENTIONS  Provided written & verbal education:     - Discussed label reading and instructed patient on label.  Recommend <135 grams carbohydrate with <24 grams added sugar per day.    - Focused on foods to fight cancer with emphasis on fruits and vegetables, legumes, whole grains and fish.  Suggest omega 3 fatty acids foods for reducing inflammation.  - Encouraged exercise and strength training for weight management.  Congratulated patient for starting HIIT regimen.  Suggest increase activity and movement as the weather warms.  - Discussed probiotics and tumeric per patient request. Suggest foods containing live active cultures and tumeric spice in cooking.   - Reviewed nutrition and hydration needs.   Advised pt to aim for at least 6283-8953 kcal and  60-70 g protein daily.   Pt verbalize understanding of materials provided during consult.   Patient Understanding: good  Expected patient engagement: good      Implementation  Composition of Meals and Snacks and General/healthful diet    Goals  1.  Aim for < 24 grams added sugar per day  2.  Aim for <135 grams carbohydrate  3.  Increase fruit and vegetable intake 5-7 servings per day     Follow-Up Plans: Pt has RD contact information for questions.      MONITORING AND EVALUATION:  -Food/beverage intake  -Weight trends    Desirae Madrid, RD, LD  Clinical Dietitian  [facility]  [phone] (direct)      Again, thank you for allowing me to participate in the care of your patient.        Sincerely,        Girish White, MELE, LD

## 2022-04-14 ENCOUNTER — OFFICE VISIT (OUTPATIENT)
Dept: FAMILY MEDICINE | Facility: CLINIC | Age: 57
End: 2022-04-14
Payer: COMMERCIAL

## 2022-04-14 VITALS
HEART RATE: 75 BPM | RESPIRATION RATE: 14 BRPM | WEIGHT: 147 LBS | DIASTOLIC BLOOD PRESSURE: 74 MMHG | SYSTOLIC BLOOD PRESSURE: 118 MMHG | TEMPERATURE: 97.9 F | HEIGHT: 65 IN | BODY MASS INDEX: 24.49 KG/M2

## 2022-04-14 DIAGNOSIS — Z12.4 CERVICAL CANCER SCREENING: ICD-10-CM

## 2022-04-14 DIAGNOSIS — Z23 ENCOUNTER FOR IMMUNIZATION: ICD-10-CM

## 2022-04-14 DIAGNOSIS — Z11.59 NEED FOR HEPATITIS C SCREENING TEST: ICD-10-CM

## 2022-04-14 DIAGNOSIS — Z13.220 SCREENING FOR HYPERLIPIDEMIA: ICD-10-CM

## 2022-04-14 DIAGNOSIS — Z00.00 ROUTINE GENERAL MEDICAL EXAMINATION AT A HEALTH CARE FACILITY: Primary | ICD-10-CM

## 2022-04-14 DIAGNOSIS — Z11.4 SCREENING FOR HIV (HUMAN IMMUNODEFICIENCY VIRUS): ICD-10-CM

## 2022-04-14 LAB — ERYTHROCYTE [SEDIMENTATION RATE] IN BLOOD BY WESTERGREN METHOD: 5 MM/HR (ref 0–30)

## 2022-04-14 PROCEDURE — 90750 HZV VACC RECOMBINANT IM: CPT | Performed by: FAMILY MEDICINE

## 2022-04-14 PROCEDURE — 83036 HEMOGLOBIN GLYCOSYLATED A1C: CPT | Performed by: FAMILY MEDICINE

## 2022-04-14 PROCEDURE — 84443 ASSAY THYROID STIM HORMONE: CPT | Performed by: FAMILY MEDICINE

## 2022-04-14 PROCEDURE — 36415 COLL VENOUS BLD VENIPUNCTURE: CPT | Performed by: FAMILY MEDICINE

## 2022-04-14 PROCEDURE — 99396 PREV VISIT EST AGE 40-64: CPT | Mod: 25 | Performed by: FAMILY MEDICINE

## 2022-04-14 PROCEDURE — 86803 HEPATITIS C AB TEST: CPT | Performed by: FAMILY MEDICINE

## 2022-04-14 PROCEDURE — 84439 ASSAY OF FREE THYROXINE: CPT | Performed by: FAMILY MEDICINE

## 2022-04-14 PROCEDURE — 80061 LIPID PANEL: CPT | Performed by: FAMILY MEDICINE

## 2022-04-14 PROCEDURE — 86140 C-REACTIVE PROTEIN: CPT | Performed by: FAMILY MEDICINE

## 2022-04-14 PROCEDURE — 85652 RBC SED RATE AUTOMATED: CPT | Performed by: FAMILY MEDICINE

## 2022-04-14 PROCEDURE — G0145 SCR C/V CYTO,THINLAYER,RESCR: HCPCS | Performed by: FAMILY MEDICINE

## 2022-04-14 PROCEDURE — 87624 HPV HI-RISK TYP POOLED RSLT: CPT | Performed by: FAMILY MEDICINE

## 2022-04-14 SDOH — ECONOMIC STABILITY: INCOME INSECURITY: IN THE LAST 12 MONTHS, WAS THERE A TIME WHEN YOU WERE NOT ABLE TO PAY THE MORTGAGE OR RENT ON TIME?: NO

## 2022-04-14 SDOH — ECONOMIC STABILITY: FOOD INSECURITY: WITHIN THE PAST 12 MONTHS, THE FOOD YOU BOUGHT JUST DIDN'T LAST AND YOU DIDN'T HAVE MONEY TO GET MORE.: NEVER TRUE

## 2022-04-14 SDOH — ECONOMIC STABILITY: TRANSPORTATION INSECURITY
IN THE PAST 12 MONTHS, HAS LACK OF TRANSPORTATION KEPT YOU FROM MEETINGS, WORK, OR FROM GETTING THINGS NEEDED FOR DAILY LIVING?: NO

## 2022-04-14 SDOH — ECONOMIC STABILITY: FOOD INSECURITY: WITHIN THE PAST 12 MONTHS, YOU WORRIED THAT YOUR FOOD WOULD RUN OUT BEFORE YOU GOT MONEY TO BUY MORE.: NEVER TRUE

## 2022-04-14 SDOH — ECONOMIC STABILITY: INCOME INSECURITY: HOW HARD IS IT FOR YOU TO PAY FOR THE VERY BASICS LIKE FOOD, HOUSING, MEDICAL CARE, AND HEATING?: NOT HARD AT ALL

## 2022-04-14 SDOH — ECONOMIC STABILITY: TRANSPORTATION INSECURITY
IN THE PAST 12 MONTHS, HAS THE LACK OF TRANSPORTATION KEPT YOU FROM MEDICAL APPOINTMENTS OR FROM GETTING MEDICATIONS?: NO

## 2022-04-14 SDOH — HEALTH STABILITY: PHYSICAL HEALTH: ON AVERAGE, HOW MANY DAYS PER WEEK DO YOU ENGAGE IN MODERATE TO STRENUOUS EXERCISE (LIKE A BRISK WALK)?: 3 DAYS

## 2022-04-14 SDOH — HEALTH STABILITY: PHYSICAL HEALTH: ON AVERAGE, HOW MANY MINUTES DO YOU ENGAGE IN EXERCISE AT THIS LEVEL?: 20 MIN

## 2022-04-14 ASSESSMENT — ANXIETY QUESTIONNAIRES
5. BEING SO RESTLESS THAT IT IS HARD TO SIT STILL: NOT AT ALL
GAD7 TOTAL SCORE: 0
3. WORRYING TOO MUCH ABOUT DIFFERENT THINGS: NOT AT ALL
1. FEELING NERVOUS, ANXIOUS, OR ON EDGE: NOT AT ALL
7. FEELING AFRAID AS IF SOMETHING AWFUL MIGHT HAPPEN: NOT AT ALL
2. NOT BEING ABLE TO STOP OR CONTROL WORRYING: NOT AT ALL
GAD7 TOTAL SCORE: 0
4. TROUBLE RELAXING: NOT AT ALL
7. FEELING AFRAID AS IF SOMETHING AWFUL MIGHT HAPPEN: NOT AT ALL
GAD7 TOTAL SCORE: 0
4. TROUBLE RELAXING: NOT AT ALL
5. BEING SO RESTLESS THAT IT IS HARD TO SIT STILL: NOT AT ALL
3. WORRYING TOO MUCH ABOUT DIFFERENT THINGS: NOT AT ALL
6. BECOMING EASILY ANNOYED OR IRRITABLE: NOT AT ALL
1. FEELING NERVOUS, ANXIOUS, OR ON EDGE: NOT AT ALL
7. FEELING AFRAID AS IF SOMETHING AWFUL MIGHT HAPPEN: NOT AT ALL
2. NOT BEING ABLE TO STOP OR CONTROL WORRYING: NOT AT ALL
GAD7 TOTAL SCORE: 0
6. BECOMING EASILY ANNOYED OR IRRITABLE: NOT AT ALL

## 2022-04-14 ASSESSMENT — ENCOUNTER SYMPTOMS
HEMATURIA: 0
CONSTIPATION: 0
HEARTBURN: 0
CHILLS: 0
DIZZINESS: 0
NERVOUS/ANXIOUS: 0
ABDOMINAL PAIN: 0
MYALGIAS: 0
NAUSEA: 0
HEMATOCHEZIA: 0
HEADACHES: 0
PALPITATIONS: 0
EYE PAIN: 0
FEVER: 0
JOINT SWELLING: 0
COUGH: 0
SORE THROAT: 0
DYSURIA: 0
DIARRHEA: 0
WEAKNESS: 0
PARESTHESIAS: 0
SHORTNESS OF BREATH: 0
FREQUENCY: 0
ARTHRALGIAS: 1

## 2022-04-14 ASSESSMENT — PATIENT HEALTH QUESTIONNAIRE - PHQ9
SUM OF ALL RESPONSES TO PHQ QUESTIONS 1-9: 0
10. IF YOU CHECKED OFF ANY PROBLEMS, HOW DIFFICULT HAVE THESE PROBLEMS MADE IT FOR YOU TO DO YOUR WORK, TAKE CARE OF THINGS AT HOME, OR GET ALONG WITH OTHER PEOPLE: NOT DIFFICULT AT ALL

## 2022-04-14 ASSESSMENT — SOCIAL DETERMINANTS OF HEALTH (SDOH)
IN A TYPICAL WEEK, HOW MANY TIMES DO YOU TALK ON THE PHONE WITH FAMILY, FRIENDS, OR NEIGHBORS?: TWICE A WEEK
HOW OFTEN DO YOU ATTEND CHURCH OR RELIGIOUS SERVICES?: PATIENT DECLINED
DO YOU BELONG TO ANY CLUBS OR ORGANIZATIONS SUCH AS CHURCH GROUPS UNIONS, FRATERNAL OR ATHLETIC GROUPS, OR SCHOOL GROUPS?: NO
HOW OFTEN DO YOU GET TOGETHER WITH FRIENDS OR RELATIVES?: ONCE A WEEK

## 2022-04-14 ASSESSMENT — LIFESTYLE VARIABLES
HOW MANY STANDARD DRINKS CONTAINING ALCOHOL DO YOU HAVE ON A TYPICAL DAY: PATIENT DOES NOT DRINK
AUDIT-C TOTAL SCORE: 1
SKIP TO QUESTIONS 9-10: 1
HOW OFTEN DO YOU HAVE SIX OR MORE DRINKS ON ONE OCCASION: NEVER
HOW OFTEN DO YOU HAVE A DRINK CONTAINING ALCOHOL: MONTHLY OR LESS

## 2022-04-14 NOTE — PROGRESS NOTES
SUBJECTIVE:   CC: Brii Taylor is an 56 year old woman who presents for preventive health visit.       Patient has been advised of split billing requirements and indicates understanding: Yes  Healthy Habits:     Getting at least 3 servings of Calcium per day:  NO    Bi-annual eye exam:  NO    Dental care twice a year:  Yes    Sleep apnea or symptoms of sleep apnea:  Sleep apnea    Diet:  Regular (no restrictions)    Frequency of exercise:  2-3 days/week    Duration of exercise:  15-30 minutes    Taking medications regularly:  Yes    Medication side effects:  None    PHQ-2 Total Score: 0    Additional concerns today:  Yes      Right foot pain, persistent since December. Has gotten better, medial foot. No bruising. Normal function. Fell off a small chair.     Weight gain over the past several months. On tamoxifen, perimenopausal.   Exercising 3 times per week, watching her diet.       Today's PHQ-2 Score:   PHQ-2 ( 1999 Pf[employer]) 4/14/2022   Q1: Little interest or pleasure in doing things 0   Q2: Feeling down, depressed or hopeless 0   PHQ-2 Score 0   PHQ-2 Total Score (12-17 Years)- Positive if 3 or more points; Administer PHQ-A if positive -   Q1: Little interest or pleasure in doing things Not at all   Q2: Feeling down, depressed or hopeless Not at all   PHQ-2 Score 0       Abuse: Current or Past (Physical, Sexual or Emotional) - No  Do you feel safe in your environment? Yes    Have you ever done Advance Care Planning? (For example, a Health Directive, POLST, or a discussion with a medical provider or your loved ones about your wishes): No, advance care planning information given to patient to review.  Patient plans to discuss their wishes with loved ones or provider.      Social History     Tobacco Use     Smoking status: Never Smoker     Smokeless tobacco: Never Used   Substance Use Topics     Alcohol use: No         Alcohol Use 4/14/2022   Prescreen: >3 drinks/day or >7 drinks/week? No   Prescreen: >3  drinks/day or >7 drinks/week? -       Reviewed orders with patient.  Reviewed health maintenance and updated orders accordingly - Yes  Patient Active Problem List   Diagnosis     Anxiety     CARDIOVASCULAR SCREENING; LDL GOAL LESS THAN 160     Onychomycosis     Finger pain, right     Plantar warts     Pulmonary nodules     Mitral valve prolapse     Atypical lobular hyperplasia (ALH) of left breast     Obstructive sleep apnea syndrome     Breast neoplasm, Tis (LCIS)     Snoring     Irritable bowel syndrome     Articular disc disorder of temporomandibular joint     Past Surgical History:   Procedure Laterality Date     BIOPSY BREAST SEED LOCALIZATION Left 1/24/2020    Procedure: LEFT BREAST SEED LOCALIZED EXCISIONAL BIOPSY;  Surgeon: Cristi Arndt MD;  Location: [facility] OR     COLONOSCOPY Left 12/18/2017    Procedure: COLONOSCOPY;  Colonoscopy; difficulty in advancing scope (tight corner) so used biopsy forcep to follow/ advance scope;  Surgeon: Babar Gramajo MD;  Location:  GI     TONSILLECTOMY         Social History     Tobacco Use     Smoking status: Never Smoker     Smokeless tobacco: Never Used   Substance Use Topics     Alcohol use: No     Family History   Problem Relation Age of Onset     Hypertension Mother      Cancer Mother 70        nonHodgkins lymphoma     Psychotic Disorder Mother         anxiety     Alzheimer Disease Father      Breast Cancer Sister         breast ca age 38     Gastrointestinal Disease Brother         crohns or colitis     No Known Problems Daughter      Colon Cancer No family hx of      Ovarian Cancer No family hx of            Breast Cancer Screening:  Any new diagnosis of family breast, ovarian, or bowel cancer? No    FHS-7:   Breast CA Risk Assessment (FHS-7) 12/29/2021 4/14/2022   Did any of your first-degree relatives have breast or ovarian cancer? Yes Yes   Did any of your relatives have bilateral breast cancer? No No   Did any man in your family have breast cancer? No -  "  Did any woman in your family have breast and ovarian cancer? No -   Did any woman in your family have breast cancer before age 50 y? Yes -   Do you have 2 or more relatives with breast and/or ovarian cancer? No -   Do you have 2 or more relatives with breast and/or bowel cancer? No -       Mammogram Screening: Recommended mammography every 1-2 years with patient discussion and risk factor consideration  Pertinent mammograms are reviewed under the imaging tab.    History of abnormal Pap smear: NO - age 30-65 PAP every 5 years with negative HPV co-testing recommended     Reviewed and updated as needed this visit by clinical staff   Tobacco  Allergies  Meds   Med Hx  Surg Hx  Fam Hx  Soc Hx          Reviewed and updated as needed this visit by Provider     Meds                 Review of Systems   Constitutional: Negative for chills and fever.   HENT: Negative for congestion, ear pain, hearing loss and sore throat.    Eyes: Negative for pain and visual disturbance.   Respiratory: Negative for cough and shortness of breath.    Cardiovascular: Negative for chest pain, palpitations and peripheral edema.   Gastrointestinal: Negative for abdominal pain, constipation, diarrhea, heartburn, hematochezia and nausea.   Genitourinary: Negative for dysuria, frequency, genital sores, hematuria and urgency.   Musculoskeletal: Positive for arthralgias. Negative for joint swelling and myalgias.   Skin: Negative for rash.   Neurological: Negative for dizziness, weakness, headaches and paresthesias.   Psychiatric/Behavioral: Negative for mood changes. The patient is not nervous/anxious.         OBJECTIVE:   /74 (BP Location: Right arm, Patient Position: Sitting, Cuff Size: Adult Regular)   Pulse 75   Temp 97.9  F (36.6  C) (Oral)   Resp 14   Ht 1.645 m (5' 4.75\")   Wt 66.7 kg (147 lb)   LMP 09/04/2019 (LMP Unknown)   Breastfeeding No   BMI 24.65 kg/m    Physical Exam  GENERAL: healthy, alert and no distress  EYES: " Eyes grossly normal to inspection, PERRL and conjunctivae and sclerae normal  HENT: ear canals and TM's normal, nose and mouth without ulcers or lesions  NECK: no adenopathy, no asymmetry, masses, or scars and thyroid normal to palpation  RESP: lungs clear to auscultation - no rales, rhonchi or wheezes  BREAST: normal without masses, tenderness or nipple discharge and no palpable axillary masses or adenopathy  CV: regular rate and rhythm, normal S1 S2, no S3 or S4, no murmur, click or rub, no peripheral edema and peripheral pulses strong  ABDOMEN: soft, nontender, no hepatosplenomegaly, no masses and bowel sounds normal   (female): normal female external genitalia, normal urethral meatus, vaginal mucosa pink, moist, well rugated, and normal cervix/adnexa/uterus without masses or discharge  MS: no gross musculoskeletal defects noted, no edema  SKIN: no suspicious lesions or rashes  NEURO: Normal strength and tone, mentation intact and speech normal  PSYCH: mentation appears normal, affect normal/bright    Diagnostic Test Results:  Labs reviewed in Epic    ASSESSMENT/PLAN:     1. Routine general medical examination at a health care facility  - TSH; Future  - T4, free; Future  - Lipid panel reflex to direct LDL Fasting; Future  - ESR: Erythrocyte sedimentation rate; Future  - CRP, inflammation; Future  - Hemoglobin A1c; Future  - TSH  - T4, free  - Lipid panel reflex to direct LDL Fasting  - ESR: Erythrocyte sedimentation rate  - CRP, inflammation  - Hemoglobin A1c    2. Screening for HIV (human immunodeficiency virus) - declined, low risk    3. Need for hepatitis C screening test  - Hepatitis C Screen Reflex to HCV RNA Quant and Genotype; Future  - Hepatitis C Screen Reflex to HCV RNA Quant and Genotype    4. Screening for hyperlipidemia - as above    5. Cervical cancer screening  - Pap Screen with HPV - recommended age 30 - 65 years    6. Encounter for immunization  - ZOSTER VACCINE RECOMBINANT ADJUVANTED IM NJX  -  "SCREENING QUESTIONS FOR ADULT IMMUNIZATIONS  - VACCINE ADMINISTRATION, INITIAL      COUNSELING:  Reviewed preventive health counseling, as reflected in patient instructions    Estimated body mass index is 24.65 kg/m  as calculated from the following:    Height as of this encounter: 1.645 m (5' 4.75\").    Weight as of this encounter: 66.7 kg (147 lb).      She reports that she has never smoked. She has never used smokeless tobacco.        Chelly Orozco MD  [facility]    Answers for HPI/ROS submitted by the patient on 4/14/2022  If you checked off any problems, how difficult have these problems made it for you to do your work, take care of things at home, or get along with other people?: Not difficult at all  PHQ9 TOTAL SCORE: 0  ANEL 7 TOTAL SCORE: 0      "

## 2022-04-15 LAB
CHOLEST SERPL-MCNC: 161 MG/DL
CRP SERPL-MCNC: <2.9 MG/L (ref 0–8)
FASTING STATUS PATIENT QL REPORTED: NORMAL
HCV AB SERPL QL IA: NONREACTIVE
HDLC SERPL-MCNC: 59 MG/DL
LDLC SERPL CALC-MCNC: 85 MG/DL
NONHDLC SERPL-MCNC: 102 MG/DL
T4 FREE SERPL-MCNC: 0.92 NG/DL (ref 0.76–1.46)
TRIGL SERPL-MCNC: 85 MG/DL
TSH SERPL DL<=0.005 MIU/L-ACNC: 2.92 MU/L (ref 0.4–4)

## 2022-04-15 ASSESSMENT — ANXIETY QUESTIONNAIRES: GAD7 TOTAL SCORE: 0

## 2022-04-15 ASSESSMENT — PATIENT HEALTH QUESTIONNAIRE - PHQ9: SUM OF ALL RESPONSES TO PHQ QUESTIONS 1-9: 0

## 2022-04-19 LAB
BKR LAB AP GYN ADEQUACY: NORMAL
BKR LAB AP GYN INTERPRETATION: NORMAL
BKR LAB AP HPV REFLEX: NORMAL
BKR LAB AP PREVIOUS ABNORMAL: NORMAL
HBA1C MFR BLD: 5.2 % (ref 0–5.6)
PATH REPORT.COMMENTS IMP SPEC: NORMAL
PATH REPORT.COMMENTS IMP SPEC: NORMAL
PATH REPORT.RELEVANT HX SPEC: NORMAL

## 2022-04-21 LAB
HUMAN PAPILLOMA VIRUS 16 DNA: NEGATIVE
HUMAN PAPILLOMA VIRUS 18 DNA: NEGATIVE
HUMAN PAPILLOMA VIRUS FINAL DIAGNOSIS: NORMAL
HUMAN PAPILLOMA VIRUS OTHER HR: NEGATIVE

## 2022-07-11 ENCOUNTER — HOSPITAL ENCOUNTER (OUTPATIENT)
Dept: MRI IMAGING | Facility: CLINIC | Age: 57
Discharge: HOME | End: 2022-07-11
Attending: INTERNAL MEDICINE | Admitting: INTERNAL MEDICINE
Payer: COMMERCIAL

## 2022-07-11 DIAGNOSIS — Z80.3 FHX: BREAST CANCER IN FIRST DEGREE RELATIVE: ICD-10-CM

## 2022-07-11 DIAGNOSIS — D05.02 NEOPLASM OF LEFT BREAST, PRIMARY TUMOR STAGING CATEGORY TIS: LOBULAR CARCINOMA IN SITU (LCIS): ICD-10-CM

## 2022-07-11 PROCEDURE — A9585 GADOBUTROL INJECTION: HCPCS | Performed by: INTERNAL MEDICINE

## 2022-07-11 PROCEDURE — 77049 MRI BREAST C-+ W/CAD BI: CPT

## 2022-07-11 PROCEDURE — 255N000002 HC RX 255 OP 636: Performed by: INTERNAL MEDICINE

## 2022-07-11 RX ORDER — GADOBUTROL 604.72 MG/ML
7.5 INJECTION INTRAVENOUS ONCE
Status: COMPLETED | OUTPATIENT
Start: 2022-07-11 | End: 2022-07-11

## 2022-07-11 RX ADMIN — GADOBUTROL 6.5 ML: 604.72 INJECTION INTRAVENOUS at 12:35

## 2022-07-27 ENCOUNTER — LAB (OUTPATIENT)
Dept: ONCOLOGY | Facility: CLINIC | Age: 57
End: 2022-07-27
Attending: INTERNAL MEDICINE
Payer: COMMERCIAL

## 2022-07-27 DIAGNOSIS — D05.02 NEOPLASM OF LEFT BREAST, PRIMARY TUMOR STAGING CATEGORY TIS: LOBULAR CARCINOMA IN SITU (LCIS): Primary | ICD-10-CM

## 2022-07-27 DIAGNOSIS — D05.02 NEOPLASM OF LEFT BREAST, PRIMARY TUMOR STAGING CATEGORY TIS: LOBULAR CARCINOMA IN SITU (LCIS): ICD-10-CM

## 2022-07-27 LAB
ALBUMIN SERPL BCG-MCNC: 4.4 G/DL (ref 3.5–5.2)
ALP SERPL-CCNC: 63 U/L (ref 35–104)
ALT SERPL W P-5'-P-CCNC: 17 U/L (ref 10–35)
ANION GAP SERPL CALCULATED.3IONS-SCNC: 11 MMOL/L (ref 7–15)
AST SERPL W P-5'-P-CCNC: 24 U/L (ref 10–35)
BILIRUB SERPL-MCNC: 0.6 MG/DL
BUN SERPL-MCNC: 13.2 MG/DL (ref 6–20)
CALCIUM SERPL-MCNC: 9.6 MG/DL (ref 8.6–10)
CHLORIDE SERPL-SCNC: 103 MMOL/L (ref 98–107)
CREAT SERPL-MCNC: 0.65 MG/DL (ref 0.51–0.95)
DEPRECATED HCO3 PLAS-SCNC: 27 MMOL/L (ref 22–29)
ERYTHROCYTE [DISTWIDTH] IN BLOOD BY AUTOMATED COUNT: 12.4 % (ref 10–15)
GFR SERPL CREATININE-BSD FRML MDRD: >90 ML/MIN/1.73M2
GLUCOSE SERPL-MCNC: 100 MG/DL (ref 70–99)
HCT VFR BLD AUTO: 43.7 % (ref 35–47)
HGB BLD-MCNC: 14.3 G/DL (ref 11.7–15.7)
MCH RBC QN AUTO: 29.1 PG (ref 26.5–33)
MCHC RBC AUTO-ENTMCNC: 32.7 G/DL (ref 31.5–36.5)
MCV RBC AUTO: 89 FL (ref 78–100)
PLATELET # BLD AUTO: 250 10E3/UL (ref 150–450)
POTASSIUM SERPL-SCNC: 3.8 MMOL/L (ref 3.4–5.3)
PROT SERPL-MCNC: 7.1 G/DL (ref 6.4–8.3)
RBC # BLD AUTO: 4.91 10E6/UL (ref 3.8–5.2)
SODIUM SERPL-SCNC: 141 MMOL/L (ref 136–145)
WBC # BLD AUTO: 5.2 10E3/UL (ref 4–11)

## 2022-07-27 PROCEDURE — 36415 COLL VENOUS BLD VENIPUNCTURE: CPT

## 2022-07-27 PROCEDURE — 85014 HEMATOCRIT: CPT | Performed by: INTERNAL MEDICINE

## 2022-07-27 PROCEDURE — 80053 COMPREHEN METABOLIC PANEL: CPT | Performed by: INTERNAL MEDICINE

## 2022-07-27 PROCEDURE — 99214 OFFICE O/P EST MOD 30 MIN: CPT | Mod: GT | Performed by: INTERNAL MEDICINE

## 2022-07-27 NOTE — PROGRESS NOTES
Brii is a 56 year old who is being evaluated via a billable video visit.      How would you like to obtain your AVS? MyChart  If the video visit is dropped, the invitation should be resent by: Text to cell phone: [phone]  Will anyone else be joining your video visit? Venus HOU          Video-Visit Details

## 2022-07-27 NOTE — LETTER
7/27/2022         RE: Brii Taylor  [address] [facility] [address] [address]        Dear Colleague,    Thank you for referring your patient, Brii Taylor, to the [facility]. Please see a copy of my visit note below.    Brii is a 56 year old who is being evaluated via a billable video visit.      How would you like to obtain your AVS? MyChart  If the video visit is dropped, the invitation should be resent by: Text to cell phone: [phone]  Will anyone else be joining your video visit? No     Katie Bray VF          Video-Visit Details        Visit Date: 07/27/2022    Brii Taylro has been evaluated today by a video visit due to public health emergency with coronavirus.  She has given consent     CHIEF COMPLAINT:    1.  LCIS of the left breast.  2.  Family history of breast cancer in a first-degree relative.  3.  Lifetime risk of breast cancer greater than 25%.    HISTORY OF PRESENT ILLNESS:  Brii is a 56-year-old patient with a diagnosis of LCIS.  She also had atypical ductal hyperplasia of the left breast and strong family history of breast cancer with a sister who had breast cancer at age 38 and then again at 50.  Her sister did have genetic testing done, which was normal.  Brii has had a lifetime risk of breast cancer, so she is seen intermittently for MRI scans and mammograms.  Her last mammogram was done in of 12/21 and that was negative.  I have reviewed that with her today.  She just had an MRI scan done, which I have reviewed and that was also negative.  She is on tamoxifen for prevention and has been on it now for about 2-1/2 years.  She is doing well in that she does get some occasional hot flashes, but otherwise, she has got no sinister symptomatology from it.  She will be due a screening mammogram coming up in 12/2022.  She is also due for routine blood work today.  She denies today, cough, shortness of breath, bone pain, any worrisome  symptomatology from a breast cancer standpoint.    REVIEW OF SYSTEMS:  A 12-point comprehensive review of systems is otherwise unremarkable.    SOCIAL HISTORY:  She works as a .  She is going part time in the fall of .  She is a nonsmoker.  Has 2 adult children and exercises.    PAIN ASSESSMENT:  She is in no pain today.    MEDICATIONS AND ALLERGIES:  Outlined in the nursing records.    PHYSICAL EXAMINATION:    GENERAL:  She is well-appearing lady in no acute distress.  EYES:  Have no redness or discharge.  SKIN:  Has no rashes or lesions.  MUSCULOSKELETAL:  She is moving all extremities.  NEUROLOGIC:  She is alert and oriented x3.  RESPIRATORY:  No cough or labored breathing.  PSYCHIATRIC:  Normal.    Rest of her comprehensive physical exam was deferred because this is a video visit with video visit restrictions.    DATA REVIEW:  I have reviewed her last mammogram and MRI scan and written her up for some blood work.    ASSESSMENT AND PLAN:  A 56-year-old patient with high risk of breast cancer.  She has got a LCIS as well as atypical ductal hyperplasia of the left breast and also a strong family history.  She is on tamoxifen for prevention.  She is tolerating it well.  I am happy with how things are going for her.  I will see her back in 6 months.    Aubrie Barnes MD        D: 2022   T: 2022   MT: WANDA    Name:     VIGNESH MCGILL  MRN:      [id number]        Account:    [id number]   :      1965           Visit Date: 2022     Document: [id number]      Again, thank you for allowing me to participate in the care of your patient.        Sincerely,        Aubrie Barnes MD

## 2022-07-27 NOTE — LETTER
7/27/2022         RE: Brii Taylor  [address] [facility] [address]        Dear Colleague,    Thank you for referring your patient, Brii Taylor, to the [facility]. Please see a copy of my visit note below.    Brii is a 56 year old who is being evaluated via a billable video visit.      How would you like to obtain your AVS? MyChart  If the video visit is dropped, the invitation should be resent by: Text to cell phone: [phone]  Will anyone else be joining your video visit? No     Katie Bray VF          Video-Visit Details        Visit Date: 07/27/2022    Brii Taylor has been evaluated today by a video visit due to public health emergency with coronavirus.  She has given consent     CHIEF COMPLAINT:    1.  LCIS of the left breast.  2.  Family history of breast cancer in a first-degree relative.  3.  Lifetime risk of breast cancer greater than 25%.    HISTORY OF PRESENT ILLNESS:  Brii is a 56-year-old patient with a diagnosis of LCIS.  She also had atypical ductal hyperplasia of the left breast and strong family history of breast cancer with a sister who had breast cancer at age 38 and then again at 50.  Her sister did have genetic testing done, which was normal.  Brii has had a lifetime risk of breast cancer, so she is seen intermittently for MRI scans and mammograms.  Her last mammogram was done in of 12/21 and that was negative.  I have reviewed that with her today.  She just had an MRI scan done, which I have reviewed and that was also negative.  She is on tamoxifen for prevention and has been on it now for about 2-1/2 years.  She is doing well in that she does get some occasional hot flashes, but otherwise, she has got no sinister symptomatology from it.  She will be due a screening mammogram coming up in 12/2022.  She is also due for routine blood work today.  She denies today, cough, shortness of breath, bone pain, any worrisome  symptomatology from a breast cancer standpoint.    REVIEW OF SYSTEMS:  A 12-point comprehensive review of systems is otherwise unremarkable.    SOCIAL HISTORY:  She works as a .  She is going part time in the fall of .  She is a nonsmoker.  Has 2 adult children and exercises.    PAIN ASSESSMENT:  She is in no pain today.    MEDICATIONS AND ALLERGIES:  Outlined in the nursing records.    PHYSICAL EXAMINATION:    GENERAL:  She is well-appearing lady in no acute distress.  EYES:  Have no redness or discharge.  SKIN:  Has no rashes or lesions.  MUSCULOSKELETAL:  She is moving all extremities.  NEUROLOGIC:  She is alert and oriented x3.  RESPIRATORY:  No cough or labored breathing.  PSYCHIATRIC:  Normal.    Rest of her comprehensive physical exam was deferred because this is a video visit with video visit restrictions.    DATA REVIEW:  I have reviewed her last mammogram and MRI scan and written her up for some blood work.    ASSESSMENT AND PLAN:  A 56-year-old patient with high risk of breast cancer.  She has got a LCIS as well as atypical ductal hyperplasia of the left breast and also a strong family history.  She is on tamoxifen for prevention.  She is tolerating it well.  I am happy with how things are going for her.  I will see her back in 6 months.    Aubrie Barnes MD        D: 2022   T: 2022   MT: WANDA    Name:     VIGNESH MCGILL  MRN:      [id number]        Account:    [id number]   :      1965           Visit Date: 2022     Document: [id number]      Again, thank you for allowing me to participate in the care of your patient.        Sincerely,        Aubrie Barnes MD

## 2022-07-27 NOTE — PROGRESS NOTES
Medical Assistant Note:  Brii Taylor presents today for blood draw.    Patient seen by provider today: No.   present during visit today: Not Applicable.    Concerns: No Concerns.    Procedure:  Lab draw site: rt antecub, Needle type: butterfly, Gauge: 23.    Post Assessment:  Labs drawn without difficulty: Yes.    Discharge Plan:  Departure Mode: Ambulatory.    Face to Face Time: 10 mins.    Ritika Mckeon CMA, CMA

## 2022-07-29 DIAGNOSIS — G47.33 OSA (OBSTRUCTIVE SLEEP APNEA): Primary | ICD-10-CM

## 2022-07-29 NOTE — PROGRESS NOTES
Visit Date: 07/27/2022    Brii Taylor has been evaluated today by a video visit due to public health emergency with coronavirus.  She has given consent     CHIEF COMPLAINT:    1.  LCIS of the left breast.  2.  Family history of breast cancer in a first-degree relative.  3.  Lifetime risk of breast cancer greater than 25%.    HISTORY OF PRESENT ILLNESS:  Brii is a 56-year-old patient with a diagnosis of LCIS.  She also had atypical ductal hyperplasia of the left breast and strong family history of breast cancer with a sister who had breast cancer at age 38 and then again at 50.  Her sister did have genetic testing done, which was normal.  Brii has had a lifetime risk of breast cancer, so she is seen intermittently for MRI scans and mammograms.  Her last mammogram was done in of 12/21 and that was negative.  I have reviewed that with her today.  She just had an MRI scan done, which I have reviewed and that was also negative.  She is on tamoxifen for prevention and has been on it now for about 2-1/2 years.  She is doing well in that she does get some occasional hot flashes, but otherwise, she has got no sinister symptomatology from it.  She will be due a screening mammogram coming up in 12/2022.  She is also due for routine blood work today.  She denies today, cough, shortness of breath, bone pain, any worrisome symptomatology from a breast cancer standpoint.    REVIEW OF SYSTEMS:  A 12-point comprehensive review of systems is otherwise unremarkable.    SOCIAL HISTORY:  She works as a .  She is going part time in the fall of 2022.  She is a nonsmoker.  Has 2 adult children and exercises.    PAIN ASSESSMENT:  She is in no pain today.    MEDICATIONS AND ALLERGIES:  Outlined in the nursing records.    PHYSICAL EXAMINATION:    GENERAL:  She is well-appearing lady in no acute distress.  EYES:  Have no redness or discharge.  SKIN:  Has no rashes or lesions.  MUSCULOSKELETAL:  She is moving all  extremities.  NEUROLOGIC:  She is alert and oriented x3.  RESPIRATORY:  No cough or labored breathing.  PSYCHIATRIC:  Normal.    Rest of her comprehensive physical exam was deferred because this is a video visit with video visit restrictions.    DATA REVIEW:  I have reviewed her last mammogram and MRI scan and written her up for some blood work.    ASSESSMENT AND PLAN:  A 56-year-old patient with high risk of breast cancer.  She has got a LCIS as well as atypical ductal hyperplasia of the left breast and also a strong family history.  She is on tamoxifen for prevention.  She is tolerating it well.  I am happy with how things are going for her.  I will see her back in 6 months.    Aubrie Barnes MD        D: 2022   T: 2022   MT: WANDA    Name:     VIGNESH MCGILL  MRN:      [id number]        Account:    [id number]   :      1965           Visit Date: 2022     Document: [id number]

## 2022-08-03 ENCOUNTER — TELEPHONE (OUTPATIENT)
Dept: FAMILY MEDICINE | Facility: CLINIC | Age: 57
End: 2022-08-03

## 2022-08-03 NOTE — TELEPHONE ENCOUNTER
Reason for Call:  Other call back    Detailed comments: Brii called she had seen Dr. Orozco in April for foot pain and it has not gotten better even with home care. She would like to have an x-ray done. Does she need to be seen again or can Dr. Orozco just place a referral? Please call and discuss with patient.    Phone Number Patient can be reached at: Cell number on file:    Telephone Information:   Mobile [phone]       Best Time: Anytime    Can we leave a detailed message on this number? YES    Call taken on 8/3/2022 at 2:41 PM by Dixie Bray

## 2022-08-03 NOTE — TELEPHONE ENCOUNTER
"Spoke with pt, states pain is in medial right foot. Per pt, Dr. Orozco advised to use ice and massage at [facility] in April. Pt states \"it hurts to massage the area but it feels better for a little bit afterwards and then the pain comes back\". Pain is staying about the same. Pt denies taking Tylenol or Ibuprofen specifically for foot pain.     Scheduled with Dr. Orozco for August 26th @ 1040. Pt will continue with home cares until appt.    Lucy Briggs RN    "

## 2022-08-26 ENCOUNTER — ANCILLARY PROCEDURE (OUTPATIENT)
Dept: GENERAL RADIOLOGY | Facility: CLINIC | Age: 57
End: 2022-08-26
Attending: FAMILY MEDICINE
Payer: COMMERCIAL

## 2022-08-26 ENCOUNTER — OFFICE VISIT (OUTPATIENT)
Dept: FAMILY MEDICINE | Facility: CLINIC | Age: 57
End: 2022-08-26

## 2022-08-26 VITALS
DIASTOLIC BLOOD PRESSURE: 66 MMHG | BODY MASS INDEX: 24.99 KG/M2 | RESPIRATION RATE: 14 BRPM | HEART RATE: 67 BPM | WEIGHT: 150 LBS | HEIGHT: 65 IN | SYSTOLIC BLOOD PRESSURE: 117 MMHG | TEMPERATURE: 97.9 F

## 2022-08-26 DIAGNOSIS — Z23 ENCOUNTER FOR IMMUNIZATION: ICD-10-CM

## 2022-08-26 DIAGNOSIS — M79.671 RIGHT FOOT PAIN: Primary | ICD-10-CM

## 2022-08-26 DIAGNOSIS — M79.671 RIGHT FOOT PAIN: ICD-10-CM

## 2022-08-26 PROCEDURE — 90750 HZV VACC RECOMBINANT IM: CPT | Performed by: FAMILY MEDICINE

## 2022-08-26 PROCEDURE — 99214 OFFICE O/P EST MOD 30 MIN: CPT | Mod: 25 | Performed by: FAMILY MEDICINE

## 2022-08-26 PROCEDURE — 90471 IMMUNIZATION ADMIN: CPT | Performed by: FAMILY MEDICINE

## 2022-08-26 PROCEDURE — 73630 X-RAY EXAM OF FOOT: CPT | Mod: TC | Performed by: RADIOLOGY

## 2022-08-26 NOTE — PROGRESS NOTES
"  Assessment & Plan     Right foot pain - negative XR, discussed likely soft tissue. Given ongoing symptoms for the past 10 months, advised advanced imaging and podiatry consult.   - XR Foot Right G/E 3 Views; Future  - MR Foot Right w/o Contrast; Future  - Orthopedic  Referral; Future    Encounter for immunization  - ZOSTER VACCINE RECOMBINANT ADJUVANTED IM NJX    Return in about 1 year (around 8/26/2023) for as needed.    Chelly Orozco MD  [facility] XIN Teresa is a 56 year old, presenting for the following health issues:  Musculoskeletal Problem and Ear Problem      Musculoskeletal Problem    History of Present Illness       Reason for visit:  Continued foot pain, 2nd shingles shot, ear check    She eats 2-3 servings of fruits and vegetables daily.She consumes 0 sweetened beverage(s) daily.She exercises with enough effort to increase her heart rate 30 to 60 minutes per day.  She exercises with enough effort to increase her heart rate 4 days per week.   She is taking medications regularly.           Review of Systems   Constitutional, HEENT, cardiovascular, pulmonary, gi and gu systems are negative, except as otherwise noted.      Objective    /66 (BP Location: Right arm, Patient Position: Sitting, Cuff Size: Adult Regular)   Pulse 67   Temp 97.9  F (36.6  C) (Oral)   Resp 14   Ht 1.645 m (5' 4.75\")   Wt 68 kg (150 lb)   LMP 09/04/2019 (LMP Unknown)   Breastfeeding No   BMI 25.15 kg/m    Body mass index is 25.15 kg/m .  Physical Exam   GENERAL: healthy, alert and no distress  HENT: ear canals and TM's normal, nose and mouth without ulcers or lesions  NECK: no adenopathy, no asymmetry, masses, or scars and thyroid normal to palpation  MS: ttp overlying the posterior tibial tendon, behind the medial malleolus, no swellling, normal ROM    Right foot XR - negative            .  ..  "

## 2022-09-11 ENCOUNTER — HOSPITAL ENCOUNTER (OUTPATIENT)
Dept: MRI IMAGING | Facility: CLINIC | Age: 57
Discharge: HOME | End: 2022-09-11
Attending: FAMILY MEDICINE | Admitting: FAMILY MEDICINE
Payer: COMMERCIAL

## 2022-09-11 DIAGNOSIS — M79.671 RIGHT FOOT PAIN: ICD-10-CM

## 2022-09-11 PROCEDURE — 73718 MRI LOWER EXTREMITY W/O DYE: CPT | Mod: RT

## 2022-09-16 ENCOUNTER — OFFICE VISIT (OUTPATIENT)
Dept: PODIATRY | Facility: CLINIC | Age: 57
End: 2022-09-16
Payer: COMMERCIAL

## 2022-09-16 VITALS — SYSTOLIC BLOOD PRESSURE: 118 MMHG | BODY MASS INDEX: 25.15 KG/M2 | WEIGHT: 150 LBS | DIASTOLIC BLOOD PRESSURE: 72 MMHG

## 2022-09-16 DIAGNOSIS — M79.671 RIGHT FOOT PAIN: ICD-10-CM

## 2022-09-16 DIAGNOSIS — M72.2 PLANTAR FASCIITIS, RIGHT: Primary | ICD-10-CM

## 2022-09-16 DIAGNOSIS — Q66.70 PES CAVUS: ICD-10-CM

## 2022-09-16 PROCEDURE — 99203 OFFICE O/P NEW LOW 30 MIN: CPT | Performed by: PODIATRIST

## 2022-09-16 RX ORDER — METHYLPREDNISOLONE 4 MG
TABLET, DOSE PACK ORAL
Qty: 21 TABLET | Refills: 0 | Status: SHIPPED | OUTPATIENT
Start: 2022-09-16 | End: 2023-06-23

## 2022-09-16 NOTE — PROGRESS NOTES
PATIENT HISTORY:  Dr. Orozco requested I see this patient for their foot issue.  Brii Taylor is a 56 year old female who presents to clinic for right foot pain.  She notes its been going on for about 10 months.  She fell and her foot jammed on something hard.  They thought it was just bruising but the pain has continued.  Pain can be 7 out of 10 at its worst.  Usually when she gets up from sitting or if she is on her feet for quite a while.  She had massage to the area she has been trying to ice.  She did have x-rays and an MRI of the foot.  The MRI showed arthritis of the great toe.  She is here to follow-up to see what else can be done for the right foot pain.    Review of Systems:  Patient denies fever, chills, rash, wound, stiffness,  numbness, weakness, heart burn, blood in stool, chest pain with activity, calf pain when walking, shortness of breath with activity, chronic cough, easy bleeding/bruising, swelling of ankles, excessive thirst, fatigue, depression, anxiety.  Patient admits to limping at times..     PAST MEDICAL HISTORY:   Past Medical History:   Diagnosis Date     Anxiety     sees psychiatrist     Arthritis      Heart murmur      Migraine, unspecified, without mention of intractable migraine without mention of status migrainosus      Sleep apnea      Sleep disturbance, unspecified     hx sleep apnea     Viral warts, unspecified         PAST SURGICAL HISTORY:   Past Surgical History:   Procedure Laterality Date     BIOPSY BREAST SEED LOCALIZATION Left 1/24/2020    Procedure: LEFT BREAST SEED LOCALIZED EXCISIONAL BIOPSY;  Surgeon: Cristi Arndt MD;  Location:  OR     COLONOSCOPY Left 12/18/2017    Procedure: COLONOSCOPY;  Colonoscopy; difficulty in advancing scope (tight corner) so used biopsy forcep to follow/ advance scope;  Surgeon: Babar Gramajo MD;  Location:  GI     TONSILLECTOMY          MEDICATIONS:   Current Outpatient Medications:      acetaminophen-caffeine (EXCEDRIN  TENSION HEADACHE) 500-65 MG TABS, Take 2 tablets by mouth every 6 hours as needed for mild pain, Disp: , Rfl:      clonazePAM (KLONOPIN) 0.5 MG tablet, TK 1 T PO D PRA, Disp: , Rfl: 1     sertraline (ZOLOFT) 100 MG tablet, Take 1 tablet (100 mg) by mouth daily, Disp: 90 tablet, Rfl: 1     tamoxifen (NOLVADEX) 20 MG tablet, Take 1 tablet (20 mg) by mouth daily, Disp: 90 tablet, Rfl: 3     ALLERGIES:    Allergies   Allergen Reactions     Penicillins      PN: LW Reaction: rash        SOCIAL HISTORY:   Social History     Socioeconomic History     Marital status:      Spouse name: Not on file     Number of children: Not on file     Years of education: Not on file     Highest education level: Not on file   Occupational History     Not on file   Tobacco Use     Smoking status: Never Smoker     Smokeless tobacco: Never Used   Vaping Use     Vaping Use: Never used   Substance and Sexual Activity     Alcohol use: No     Drug use: No     Sexual activity: Yes     Partners: Male   Other Topics Concern     Parent/sibling w/ CABG, MI or angioplasty before 65F 55M? Not Asked   Social History Narrative     Not on file     Social Determinants of Health     Financial Resource Strain: Low Risk      Difficulty of Paying Living Expenses: Not hard at all   Food Insecurity: No Food Insecurity     Worried About Running Out of Food in the Last Year: Never true     Ran Out of Food in the Last Year: Never true   Transportation Needs: No Transportation Needs     Lack of Transportation (Medical): No     Lack of Transportation (Non-Medical): No   Physical Activity: Insufficiently Active     Days of Exercise per Week: 3 days     Minutes of Exercise per Session: 20 min   Stress: No Stress Concern Present     Feeling of Stress : Not at all   Social Connections: Unknown     Frequency of Communication with Friends and Family: Twice a week     Frequency of Social Gatherings with Friends and Family: Once a week     Attends Evangelical Services:  Patient refused     Active Member of Clubs or Organizations: No     Attends Club or Organization Meetings: Not on file     Marital Status:    Intimate Partner Violence: Not At Risk     Fear of Current or Ex-Partner: No     Emotionally Abused: No     Physically Abused: No     Sexually Abused: No   Housing Stability: Low Risk      Unable to Pay for Housing in the Last Year: No     Number of Places Lived in the Last Year: 1     Unstable Housing in the Last Year: No        FAMILY HISTORY:   Family History   Problem Relation Age of Onset     Hypertension Mother      Cancer Mother 70        nonHodgkins lymphoma     Psychotic Disorder Mother         anxiety     Alzheimer Disease Father      Breast Cancer Sister         breast ca age 38     Gastrointestinal Disease Brother         crohns or colitis     No Known Problems Daughter      Colon Cancer No family hx of      Ovarian Cancer No family hx of         EXAM:Vitals: /72   Wt 68 kg (150 lb)   LMP 09/04/2019 (LMP Unknown)   BMI 25.15 kg/m    BMI= Body mass index is 25.15 kg/m .      General appearance: Patient is alert and fully cooperative with history & exam.  No sign of distress is noted during the visit.     Psychiatric: Affect is pleasant & appropriate.  Patient appears motivated to improve health.     Respiratory: Breathing is regular & unlabored while sitting.     HEENT: Hearing is intact to spoken word.  Speech is clear.  No gross evidence of visual impairment that would impact ambulation.     Dermatologic: Skin is intact to both lower extremities without significant lesions, rash or abrasion.  No paronychia or evidence of soft tissue infection is noted.     Vascular: DP & PT pulses are intact & regular bilaterally.  No significant edema or varicosities noted.  CFT and skin temperature is normal to both lower extremities.     Neurologic: Lower extremity sensation is intact to light touch.  No evidence of weakness or contracture in the lower  extremities.  No evidence of neuropathy.     Musculoskeletal: Patient is ambulatory without assistive device or brace.  Pain on palpation of the plantar medial right heel.    Radiographs:  I personally reviewed the xrays. No fractures are identified. Mild hypertrophic change of  the first MTP joint. The remaining joint spaces are unremarkable.     Right foot MRI:  1.  Mild degenerative changes of the first MTP joint.     ASSESSMENT:     Right foot pain  Plantar fasciitis, right  Pes cavus     Medical Decision Making/Plan:  Reviewed patient's chart in [facility].  Reviewed and discussed MRI and x-ray results with patient.  Unfortunately I feel she needed an MRI of the ankle as that which showed the heel where she is actually having the pain and MRI of the foot only stops at the midfoot area.  We will reorder an MRI of the ankle to assess for plantar fascial tear versus continued stress fracture of the heel.  If there is no tear or fracture we could try cortisone injection in clinic or possible physical therapy with iontophoresis.  If there is a tear may discuss physical therapy versus surgery.  We will call her with the MRI results.  We will also order a stronger oral anti-inflammatory at this time and inflammation.  All questions were answered to patient's satisfaction she will call further questions or concerns.    Patient risk factor: Patient is at low risk for infection.        Belgica Nazario DPM, Podiatry/Foot and Ankle Surgery

## 2022-09-16 NOTE — LETTER
9/16/2022         RE: Brii Taylor  [address] [id number]        Dear Colleague,    Thank you for referring your patient, Brii Taylor, to the [facility]. Please see a copy of my visit note below.    PATIENT HISTORY:  Dr. Orozco requested I see this patient for their foot issue.  Brii Taylor is a 56 year old female who presents to clinic for right foot pain.  She notes its been going on for about 10 months.  She fell and her foot jammed on something hard.  They thought it was just bruising but the pain has continued.  Pain can be 7 out of 10 at its worst.  Usually when she gets up from sitting or if she is on her feet for quite a while.  She had massage to the area she has been trying to ice.  She did have x-rays and an MRI of the foot.  The MRI showed arthritis of the great toe.  She is here to follow-up to see what else can be done for the right foot pain.    Review of Systems:  Patient denies fever, chills, rash, wound, stiffness,  numbness, weakness, heart burn, blood in stool, chest pain with activity, calf pain when walking, shortness of breath with activity, chronic cough, easy bleeding/bruising, swelling of ankles, excessive thirst, fatigue, depression, anxiety.  Patient admits to limping at times..     PAST MEDICAL HISTORY:   Past Medical History:   Diagnosis Date     Anxiety     sees psychiatrist     Arthritis      Heart murmur      Migraine, unspecified, without mention of intractable migraine without mention of status migrainosus      Sleep apnea      Sleep disturbance, unspecified     hx sleep apnea     Viral warts, unspecified         PAST SURGICAL HISTORY:   Past Surgical History:   Procedure Laterality Date     BIOPSY BREAST SEED LOCALIZATION Left 1/24/2020    Procedure: LEFT BREAST SEED LOCALIZED EXCISIONAL BIOPSY;  Surgeon: Cristi Arndt MD;  Location: [facility] OR     COLONOSCOPY Left 12/18/2017    Procedure: COLONOSCOPY;  Colonoscopy;  difficulty in advancing scope (tight corner) so used biopsy forcep to follow/ advance scope;  Surgeon: Babar Gramajo MD;  Location:  GI     TONSILLECTOMY          MEDICATIONS:   Current Outpatient Medications:      acetaminophen-caffeine (EXCEDRIN TENSION HEADACHE) 500-65 MG TABS, Take 2 tablets by mouth every 6 hours as needed for mild pain, Disp: , Rfl:      clonazePAM (KLONOPIN) 0.5 MG tablet, TK 1 T PO D PRA, Disp: , Rfl: 1     sertraline (ZOLOFT) 100 MG tablet, Take 1 tablet (100 mg) by mouth daily, Disp: 90 tablet, Rfl: 1     tamoxifen (NOLVADEX) 20 MG tablet, Take 1 tablet (20 mg) by mouth daily, Disp: 90 tablet, Rfl: 3     ALLERGIES:    Allergies   Allergen Reactions     Penicillins      PN: LW Reaction: rash        SOCIAL HISTORY:   Social History     Socioeconomic History     Marital status:      Spouse name: Not on file     Number of children: Not on file     Years of education: Not on file     Highest education level: Not on file   Occupational History     Not on file   Tobacco Use     Smoking status: Never Smoker     Smokeless tobacco: Never Used   Vaping Use     Vaping Use: Never used   Substance and Sexual Activity     Alcohol use: No     Drug use: No     Sexual activity: Yes     Partners: Male   Other Topics Concern     Parent/sibling w/ CABG, MI or angioplasty before 65F 55M? Not Asked   Social History Narrative     Not on file     Social Determinants of Health     Financial Resource Strain: Low Risk      Difficulty of Paying Living Expenses: Not hard at all   Food Insecurity: No Food Insecurity     Worried About Running Out of Food in the Last Year: Never true     Ran Out of Food in the Last Year: Never true   Transportation Needs: No Transportation Needs     Lack of Transportation (Medical): No     Lack of Transportation (Non-Medical): No   Physical Activity: Insufficiently Active     Days of Exercise per Week: 3 days     Minutes of Exercise per Session: 20 min   Stress: No Stress  Concern Present     Feeling of Stress : Not at all   Social Connections: Unknown     Frequency of Communication with Friends and Family: Twice a week     Frequency of Social Gatherings with Friends and Family: Once a week     Attends Gnosticist Services: Patient refused     Active Member of Clubs or Organizations: No     Attends Club or Organization Meetings: Not on file     Marital Status:    Intimate Partner Violence: Not At Risk     Fear of Current or Ex-Partner: No     Emotionally Abused: No     Physically Abused: No     Sexually Abused: No   Housing Stability: Low Risk      Unable to Pay for Housing in the Last Year: No     Number of Places Lived in the Last Year: 1     Unstable Housing in the Last Year: No        FAMILY HISTORY:   Family History   Problem Relation Age of Onset     Hypertension Mother      Cancer Mother 70        nonHodgkins lymphoma     Psychotic Disorder Mother         anxiety     Alzheimer Disease Father      Breast Cancer Sister         breast ca age 38     Gastrointestinal Disease Brother         crohns or colitis     No Known Problems Daughter      Colon Cancer No family hx of      Ovarian Cancer No family hx of         EXAM:Vitals: /72   Wt 68 kg (150 lb)   LMP 09/04/2019 (LMP Unknown)   BMI 25.15 kg/m    BMI= Body mass index is 25.15 kg/m .      General appearance: Patient is alert and fully cooperative with history & exam.  No sign of distress is noted during the visit.     Psychiatric: Affect is pleasant & appropriate.  Patient appears motivated to improve health.     Respiratory: Breathing is regular & unlabored while sitting.     HEENT: Hearing is intact to spoken word.  Speech is clear.  No gross evidence of visual impairment that would impact ambulation.     Dermatologic: Skin is intact to both lower extremities without significant lesions, rash or abrasion.  No paronychia or evidence of soft tissue infection is noted.     Vascular: DP & PT pulses are intact &  regular bilaterally.  No significant edema or varicosities noted.  CFT and skin temperature is normal to both lower extremities.     Neurologic: Lower extremity sensation is intact to light touch.  No evidence of weakness or contracture in the lower extremities.  No evidence of neuropathy.     Musculoskeletal: Patient is ambulatory without assistive device or brace.  Pain on palpation of the plantar medial right heel.    Radiographs:  I personally reviewed the xrays. No fractures are identified. Mild hypertrophic change of  the first MTP joint. The remaining joint spaces are unremarkable.     Right foot MRI:  1.  Mild degenerative changes of the first MTP joint.     ASSESSMENT:     Right foot pain  Plantar fasciitis, right  Pes cavus     Medical Decision Making/Plan:  Reviewed patient's chart in [facility].  Reviewed and discussed MRI and x-ray results with patient.  Unfortunately I feel she needed an MRI of the ankle as that which showed the heel where she is actually having the pain and MRI of the foot only stops at the midfoot area.  We will reorder an MRI of the ankle to assess for plantar fascial tear versus continued stress fracture of the heel.  If there is no tear or fracture we could try cortisone injection in clinic or possible physical therapy with iontophoresis.  If there is a tear may discuss physical therapy versus surgery.  We will call her with the MRI results.  We will also order a stronger oral anti-inflammatory at this time and inflammation.  All questions were answered to patient's satisfaction she will call further questions or concerns.    Patient risk factor: Patient is at low risk for infection.        Belgica Nazario DPM, Podiatry/Foot and Ankle Surgery        Again, thank you for allowing me to participate in the care of your patient.        Sincerely,        Belgica Nazario DPM, Podiatry/Foot and Ankle Surgery

## 2022-09-16 NOTE — PATIENT INSTRUCTIONS
Thank you for choosing [facility] Podiatry / Foot & Ankle Surgery!    DR BAXTER'S CLINIC:  [facility]   [address]   [place] [address]      TRIAGE LINE: [phone]  APPOINTMENTS: [phone]  RADIOLOGY: [phone]  SET UP SURGERY: [phone]  FAX NUMBER: [phone]  BILLING QUESTIONS: [phone]       Follow up: We will call you with MRI results.       oral anti-inflammatories and take as directed.    Call to schedule MRI of the ankle at number below: P [phone].    PLANTAR FASCIITIS  Plantar fasciitis is often referred to as heel spurs or heel pain. Plantar fasciitis is a very common problem that affects people of all foot shapes, age, weight and activity level. Pain may be in the arch or on the weight-bearing surface of the heel. The pain may come on without injury or identifiable cause. Pain is generally present when first getting out of bed in the morning or up from a seated break.     CAUSES  The plantar fascia is a dense fibrous band of tissue that stretches across the bottom surface of the foot. The fascia helps support the foot muscles and arch. Plantar fasciitis is thought to be caused by mechanical strain or overload. Frequent walking without shoes or wearing unsupportive shoes is thought to cause structural overload and ultimately inflammation of the plantar fascia. Some people have heel spurs that can be seen on x-ray. The heel spur is actually a minor component of plantar fascitis and is largely ignored.       SELF TREATMENT   The easiest solution is to stop walking around your home without shoes. Plantar fasciitis is largely a shoe problem. Shoes are either not being worn often enough or your current shoes are inadequate for your weight, foot structure or activity level. The majority of shoes on the market today are not sufficient to resist development of plantar fasciitis or to promote healing. Assume that your current shoes are inadequate  and will need to be replaced. Even high quality shoes wear out with 6 months to one year of frequent use. Weight loss is another option. Losing ten pounds in the next two months may be enough to resolve the problem. Ice applied to the area of pain two to three times per day for ten minutes each session can be very helpful. Warm foot soaks in epsom salts can also relieve pain. This should continue until the problem resolves. Achilles tendon stretching is essential. Stretch multiple times daily to promote healing and to prevent recurrence in the future. Over all stretching of the body is helpful as well such as the calves, thighs and lower back. Normally when one area of the body is tight, other areas are too. Gentle Yoga can be good for this.     Over the counter topical anti inflammatories can be helpful such as biofreeze, bengay, salon pas, ect...  Oral ibuprofen or aleve is recommended as well to try to calm down inflammation.     Night splints can be helpful to gradually stretch the foot at night as a lot of pain is when you get up in the morning. Taking a towel or thera band and stretching the foot back multiple times before you get ou of bed can be beneficial as well.     MEDICAL TREATMENT  Medical treatments often include custom arch supports, cortisone injections, physical therapy, splints to be worn in bed, prescription medications and surgery. The home treatments listed above will be necessary regardless of these advanced medical treatments. Surgery is rarely needed but is very helpful in selected cases.     PROGNOSIS  Plantar fasciitis can last from one day to a lifetime. Some people get intermittent fascitis that is very short-lived. Others suffer daily for years. Excessive body weight, frequent bare foot walking, long hours on the feet, inadequate shoes, predisposing foot structures and excessive activity such as running are all potential issues that lead to chronic and/or recurring plantar fascitis.  Having plantar fasciitis means that you are forever prone to this problem and will require modification of some of the above factors. Most people seek treatment within one to four months. Healing usually requires a similar one to four month time frame. Healing time is relative to the amount of effort spent treating the problem.   Plantar fasciitis is highly recurrent. Risk factors often continue, including return to bare foot walking, inadequate shoes, excessive body weight, excessive activities, etc. Your life style and foot structure may predispose you to recurrent plantar fasciitis. A daily prevention regimen can be very helpful. Ongoing use of shoe inserts, careful attention to appropriate shoes, daily Achilles stretching, etc. may prevent recurrence. Prompt attention at the earliest warning signs of heel pain can resolve the problem in as short as a few days.     EXERCISES  Stair Exercise: Step on the stairs with the ball of your foot and hold your position for at least 15 seconds, then slowly step down with the heels of your foot. You can do this daily and as often as you want.   Picking the Towel: Sit comfortably and then pick the towel up with your toes. You can use any object other than a towel as long as the material can be soft and you can pick it up with your toes.  Rolling the Bottle: Use a small ball or frozen water bottle and then roll it around with your foot.   Flex the Toes: Sit comfortably and then flex your toes by pointing it towards the floor or towards your body. This will relax and flex your foot and exercise your plantar fascia, the calf, and the Achilles tendon. The inability of the foot to stretch often causes the bunching up of the plantar fascia area leading to the pain.  Calf/Achilles Stretching: Lay on you back and raise one foot, then point your toes towards the floor. See photo below:               Hold each stretch for 10 seconds. Stretch 10 times per set, three sets per day.  Morning, afternoon and evening. If your heel pain is very severe in the morning, consider doing the first set of stretches before you get out of bed.      OVER THE COUNTER INSERT RECOMMENDATIONS  SuperFeet   Sofsole Fit Spenco   Power Step   Walk-Fit Arch Cradles     Most of these can be found at your local [employer], [employer], or online.  **A good high quality over the counter insert should cost around $40-$50      [employer] LOCATIONS  [place]  [address] [place] [address]  [phone]   [address]  [phone] [place]  [address] [place] [address]  [phone]   [place]  [address]  [phone]   [place]  [address]  [phone] [place] [address]  [phone]   [place]  [address]  [phone]   [place]  [address]  [phone] [place]  [address]  [phone]       Thank you for choosing [facility] Podiatry / Foot & Ankle Surgery!

## 2022-10-03 DIAGNOSIS — F41.9 ANXIETY: ICD-10-CM

## 2022-10-03 RX ORDER — SERTRALINE HYDROCHLORIDE 100 MG/1
100 TABLET, FILM COATED ORAL DAILY
Qty: 90 TABLET | Refills: 1 | Status: SHIPPED | OUTPATIENT
Start: 2022-10-03 | End: 2023-06-23

## 2022-10-03 NOTE — TELEPHONE ENCOUNTER
Pending Prescriptions:                       Disp   Refills    sertraline (ZOLOFT) 100 MG tablet         90 tab*1            Sig: Take 1 tablet (100 mg) by mouth daily          Last Written Prescription Date:  2/21/22  Last Fill Quantity: 90,   # refills: 1  Last Office Visit: 7/27/22  Future Office visit: 2/21/23    Routing refill request to provider for review/approval.    Melissa Ruff, RN, BSN, OCN  Nurse Care Coordinator  [facility]  P: [phone]     F: [phone]

## 2022-10-03 NOTE — TELEPHONE ENCOUNTER
Signed Prescriptions:                        Disp   Refills    sertraline (ZOLOFT) 100 MG tablet          90 tab*1        Sig: Take 1 tablet (100 mg) by mouth daily  Authorizing Provider: BRENDA SWANSON, RN, BSN, OCN  Nurse Care Coordinator  [facility]  P: [phone]     F: [phone]

## 2022-10-04 ENCOUNTER — HOSPITAL ENCOUNTER (OUTPATIENT)
Dept: MRI IMAGING | Facility: CLINIC | Age: 57
Discharge: HOME | End: 2022-10-04
Attending: PODIATRIST | Admitting: PODIATRIST
Payer: COMMERCIAL

## 2022-10-04 DIAGNOSIS — M72.2 PLANTAR FASCIITIS, RIGHT: ICD-10-CM

## 2022-10-04 DIAGNOSIS — Q66.70 PES CAVUS: ICD-10-CM

## 2022-10-04 DIAGNOSIS — M79.671 RIGHT FOOT PAIN: ICD-10-CM

## 2022-10-04 PROCEDURE — 73721 MRI JNT OF LWR EXTRE W/O DYE: CPT | Mod: 26 | Performed by: RADIOLOGY

## 2022-10-04 PROCEDURE — 73721 MRI JNT OF LWR EXTRE W/O DYE: CPT | Mod: RT

## 2022-10-05 ENCOUNTER — TELEPHONE (OUTPATIENT)
Dept: PODIATRY | Facility: CLINIC | Age: 57
End: 2022-10-05

## 2022-10-05 NOTE — TELEPHONE ENCOUNTER
MRI right ankle shows plantar fasciitis without a tear. No stress fracture. Could try a cortisone injection in clinic or physical therapy with iontophoresis.     Please let patient know.     Thanks,     Belgica Nazario DPM

## 2022-10-06 ENCOUNTER — TELEPHONE (OUTPATIENT)
Dept: INTERNAL MEDICINE | Facility: CLINIC | Age: 57
End: 2022-10-06

## 2022-10-06 NOTE — TELEPHONE ENCOUNTER
Phone call to patient and she was given MRI results and recommendations. Sent [employer] message regarding iontophoresis information so she can check with her insurance.   She asked if one option of physical therapy vs injection was better than the other. Discussed that each person is different and can vary.     She will call back to let us know what she would like to do after checking with insurance and verbalized understanding.     IMAN Wiley RN

## 2022-10-06 NOTE — TELEPHONE ENCOUNTER
There is a consent to communicate on file for home and cell numbers.     Left voicemail on cell asking for a return call regarding MRI results.     IMAN Wiley RN

## 2022-10-06 NOTE — TELEPHONE ENCOUNTER
[facility] Call Center    Phone Message    May a detailed message be left on voicemail: yes     Reason for Call: Other: Returning Julissa's call please call back anytime after 1.     Action Taken: Other: FSOC [employer] Sport    Travel Screening: Not Applicable

## 2022-10-09 ENCOUNTER — HEALTH MAINTENANCE LETTER (OUTPATIENT)
Age: 57
End: 2022-10-09

## 2022-10-24 ENCOUNTER — TELEPHONE (OUTPATIENT)
Dept: PODIATRY | Facility: CLINIC | Age: 57
End: 2022-10-24

## 2022-10-24 NOTE — TELEPHONE ENCOUNTER
[facility] Call Center    Phone Message    May a detailed message be left on voicemail: yes     Reason for Call: Other: pt calling in re: getting 2nd MRI ordered by dr. mak.  Pt advising insurance will not cover and needs more clarification to the reason on why another MRI was needed.  Pt would like call back #[phone]     Action Taken: Message routed to:  [facility] Dr. Belgica Mak    Travel Screening: Not Applicable

## 2022-10-24 NOTE — LETTER
[facility] PODIATRY  [address]  [facility] [address]  [phone]    Employee Name: Brii Taylor        : 1965         Today's date: 10/27/2022    To whom it may concern:    Patient was seen by me in clinic on 2022.  She had a previous MRI of the foot ordered due to longstanding heel pain over 10 months.  However the ankle MRI should have been ordered.  We ordered this to try to assess further pathology given her longstanding pain over 10 months.  She had tried 10 months of stretching and new shoes in a boot but pain continued.  Ordered the MRI of the ankle which was appropriate to assess for possible tearing of the plantar fascia of the heel.  This was medically necessary to assess for this.  Please cover this imaging.            Belgica Nazario DPM

## 2022-10-26 ENCOUNTER — TELEPHONE (OUTPATIENT)
Dept: PODIATRY | Facility: CLINIC | Age: 57
End: 2022-10-26

## 2022-10-26 NOTE — TELEPHONE ENCOUNTER
[facility] Call Center    Phone Message    May a detailed message be left on voicemail: yes     Reason for Call: Other: Patient would like a call back to discuss next step.     Action Taken: Other: Podiatry    Travel Screening: Not Applicable

## 2022-10-26 NOTE — TELEPHONE ENCOUNTER
Phone call to patient.   She states she fell in Dec 2021 and thought she had just bruised her foot. she did not seek care for it until 4/14/22 with her PCP.  She tried some ice and massage that wasn't helping. She then saw her PCP again who ordered an xray on 8/26/22. A right foot MRI was ordered at that time and she was referred to Dr. Nazario.   Patient saw Dr. Nazario on 9/16/22 and it was determined that the incorrect MRI was ordered and should have been of her ankle instead due to the location of her pain in the arch towards her heel and above her ankle. A new MRI of the right ankle was ordered.     Her insurance is now denying it stating patient needed to try some treatment for 6 weeks first that showed she did not have improvement.     Patient would like any assistance from Dr. Nazario to try to get this MRI covered.   Asked that she mail a copy of the denial to Dr. Nazario and she can take a look to see if there is anything she is able to offer. Also discussed that sometimes we aren't able to provide information that helps get it covered with insurance.     She will mail the denial and we will get back with her once Dr. Nazario reviews it.     Keeping encounter open for when denial is received.     IMAN Wiley RN

## 2022-10-27 ENCOUNTER — TELEPHONE (OUTPATIENT)
Dept: FAMILY MEDICINE | Facility: CLINIC | Age: 57
End: 2022-10-27

## 2022-10-27 NOTE — TELEPHONE ENCOUNTER
Can try to write a letter of medical necessity to send to the insurance.  ( I wrote it and if you can print it off and send it to the insurance company).     That is about all that I can do.    Hopefully it will help.    Please let patient know and please print and send letter.     Thanks,    Belgica Nazario DPM

## 2022-10-27 NOTE — TELEPHONE ENCOUNTER
Patient calling regarding imaging from 10/4/22 and 9/11/22. Patient reports the imaging done on 9/11/22 did not get an image of where her pain was, so her podiatrist ordered additional imaging on 10/4/22. Patient reports her insurance is now not covering this imaging. Patient wondering if she should speak with primary care provider or billing office?

## 2022-11-02 NOTE — TELEPHONE ENCOUNTER
LMTC . Just checking that patient was able to print off the letter from My Chart that Dr. Nazario wrote and submit to insurance.  Katie BOYLE M.A.

## 2022-11-04 ENCOUNTER — TELEPHONE (OUTPATIENT)
Dept: PODIATRY | Facility: CLINIC | Age: 57
End: 2022-11-04

## 2022-11-04 NOTE — TELEPHONE ENCOUNTER
Received letter of denial for mri of ankle.     Faxed over a letter of medical necessity to the evicore for reconsideration.     Unfortunately that is all I can do.     Belgica Nazario DPM

## 2022-11-09 ENCOUNTER — VIRTUAL VISIT (OUTPATIENT)
Dept: SLEEP MEDICINE | Facility: CLINIC | Age: 57
End: 2022-11-09
Payer: COMMERCIAL

## 2022-11-09 VITALS — WEIGHT: 148 LBS | HEIGHT: 66 IN | BODY MASS INDEX: 23.78 KG/M2

## 2022-11-09 DIAGNOSIS — G47.33 OSA (OBSTRUCTIVE SLEEP APNEA): Primary | ICD-10-CM

## 2022-11-09 PROCEDURE — 99214 OFFICE O/P EST MOD 30 MIN: CPT | Mod: GT | Performed by: INTERNAL MEDICINE

## 2022-11-09 ASSESSMENT — SLEEP AND FATIGUE QUESTIONNAIRES
HOW LIKELY ARE YOU TO NOD OFF OR FALL ASLEEP WHILE SITTING INACTIVE IN A PUBLIC PLACE: SLIGHT CHANCE OF DOZING
HOW LIKELY ARE YOU TO NOD OFF OR FALL ASLEEP WHEN YOU ARE A PASSENGER IN A CAR FOR AN HOUR WITHOUT A BREAK: HIGH CHANCE OF DOZING
HOW LIKELY ARE YOU TO NOD OFF OR FALL ASLEEP WHILE SITTING AND READING: MODERATE CHANCE OF DOZING
HOW LIKELY ARE YOU TO NOD OFF OR FALL ASLEEP WHILE SITTING QUIETLY AFTER LUNCH WITHOUT ALCOHOL: SLIGHT CHANCE OF DOZING
HOW LIKELY ARE YOU TO NOD OFF OR FALL ASLEEP WHILE LYING DOWN TO REST IN THE AFTERNOON WHEN CIRCUMSTANCES PERMIT: HIGH CHANCE OF DOZING
HOW LIKELY ARE YOU TO NOD OFF OR FALL ASLEEP WHILE SITTING AND TALKING TO SOMEONE: SLIGHT CHANCE OF DOZING
HOW LIKELY ARE YOU TO NOD OFF OR FALL ASLEEP WHILE WATCHING TV: MODERATE CHANCE OF DOZING
HOW LIKELY ARE YOU TO NOD OFF OR FALL ASLEEP IN A CAR, WHILE STOPPED FOR A FEW MINUTES IN TRAFFIC: SLIGHT CHANCE OF DOZING

## 2022-11-09 NOTE — PROGRESS NOTES
Brii is a 57 year old who is being evaluated via a billable video visit.      How would you like to obtain your AVS? MyChart  If the video visit is dropped, the invitation should be resent by: Text to cell phone: [phone]  Will anyone else be joining your video visit? Venus Willams        Video-Visit Details    Video Start Time: 211pm  Type of service:  Video Visit    Video End Time:239pm    Originating Location (pt. Location): Home        Distant Location (provider location):  Off-site    Platform used for Video Visit: [facility]  Sleep clinic follow-up visit note      Date on this visit: 11/9/22     Chief complaint: f/u JUAN ALBERTO     Brii Taylor is a very pleasant 57 year old female with obstructive sleep apnea, that is currently being managed with oral appliance.  She presents to sleep clinic for a video visit today for follow-up of JUAN ALBERTO.  She was last seen at the sleep clinic in August 2020.   Home sleep study obtained on 8/12/2020 showed evidence of moderate JUAN ALBERTO, pronounced during supine position without sleep associated hypoxemia.  Following the sleep study she was recommended combination of oral appliance and positional therapy during sleep, but she has only has been using the oral appliance.  She was using oral appliance even before she presented to the sleep clinic in August 2020.  After the sleep clinic visit and the sleep study obtained in August 2020, she has been following up [place] dentistry at [place] [facility] [place].  She reports that she can't get the oral appliance to fit quite right. The  bottom part keeps popping out causing her to wake up during the night.  She now needs to switch to a different dentist for insurance reasons.  She reports using the oral appliance regularly during sleep.  She reports that there has been a little improvement with oral appliance use in terms of sleep quality but not up to the level of her expectation.  There are  reports of snoring with the oral appliance, but her  reports that the snoring is not as loud.  There have not been any reports of observed apneas during sleep or choking with the use of the oral appliance.    She reports occasionally gasping for air in spite of using the oral appliance.    Bedtime:10PM. She falls asleep pretty quickly, wakes up 1-2 times to use bathroom/due to the oral appliance popping out.  This  problem with the oral appliance has been happening at least 3-4 times a week in the last 1.5 months. Wake time:6AM  She reports non-restorative sleep(though there has been slight improvement with the oral appliance use)   She is still reports daytime fatigue and EDS, mostly between 1-2 PM   ESS:14/24  She naps after coming home from work .   There are no reports of drowsiness while driving locally /short commutes. But she does report feeling drowsy if the commute is for longer than 1 hour and usually lets her  drive.  Her current weight is 148 pounds.    Previous sleep study report:     Home sleep study report:   Study Date: 8/12/2020  Analysis Time: 587.3 minutes     Respiration:   Sleep Associated Hypoxemia: sustained hypoxemia was not present. Baseline oxygen saturation was 96.1 %.  Time with saturation less than or equal to 88% was 1.0 minutes. The lowest oxygen saturation was 86 %.   Snoring: Snoring was present.  Respiratory events: The home study revealed a presence of 155 obstructive apneas and 37 mixed and central apneas. There were 11 hypopneas resulting in a combined apnea/hypopnea index [AHI] of 20.7 events per hour.  AHI was 30.4 per hour supine, n/a per hour prone, 16.1 per hour on left side, and 2.9 per hour on right side.   Pattern: Excluding events noted above, respiratory rate and pattern was Normal.     Position: Percent of time spent: supine -57.9 %, prone - 0%, on left -13.9 %, on right -27.8 %.     Heart Rate: By pulse oximetry normal rate was noted.  "     Assessment:     Moderate obstructive sleep apnea, pronounced during supine sleep position.    Sleep associated hypoxemia was not present.        Past medical/surgical history, family history, social history, medications and allergies were reviewed.      Problem list:  Patient Active Problem List   Diagnosis     Anxiety     CARDIOVASCULAR SCREENING; LDL GOAL LESS THAN 160     Onychomycosis     Finger pain, right     Plantar warts     Pulmonary nodules     Mitral valve prolapse     Atypical lobular hyperplasia (ALH) of left breast     Obstructive sleep apnea syndrome     Breast neoplasm, Tis (LCIS)     Snoring     Irritable bowel syndrome     Articular disc disorder of temporomandibular joint               Physical Examination:    Ht 1.676 m (5' 6\")   Wt 67.1 kg (148 lb)   LMP 09/04/2019 (LMP Unknown)   BMI 23.89 kg/m    General: Pleasant. Cooperative. In no apparent distress.  Pulmonary: Able to speak in full sentences easily. No cough or wheeze.   Neurologic: Alert, oriented x3.  Psychiatric: Mood euthymic. Affect congruent with full range and intensity.             Assessment and Plan:   Obstructive sleep apnea: JUAN ALBERTO is being managed with oral appliance-patient reports nonrestorative sleep, snoring, fatigue and excessive daytime sleepiness, despite using the oral appliance.  We discussed the option of initiating empiric treatment with auto CPAP device and she was willing to try the CPAP treatment. DME orders were generated for auto titrating CPAP with pressure settings 5 to 15 cm water.  We discussed about the waiting list due to current CPAP supply shortage.    In the interim, she was recommended to continue using the oral appliance regularly during sleep along with positional device during sleep that she can purchase through online resources such as slumber bump or sleep noodle, until the CPAP device becomes available through the [facility] medical DME.    After obtaining the CPAP equipment, she was " "recommended to use the CPAP regularly during sleep.  She will be followed through the sleep therapy management program.  She was instructed to call the sleep clinic to schedule a follow-up via video visit in approximately 6 to 8 weeks after initiating CPAP therapy when compliance measures will be reviewed.     We discussed weight management with diet and exercise.    Patient was strongly advised to avoid driving, operating any heavy machinery or other hazardous situations while drowsy or sleepy.  Patient was counseled on the importance of driving while alert, to pull over if drowsy, or nap before getting into the vehicle if sleepy.     The above note was dictated using voice recognition software. Although reviewed after completion, some word and grammatical error may remain . Please contact the author for any clarifications.     \"I spent a total of  35  minutes  with Brii Taylor during today's video visit. Most of this time was spent counseling the patient and  coordinating care regarding JUAN ALBERTO,  oral appliance/positional therapy, CPAP treatment,  weight management , going over results of  previous home sleep study and chart review., including documentation and further activities as noted above.\"       Tyrone Gusman MD  [facility]  [address] [place] [address]       "

## 2022-11-10 NOTE — PATIENT INSTRUCTIONS
Obstructive sleep apnea(JUAN ALBERTO): JUAN ALBERTO is being managed with oral appliance-patient reports nonrestorative sleep, snoring, fatigue and excessive daytime sleepiness, despite using the oral appliance.    DME orders were generated for auto titrating CPAP with pressure settings 5 to 15 cm water.  We discussed about the waiting list due to current CPAP supply shortage.    In the interim, we recommend to continue using the oral appliance regularly during sleep along with positional device during sleep that you can purchase through online resources such as slumber bump or sleep noodle, until the CPAP device becomes available through the [facility] DME([phone]).  The [facility] DME will contact you once the device becomes available.  After obtaining the device, please use it regularly during sleep and get back to  [facility] DME or contact our clinic if any concerns with the mask /pressures / other problems with CPAP use.    After obtaining the CPAP equipment, please call the sleep clinic at [phone], to schedule a follow-up via video visit in approximately 6 to 8 weeks after initiating CPAP therapy when compliance measures will be reviewed.     Please do not drive or operate heavy machinery if drowsy or sleepy to prevent accidents.

## 2022-12-06 ENCOUNTER — DOCUMENTATION ONLY (OUTPATIENT)
Dept: SLEEP MEDICINE | Facility: CLINIC | Age: 57
End: 2022-12-06
Payer: COMMERCIAL

## 2022-12-06 DIAGNOSIS — G47.33 OBSTRUCTIVE SLEEP APNEA (ADULT) (PEDIATRIC): Primary | ICD-10-CM

## 2022-12-06 NOTE — PROGRESS NOTES
Patient was offered choice of vendor and chose [facility].  Patient Brii Taylor was set up at [place] on December 6, 2022. Patient received a Resmed Airsense 11 Pressures were set at 5-15 cm H2O.   Patient s ramp is 5 cm H2O for Auto and FLEX/EPR is EPR, 2.  Patient received a Resmed Mask name: AIRTOUCH F20  Full Face mask size For Her, Small, heated tubing and heated humidifier.  Patient does not need to meet compliance. Patient has a follow up on TBD with Dr. Gusman.    Ivis Willams

## 2022-12-09 ENCOUNTER — DOCUMENTATION ONLY (OUTPATIENT)
Dept: SLEEP MEDICINE | Facility: CLINIC | Age: 57
End: 2022-12-09
Payer: COMMERCIAL

## 2022-12-14 ENCOUNTER — IMMUNIZATION (OUTPATIENT)
Dept: FAMILY MEDICINE | Facility: CLINIC | Age: 57
End: 2022-12-14
Payer: COMMERCIAL

## 2022-12-14 PROCEDURE — 90682 RIV4 VACC RECOMBINANT DNA IM: CPT

## 2022-12-14 PROCEDURE — 90471 IMMUNIZATION ADMIN: CPT

## 2022-12-14 NOTE — PROGRESS NOTES
3 day Sleep therapy management telephone visit    Diagnostic AHI:  20.7 HST    Confirmed with patient at time of call- N/A Patient is still interested in STM service       Message left for patient to return call        Objective data     Order Settings for PAP  CPAP min 5    CPAP max 15             Device settings from machine CPAP min 5.0     CPAP max 15.0           EPR Setting TWO    RESMED soft response  OFF     Assessment: Nightly usage most nights under four hours       Action plan: Patient to have 14 day STM visit. Patient has a follow up visit scheduled:   no    Replacement device: No  STM ordered by provider: Yes     Total time spent on accessing and  interpreting remote patient PAP therapy data  10 minutes    Total time spent counseling, coaching  and reviewing PAP therapy data with patient  1 minutes    [id number] no

## 2022-12-22 ENCOUNTER — DOCUMENTATION ONLY (OUTPATIENT)
Dept: SLEEP MEDICINE | Facility: CLINIC | Age: 57
End: 2022-12-22
Payer: COMMERCIAL

## 2022-12-30 ENCOUNTER — HOSPITAL ENCOUNTER (OUTPATIENT)
Dept: MAMMOGRAPHY | Facility: CLINIC | Age: 57
Discharge: HOME | End: 2022-12-30
Attending: INTERNAL MEDICINE | Admitting: INTERNAL MEDICINE
Payer: COMMERCIAL

## 2022-12-30 DIAGNOSIS — D05.02 NEOPLASM OF LEFT BREAST, PRIMARY TUMOR STAGING CATEGORY TIS: LOBULAR CARCINOMA IN SITU (LCIS): ICD-10-CM

## 2022-12-30 PROCEDURE — 77067 SCR MAMMO BI INCL CAD: CPT

## 2023-01-06 ENCOUNTER — DOCUMENTATION ONLY (OUTPATIENT)
Dept: SLEEP MEDICINE | Facility: CLINIC | Age: 58
End: 2023-01-06
Payer: COMMERCIAL

## 2023-01-06 NOTE — PROGRESS NOTES
Patient returned call seeking advice on the multiple masks she has tried and what is recommended. She is having sores on her nose bridge from the F20 and Airtouch F20. She also tried the Dreamwear FFM and the F30i masks but could not get a good fit and did not like the noise of the mask tubings running up the sides of her face.     Recommended a youtube video with fitting advice on the Airtouch F2O. Recommended she utilize the Test Drive feature on the My Air efrain for mask fitting.   Recommended she use a cloth barrier or bandage at the bridge of her nose (patient had tried gecko pad but then had airleak by eyes)  Recommended patient loosen the mask as she has very low leak and may be overtightening.   Recommended Chuck mask as her next option if needed.

## 2023-01-06 NOTE — PROGRESS NOTES
30 DAY STM VISIT    Diagnostic AHI:  20.7 HST    PAP settings:  CPAP MIN CPAP MAX 95TH % PRESSURE EPR RESMED SOFT RESPONSE SETTING   5.0 cm  H20 15.0 cm  H20 10.8 cm  H20  TWO OFF     Device type: Auto-CPAP  Mask type:  Full Face Mask    Objective measures: 14 day rolling measures:    COMPLIANCE LEAK AHI AVERAGE USE IN MINUTES   0 % 18.4 6.17 25   GOAL >70% GOAL < 24 LPM GOAL <5 GOAL >240        Assessment: Pt not meeting objective benchmarks for compliance     Message left for patient to return call   Action plan: waiting for patient to return call.  and pt to have 6 month [facility] visit  Patient has not scheduled a follow up visit.     Total time spent on accessing and interpreting remote patient PAP therapy data  10 minutes    Total time spent counseling, coaching  and reviewing PAP therapy data with patient  1 minutes     [id number] this call  [id number] no  at 3 or 14 day [facility]

## 2023-01-23 ENCOUNTER — TELEPHONE (OUTPATIENT)
Dept: FAMILY MEDICINE | Facility: CLINIC | Age: 58
End: 2023-01-23
Payer: COMMERCIAL

## 2023-01-23 ENCOUNTER — TELEPHONE (OUTPATIENT)
Dept: ORTHOPEDICS | Facility: CLINIC | Age: 58
End: 2023-01-23
Payer: COMMERCIAL

## 2023-01-23 ENCOUNTER — NURSE TRIAGE (OUTPATIENT)
Dept: NURSING | Facility: CLINIC | Age: 58
End: 2023-01-23
Payer: COMMERCIAL

## 2023-01-23 DIAGNOSIS — M54.2 NECK PAIN: Primary | ICD-10-CM

## 2023-01-23 NOTE — TELEPHONE ENCOUNTER
Patient is having neck pain on the right side of her neck for one month or two.  Patient denies shock suspected and denies similar pain previously from heart attack or angina and confusion.  Denies difficulty breathing and denies chest pain and stiff neck and weakness of an arm or hand.  Denies problems with bowel or bladder.  Denies severe pain and denies fever.  Patient states that she would like to be seen by ortho.      Reason for Disposition    Neck pain persists > 2 weeks    Additional Information    Negative: Shock suspected (e.g., cold/pale/clammy skin, too weak to stand, low BP, rapid pulse)    Negative: Similar pain previously and it was from 'heart attack'    Negative: Similar pain previously from 'angina' and not relieved by nitroglycerin    Negative: Difficult to awaken or acting confused (e.g., disoriented, slurred speech)    Negative: Sounds like a life-threatening emergency to the triager    Negative: Difficulty breathing or unusual sweating (e.g., sweating without exertion)    Negative: Chest pain lasting longer than 5 minutes    Negative: Stiff neck (can't touch chin to chest) and has headache    Negative: Stiff neck (can't touch chin to chest) and fever    Negative: Weakness of an arm or hand    Negative: Problems with bowel or bladder control    Negative: Patient sounds very sick or weak to the triager    Negative: SEVERE pain (e.g., excruciating, unable to do any normal activities)    Negative: Head is twisting to one side (or ask 'is it turning against your will?')    Negative: Fever > 103 F (39.4 C)    Negative: Fever > 100.0 F (37.8 C) and Intravenous Drug Use (IVDU)    Negative: Fever > 100.0 F (37.8 C) and has diabetes mellitus or a weak immune system (e.g., HIV positive, cancer chemotherapy, organ transplant, splenectomy, chronic steroids)    Negative: Numbness in an arm or hand (i.e., loss of sensation)    Negative: Painful rash with multiple small blisters grouped together (i.e.,  dermatomal distribution or 'band' or 'stripe')    Negative: High-risk adult (e.g., history of cancer, HIV, or IV Drug Use)    Negative: Patient wants to be seen    Negative: Tenderness in front of neck over windpipe    Negative: MODERATE neck pain (e.g., interferes with normal activities like work or school)    Negative: Pain shoots (radiates) into arm or hand    Protocols used: NECK PAIN OR [id number]

## 2023-01-23 NOTE — TELEPHONE ENCOUNTER
See nurse triage note from today 1/23/22. Pain has been going on for 1-2 moths. Patient is also asking to be seen by ortho. Looks like patient may have placed call to ortho.     I will route triage to PCP as looks like it was not routed. Zahira Hernandes R.N.

## 2023-01-23 NOTE — TELEPHONE ENCOUNTER
Will route this message for PCP for review. Pain has been going on for 1-2 moths. Patient is also asking to be seen by ortho. Looks like patient may have placed call to ortho.       Dr. Orozco see below triage message and advise if would agree with referral or schedule with you. (ortho referral pended.)     Thank you,  ANGELITA Rodriges  [facility]

## 2023-01-24 NOTE — TELEPHONE ENCOUNTER
Patient called back and spine referral information relayed to patient and phone number given to patient.

## 2023-02-13 DIAGNOSIS — D05.02 NEOPLASM OF LEFT BREAST, PRIMARY TUMOR STAGING CATEGORY TIS: LOBULAR CARCINOMA IN SITU (LCIS): ICD-10-CM

## 2023-02-13 DIAGNOSIS — Z80.3 FHX: BREAST CANCER IN FIRST DEGREE RELATIVE: ICD-10-CM

## 2023-02-13 NOTE — TELEPHONE ENCOUNTER
Pending Prescriptions:                       Disp   Refills    tamoxifen (NOLVADEX) 20 MG tablet         90 tab*3            Sig: Take 1 tablet (20 mg) by mouth daily          Last Written Prescription Date:  1/26/22  Last Fill Quantity: 90,   # refills: 3  Last Office Visit: 7/27/22  Future Office visit:    Next 5 appointments (look out 90 days)    Feb 21, 2023  2:30 PM  Return Visit with Aubrie Barnes MD  [facility] ([facility] ) [address]  [address]  [facility] [address]  [phone]   Mar 21, 2023  1:30 PM  (Arrive by 1:10 PM)  Provider Visit with Chelly Orozco MD  [facility] ([facility] ) [address] [address] [phone]           Routing refill request to provider for review/approval.    Melissa Ruff, RN, BSN, OCN  Nurse Care Coordinator  [facility]  P: [phone]     F: [phone]

## 2023-02-14 RX ORDER — TAMOXIFEN CITRATE 20 MG/1
20 TABLET ORAL DAILY
Qty: 90 TABLET | Refills: 3 | Status: SHIPPED | OUTPATIENT
Start: 2023-02-14 | End: 2023-08-16

## 2023-02-14 NOTE — TELEPHONE ENCOUNTER
Signed Prescriptions:                        Disp   Refills    tamoxifen (NOLVADEX) 20 MG tablet          90 tab*3        Sig: Take 1 tablet (20 mg) by mouth daily  Authorizing Provider: BRENDA SWANSON, RN, BSN, OCN  Nurse Care Coordinator  [facility]  P: [phone]     F: [phone]

## 2023-02-21 ENCOUNTER — ONCOLOGY VISIT (OUTPATIENT)
Dept: ONCOLOGY | Facility: CLINIC | Age: 58
End: 2023-02-21
Attending: INTERNAL MEDICINE
Payer: COMMERCIAL

## 2023-02-21 VITALS
OXYGEN SATURATION: 98 % | TEMPERATURE: 98.1 F | HEART RATE: 92 BPM | SYSTOLIC BLOOD PRESSURE: 113 MMHG | HEIGHT: 65 IN | DIASTOLIC BLOOD PRESSURE: 70 MMHG | WEIGHT: 150.5 LBS | RESPIRATION RATE: 16 BRPM | BODY MASS INDEX: 25.08 KG/M2

## 2023-02-21 DIAGNOSIS — Z80.3 FHX: BREAST CANCER IN FIRST DEGREE RELATIVE: ICD-10-CM

## 2023-02-21 DIAGNOSIS — D05.02 NEOPLASM OF LEFT BREAST, PRIMARY TUMOR STAGING CATEGORY TIS: LOBULAR CARCINOMA IN SITU (LCIS): Primary | ICD-10-CM

## 2023-02-21 PROCEDURE — 99214 OFFICE O/P EST MOD 30 MIN: CPT | Performed by: INTERNAL MEDICINE

## 2023-02-21 PROCEDURE — G0463 HOSPITAL OUTPT CLINIC VISIT: HCPCS | Performed by: INTERNAL MEDICINE

## 2023-02-21 ASSESSMENT — PAIN SCALES - GENERAL: PAINLEVEL: NO PAIN (0)

## 2023-02-21 NOTE — LETTER
2/21/2023         RE: Brii Taylor  [address] [address]        Dear Colleague,    Thank you for referring your patient, Brii Taylor, to the [facility]. Please see a copy of my visit note below.    Visit Date: 02/21/2023    Brii Taylor is a 57-year-old patient who is here today for interim followup.    CHIEF COMPLAINT:   1.  LCIS of the left breast.  2.  Family history of breast cancer.  3.  Increased lifetime risk of breast cancer.    HISTORY OF PRESENT ILLNESS:  Brii is a 57-year-old postmenopausal patient.  She has got a diagnosis of LCIS of the left breast and also atypical ductal hyperplasia.  In addition to that, she has got a strong family history of breast cancer with a sister who had breast cancer at 38 and then again at 50.    Genetic testing was done, which was normal.  Because of Brii's lifetime risk of breast cancer, being greater than 25%, she sees me every 6 months and we do mammograms and MRI scan and she comes back today for interim followup.  She is feeling well today without any major problems.  I did review today a mammogram, which was done on 12/30/2022, and that was normal.  I have written her up for her next MRI scan.  She has got no major complaints today.  She denies cough, shortness of breath, bone pain, or any worrisome symptomatology.    REVIEW OF SYSTEMS:  A 12-point comprehensive review of systems is otherwise unremarkable.    SOCIAL HISTORY:  The patient is a nonsmoker.  She has 2 adult children.  She is an avid exerciser.    MEDICATIONS AND ALLERGIES:  Outlined in the nursing records.    PHYSICAL EXAMINATION:    GENERAL:  She is a well-appearing lady in no acute distress.  VITAL SIGNS:  Stable.  NECK:  Has no masses or goiter.  CHEST:  Clear to auscultation and percussion bilaterally.  HEART:  Sounds 1 2, normal, no added sounds, no murmurs.  BREASTS:  Right breast normal, no palpable masses.  Left breast,  status post left sided lumpectomy.  The scar looks good.  No palpable masses.  Left axilla negative.  GASTROINTESTINAL:  Abdomen is soft and nontender.  No hepatosplenomegaly.  EXTREMITIES:  Legs without tenderness or edema.  NEUROLOGIC:  The patient is alert and oriented x 3.    DATA REVIEW:  I have reviewed her recent mammogram.  I have also reviewed her last MRI scan.  I have written her up for another MRI scan.    Lab results, she had all of her labs done in 2022.  I have not repeated those today.    ASSESSMENT AND PLAN:  This is a 57-year-old patient with a history of lobular carcinoma in situ as well as atypical ductal hyperplasia, left breast.  We had her initially on chemoprevention with Arimidex, but she could not tolerate it because of joint problems, so she is back on tamoxifen.  I see her every 6 months.  Her tamoxifen is going well.  I have written her up for her next MRI scan and reviewed her mammogram today.  All of the above has been discussed with her.  She is in agreement with the plan.    Aubrie Barnes MD        D: 2023   T: 2023   MT: [facility]    Name:     VIGNESH MCGILL  MRN:      [id number]        Account:    [id number]   :      1965           Visit Date: 2023     Document: [id number]      Again, thank you for allowing me to participate in the care of your patient.        Sincerely,        Aubrie Barnes MD

## 2023-02-27 NOTE — PROGRESS NOTES
Visit Date: 02/21/2023    Brii Taylor is a 57-year-old patient who is here today for interim followup.    CHIEF COMPLAINT:   1.  LCIS of the left breast.  2.  Family history of breast cancer.  3.  Increased lifetime risk of breast cancer.    HISTORY OF PRESENT ILLNESS:  Brii is a 57-year-old postmenopausal patient.  She has got a diagnosis of LCIS of the left breast and also atypical ductal hyperplasia.  In addition to that, she has got a strong family history of breast cancer with a sister who had breast cancer at 38 and then again at 50.    Genetic testing was done, which was normal.  Because of Brii's lifetime risk of breast cancer, being greater than 25%, she sees me every 6 months and we do mammograms and MRI scan and she comes back today for interim followup.  She is feeling well today without any major problems.  I did review today a mammogram, which was done on 12/30/2022, and that was normal.  I have written her up for her next MRI scan.  She has got no major complaints today.  She denies cough, shortness of breath, bone pain, or any worrisome symptomatology.    REVIEW OF SYSTEMS:  A 12-point comprehensive review of systems is otherwise unremarkable.    SOCIAL HISTORY:  The patient is a nonsmoker.  She has 2 adult children.  She is an avid exerciser.    MEDICATIONS AND ALLERGIES:  Outlined in the nursing records.    PHYSICAL EXAMINATION:    GENERAL:  She is a well-appearing lady in no acute distress.  VITAL SIGNS:  Stable.  NECK:  Has no masses or goiter.  CHEST:  Clear to auscultation and percussion bilaterally.  HEART:  Sounds 1 2, normal, no added sounds, no murmurs.  BREASTS:  Right breast normal, no palpable masses.  Left breast, status post left sided lumpectomy.  The scar looks good.  No palpable masses.  Left axilla negative.  GASTROINTESTINAL:  Abdomen is soft and nontender.  No hepatosplenomegaly.  EXTREMITIES:  Legs without tenderness or edema.  NEUROLOGIC:  The patient is alert and  oriented x 3.    DATA REVIEW:  I have reviewed her recent mammogram.  I have also reviewed her last MRI scan.  I have written her up for another MRI scan.    Lab results, she had all of her labs done in 2022.  I have not repeated those today.    ASSESSMENT AND PLAN:  This is a 57-year-old patient with a history of lobular carcinoma in situ as well as atypical ductal hyperplasia, left breast.  We had her initially on chemoprevention with Arimidex, but she could not tolerate it because of joint problems, so she is back on tamoxifen.  I see her every 6 months.  Her tamoxifen is going well.  I have written her up for her next MRI scan and reviewed her mammogram today.  All of the above has been discussed with her.  She is in agreement with the plan.    Aubrie Barnes MD        D: 2023   T: 2023   MT: JEANNIE    Name:     VIGNESH MCGILLSpeedy  MRN:      [id number]        Account:    [id number]   :      1965           Visit Date: 2023     Document: [id number]

## 2023-03-05 ENCOUNTER — DOCUMENTATION ONLY (OUTPATIENT)
Dept: HOME HEALTH SERVICES | Facility: CLINIC | Age: 58
End: 2023-03-05
Payer: COMMERCIAL

## 2023-03-17 ENCOUNTER — ALLIED HEALTH/NURSE VISIT (OUTPATIENT)
Dept: RESEARCH | Facility: CLINIC | Age: 58
End: 2023-03-17
Payer: COMMERCIAL

## 2023-03-17 VITALS
WEIGHT: 145 LBS | OXYGEN SATURATION: 97 % | BODY MASS INDEX: 23.3 KG/M2 | RESPIRATION RATE: 14 BRPM | DIASTOLIC BLOOD PRESSURE: 85 MMHG | SYSTOLIC BLOOD PRESSURE: 136 MMHG | HEART RATE: 67 BPM | HEIGHT: 66 IN

## 2023-03-17 DIAGNOSIS — Z00.6 EXAMINATION OF PARTICIPANT OR CONTROL IN CLINICAL RESEARCH: Primary | ICD-10-CM

## 2023-03-17 PROCEDURE — 99207 PR NO CHARGE NURSE ONLY: CPT

## 2023-03-17 NOTE — PROGRESS NOTES
[facility] Study Consent    Study Description: The purpose of this study is to collect sleep data for product research and development. We will compare the performance of the Smartwatch to a medical-grade Home Sleep Test (HST). We hope to learn whether the Smartwatch can be used to track sleep quality at home.        Brii Taylor a 57 year old female, was on-site  today to discuss participation in the [facility] sleep data collection study.     The consent form was reviewed with the patient.     The review of the study included:    Study Purpose     COVID-19 Criteria     Participant Responsibilities      Study Data and Devices    Benefits and Risks of Participation    Compensation and Costs of Participation    Voluntary Participation    Confidentiality     Injury and Legal Rights    Protocol Version: 3.0    The subject was queried in regards to her willingness to continue and her questions were answered to her satisfaction.     The patient has given her agreement to volunteer and participate in the above noted study.     The Consent  and HIPAA form version 4.0 28-FEB-2023 was signed at 13:16  on  17-MAR-2023 with the Clinical Research Unit of [facility].     A copy of the [facility] consent will be placed in subject's medical record. A copy of the consent form was given to the subject today.    Study data is directly entered into Epic and RushFiles per protocol.     No study procedures were done prior to Brii Taylor providing informed consent.       17-MAR-2023    JUSTIN Brown

## 2023-03-17 NOTE — PROGRESS NOTES
[facility] In-Person Study Note    Study Description: The purpose of this study is to collect sleep data for product research and development. We will compare the performance of the Smartwatch to a medical-grade Home Sleep Test (HST). We hope to learn whether the Smartwatch can be used to track sleep quality at home.       Subject ID:     SCREENING     Demographic Info  Brii Taylor   1965          57 year old  female    Race: White  Ethnicity: Non-/     Guzman Scale: OBAN Guzman: 2    Medical History:  Past Medical History:   Diagnosis Date     Anxiety     sees psychiatrist     Migraine, unspecified, without mention of intractable migraine without mention of status migrainosus      Sleep apnea                 Sleep Apnea Diagnosis: Yes    Allergies:  Allergies   Allergen Reactions     Penicillins      PN: LW Reaction: rash        Current Medications:     Review of your medicines          Accurate as of March 17, 2023  1:17 PM. If you have any questions, ask your nurse or doctor.            CONTINUE these medicines which have NOT CHANGED      Dose / Directions   acetaminophen-caffeine 500-65 MG Tabs  1/2020 - present  Commonly known as: EXCEDRIN TENSION HEADACHE      Dose: 2 tablet  Take 2 tablets by mouth every 6 hours as needed for mild pain  Refills: 0             sertraline 100 MG tablet  10/3/2022 - present  Commonly known as: ZOLOFT  Used for: Anxiety      Dose: 100 mg  Take 1 tablet (100 mg) by mouth daily  Quantity: 90 tablet  Refills: 1     tamoxifen 20 MG tablet  1/2020 - present  Commonly known as: NOLVADEX  Used for: FHx: breast cancer in first degree relative, Neoplasm of left breast, primary tumor staging category Tis: lobular carcinoma in situ (LCIS)      Dose: 20 mg  Take 1 tablet (20 mg) by mouth daily  Quantity: 90 tablet  Refills: 3            Past Surgical History:  Past Surgical History:   Procedure Laterality Date     BIOPSY BREAST SEED LOCALIZATION Left  "1/24/2020     COLONOSCOPY Left 12/18/2017     TONSILLECTOMY                Vitals:  Time Subject Sat: 13:16   /85 (BP Location: Left arm, Patient Position: Sitting, Cuff Size: Adult Regular)   Pulse 67   Resp 14   Ht 1.676 m (5' 6\")   Wt 65.8 kg (145 lb)   LMP 09/20/2019   SpO2 97%   BMI 23.40 kg/m         Lifestyle:  Occupation:     Do you have a Bed Partner/Co-Sleeper? yes   Previous Sleep Study?: Yes       (If Yes) Initial AHI Score: 20.7    Alcohol Use: 1 drinks/week  Smoking History: No      Measurements & Preferences:  Dominant Hand: Right   Preferred Watch Wrist: Left    Left Wrist:  Circumference (mm): 138   Hairiness: A: Thin Hair, Low Density   Tattoos, Moles, Scars? None    Right Wrist:   Circumference (mm): 144   Hairiness: A: Thin Hair, Low Density   Tattoos, Moles, Scars? None    Was data collected?: Yes    ENROLLMENT & DEVICES      Device IDs:  Watch ID: [id number]    Watch Size: 40 mm   Band Size: S/M  Natural Notch: 4   Secure Notch: 4   Watch Setting Worn: 4   Phone ID: [id number]  Nox ID: [id number]     Has the Subject Enrolled in the Study [place]: Yes   Confirmation of Enrollment?: Yes   Enrollment Date: 17-MAR-2023  Smartwatch Training Completed? Yes   Smartwatch Training Date: 17-MAR-2023  HSAT Training Completed? Yes   HSAT Training Date: 17-MAR-2023    17-MAR-2023  JUSTIN Brown  "

## 2023-03-17 NOTE — PROGRESS NOTES
"  [place] Sleep Study Inclusion/Exclusion Criteria:    Study Name: [place]  : Ariadna Brennan MD    Study Description: The purpose of this study is to collect sleep data for product research and development. We will compare the performance of the Smartwatch to a medical-grade Home Sleep Test (HST). We hope to learn whether the Smartwatch can be used to track sleep quality at home.    Protocol Version: 3.0  22-DEC-2022     Criteria #  Inclusion Criteria (ALL MUST BE YES)  YES/NO/N/A   1  Adult (age > 18 years old) Yes   2  Subject has a reliable WiFi network (examples of \"reliable\": able to video-conference call with a clear image, able to stream movies or video without interruption, play online computer games) Yes   3  Subject has access to a computer or smartphone with audiovisual capabilities (e.g., webcam and microphone/speaker*)  Yes   4  Subject is willing to comply with the study procedures    Yes         * applicable for subjects that are remotely consented and/or trained.     Criteria # Exclusion Criteria (ALL MUST BE NO) YES/NO/N/A   1  Active COVID infection   No   2  Decisionally impaired participants (no surrogate consenting is allowed)    No   3  Not understanding written and spoken English     No   4  Open wounds(s) on the wrist and/or forearm (in area where Smartwatch would be worn) No   5  Tattoos, large moles or scars on the dorsal aspect of wrist where the Smartwatch would be worn; individuals with tattoo on only one wrist may participate, if Smartwatch is worn on non-tattooed wrist    No   6  Known sensitivity or allergy to component of the Smartwatch   No   7  Planned travel across 2 or more times zones during study participation   No   8  Planned travel away from home for more than 5 days during the study period No   9  Overnight rotating shift work     No   10  Active and adherent to treatment for sleep apnea   (e.g., CPAP, dental appliance, Hypoglossal nerve stimulator)    " No   11  Plans to start treatment for apnea within the study timeframe    No   12  Overnight supplemental oxygen use   No   13    Employed by a company that develops or sells medical and/or fitness devices (e.g., ECG monitors, wearable fitness bands, sleep monitors, etc.) or are technology journalists (e.g., professional bloggers, TV, magazine, newspaper reporters, etc.) No   14    Participated in a previous sleep study that used a wrist-worn sensor device by same sponsor  No     Patient does fulfill study inclusion criteria and no exclusion criteria are found.     Ariadna Brennan MD    17-MAR-2023    JUSTIN Brown

## 2023-03-23 ENCOUNTER — ALLIED HEALTH/NURSE VISIT (OUTPATIENT)
Dept: RESEARCH | Facility: CLINIC | Age: 58
End: 2023-03-23
Payer: COMMERCIAL

## 2023-03-23 DIAGNOSIS — Z00.6 EXAMINATION OF PARTICIPANT OR CONTROL IN CLINICAL RESEARCH: Primary | ICD-10-CM

## 2023-03-23 PROCEDURE — 99207 PR NO CHARGE NURSE ONLY: CPT

## 2023-03-23 NOTE — PROGRESS NOTES
[place] HSAT Kit Return  Study Description: The purpose of this study is to collect sleep data for product research and development. We will compare the performance of the Smartwatch to a medical-grade Home Sleep Test (HST). We hope to learn whether the Smartwatch can be used to track sleep quality at home.      Subject Number:     Study Day: Day 7    Tracking Number: N/A    Compliance Questions:  Did you wear a T-Shirt over the heart monitor? No   Did you double tape device tubing/wires?No   Did you turn-off your heart monitor each morning after collection?Yes  Did all equipment function correctly and stay-on throughout night?Yes  Did you see the red light underneath the watch turn-on both nights?Yes    Subject reported that the data sync part of the morning survey does not populate every single day. When it is there she is diligent to hit done after the timer counts down.      Participant returned HSAT kit with all devices returned. Participant verbalized understanding that if their data is unusable per sponsor, they will conduct one additional set of HSAT recordings. All other questions/concerns addressed.     Adverse Events & Con Med Assessment Performed?   [x]     (If yes, please generate adverse event report for PI to cosign)    23-MAR-2023    Cami Velarde

## 2023-03-27 NOTE — PROGRESS NOTES
" [place] Study Phone Visit    Study Description: The purpose of this study is to collect sleep data for product research and development. We will compare the performance of the Smartwatch to a medical-grade Home Sleep Test (HST). We hope to learn whether the Smartwatch can be used to track sleep quality at home.      Subject Number:     Study Day: Week 3    [place] Study Subject was called today to:    Review Compliance     Answer subject questions    Deliver study protocol reminders to subject    Reminders Checklist:   [x]  Connected to WiFi  [x]  Completing both morning and evening surveys  [x]  Watch and Phone on  near each other after completed morning survey   [x]  Take devices off before showering/getting them wet  [x]  Make sure to dismiss any update notifications. -Tap \"Not Now\"  [x]  Good HSAT   [x]  Remind them of next appointment with us     (Applicable during last 4 days)  [x]  NO WATCH WEAR JUST SURVEY     Adverse Events & Con Med Assessment Performed?   [x] None    (If yes, please generate adverse event report for PI to cosign)      27-MAR-2023    Judy Andrew RN    "

## 2023-03-29 ENCOUNTER — TELEPHONE (OUTPATIENT)
Dept: RESEARCH | Facility: CLINIC | Age: 58
End: 2023-03-29
Payer: COMMERCIAL

## 2023-03-29 NOTE — TELEPHONE ENCOUNTER
[place] HSAT Results Phone Call  Study Description: The purpose of this study is to collect sleep data for product research and development. We will compare the performance of the Smartwatch to a medical-grade Home Sleep Test (HST). We hope to learn whether the Smartwatch can be used to track sleep quality at home.      Brii Taylor was called today to review results of her Home Sleep Test (HSAT). NP called patient at 9:36am and 18:02 on 3/29/2023 and left a message to call me. NP called patient at 9:36 am left a message. NP talked to patient at 1900 with results and recommended to follow up with primary and sleep medicine. NP sent a letter to both these providers via e-mail. Patient will make a follow up appointment with each.     They were informed of the significance of the results and they affirmed understanding. Additionally I informed them that these results are available for review by their provider in their chart.  They had no further questions at this time. 41.9, 40, 41, 38.6 Patient will follow up with primary clinic    Additional Comments: HR . With a duration over 90 was 0.1-0.2 minutes.     AHI Score: AHI of 30 or more (Severe JUAN ALBERTO)  41.9,40.9, 41, 38.6 With less than 90% O2 saturation was 8.4%, less than 88% was 5.8% and less than 85% was 0.3.    Results:  NIGHT 1  Was it scorable?: Yes     Channel Presence: Scored and all 6 channels present  Select Missing Channels: N/A     (If applicable)    Analysis Start Date: 3/19/2023   Analysis Duration: 8hr 45min  Analysis Start Time: 9:15 pm        Analysis Stop Time: 6:00 am    Est Total Sleep Time: 8 hr 44 min      NIGHT 1 Scorer 1 Scorer 2   Respiratory Parameters   Apnea + Hypopneas (AH)   Total    41.9 /h   40 /h   Supine    57.3 /h   55.8 /h   Non-Supine   25.2 /h   22.9 /h   Count   366    349        Total Total    Obstructive (OA)    29.2 /h   28.6 /h   Hypopneas    9.6 /h   9.5 /h   Central Apnea Hypopnea (CA+CH)    1.3 /h   0.6 /h   Oxygen  Saturation (SpO2)   Oxygen Desaturation Index (MAIKEL)    39.3 /h   39.6 /h   Minimum SpO2    80 %   80 %   SpO2 Duration < 88%    5.1 %   5.1 %   Average Desat Drop    7.6 %   7.6 %   Position & Analysis Time   Supine (in TST) Duration    272.1 min   272.1 min       NIGHT 2  Was it scorable?: Yes (If no, delete the table below)     Channel Presence: Scored and all 6 channels present  Select Missing Channels: N/A     (If applicable)    Analysis Start Date: 3/20/2023   Analysis Duration: 7hr 41min  Analysis Start Time: 10:04 pm        Analysis Stop Time: 5:45 am    Est Total Sleep Time: 7h 38 m       NIGHT 2 Scorer 1 Scorer 2   Respiratory Parameters   Apnea + Hypopneas (AH)   Total    41 /h   38.6 /h   Supine    53.9 /h   51.1 /h   Non-Supine   17.4 /h   15.9 /h   Count   313    295        Total Total    Obstructive (OA)    31 /h   30.8 /h   Hypopneas    8.6 /h   6.7 /h   Central Apnea Hypopnea (CA+CH)    0.8 /h   0.7 /h   Oxygen Saturation (SpO2)   Oxygen Desaturation Index (MAIKEL)    35.9 /h   36.7 /h   Minimum SpO2    82 %   82 %   SpO2 Duration < 88%    5.8 %   5.8 %   Average Desat Drop    8 %   8 %   Position & Analysis Time   Supine (in TST) Duration    296.1 min   296.1 min       29-MAR-2023    Tania Willams NP

## 2023-03-30 ENCOUNTER — VIRTUAL VISIT (OUTPATIENT)
Dept: RESEARCH | Facility: CLINIC | Age: 58
End: 2023-03-30
Payer: COMMERCIAL

## 2023-03-30 DIAGNOSIS — Z00.6 RESEARCH SUBJECT: Primary | ICD-10-CM

## 2023-04-06 ENCOUNTER — VIRTUAL VISIT (OUTPATIENT)
Dept: RESEARCH | Facility: CLINIC | Age: 58
End: 2023-04-06
Payer: COMMERCIAL

## 2023-04-06 DIAGNOSIS — Z00.6 RESEARCH SUBJECT: Primary | ICD-10-CM

## 2023-04-06 PROCEDURE — 99207 PR NO CHARGE NURSE ONLY: CPT

## 2023-04-06 NOTE — PROGRESS NOTES
" [place] Study Phone Visit    Study Description: The purpose of this study is to collect sleep data for product research and development. We will compare the performance of the Smartwatch to a medical-grade Home Sleep Test (HST). We hope to learn whether the Smartwatch can be used to track sleep quality at home.      Subject Number:     Study Day: Week 4    [place] Study Subject was called today to:    Review Compliance     Answer subject questions    Deliver study protocol reminders to subject    Reminders Checklist:   [x]  Connected to WiFi  [x]  Completing both morning and evening surveys  [x]  Watch and Phone on  near each other after completed morning survey   [x]  Take devices off before showering/getting them wet  [x]  Make sure to dismiss any update notifications. -Tap \"Not Now\"  [x]  Good HSAT   [x]  Remind them of next appointment with us     (Applicable during last 4 days)  [x]  NO WATCH WEAR JUST SURVEY     Adverse Events & Con Med Assessment Performed?   [x] None    (If yes, please generate adverse event report for PI to cosign)      6-APR-2023    Judy Andrew RN    "

## 2023-04-13 ENCOUNTER — VIRTUAL VISIT (OUTPATIENT)
Dept: RESEARCH | Facility: CLINIC | Age: 58
End: 2023-04-13
Payer: COMMERCIAL

## 2023-04-13 DIAGNOSIS — Z00.6 RESEARCH SUBJECT: Primary | ICD-10-CM

## 2023-04-13 PROCEDURE — 99207 PR NO CHARGE NURSE ONLY: CPT

## 2023-04-13 NOTE — PROGRESS NOTES
" [place] Study Phone Visit     Study Description: The purpose of this study is to collect sleep data for product research and development. We will compare the performance of the Smartwatch to a medical-grade Home Sleep Test (HST). We hope to learn whether the Smartwatch can be used to track sleep quality at home.      Subject Number:     Study Day: Week 5    [place] Study Subject was called today to:    Review Compliance     Answer subject questions    Deliver study protocol reminders to subject    Reminders Checklist:   [x]  Connected to WiFi  [x]  Completing both morning and evening surveys  [x]  Watch and Phone on  near each other after completed morning survey   [x]  Take devices off before showering/getting them wet  [x]  Make sure to dismiss any update notifications. -Tap \"Not Now\"  [x]  Good HSAT   [x]  Remind them of next appointment with us     (Applicable during last 4 days)  [x]  NO WATCH WEAR JUST SURVEY     Adverse Events & Con Med Assessment Performed?   [x] None    (If yes, please generate adverse event report for PI to lisa)      13-APR-2023    Judy Andrew RN    "

## 2023-04-20 ENCOUNTER — PATIENT OUTREACH (OUTPATIENT)
Dept: CARE COORDINATION | Facility: CLINIC | Age: 58
End: 2023-04-20

## 2023-04-20 ENCOUNTER — ALLIED HEALTH/NURSE VISIT (OUTPATIENT)
Dept: RESEARCH | Facility: CLINIC | Age: 58
End: 2023-04-20
Payer: COMMERCIAL

## 2023-04-20 DIAGNOSIS — Z00.6 RESEARCH SUBJECT: Primary | ICD-10-CM

## 2023-04-20 PROCEDURE — 99207 PR NO CHARGE NURSE ONLY: CPT

## 2023-04-20 NOTE — PROGRESS NOTES
[place] Study End Note    Study Description: The purpose of this study is to collect sleep data for product research and development. We will compare the performance of the Smartwatch to a medical-grade Home Sleep Test (HST). We hope to learn whether the Smartwatch can be used to track sleep quality at home.        Study Termination/Completion Date: 20-APR-2023  Reason for Termination (If Applicable): Completed     Subject returned all study devices today and this completes this study for the subject.    Adverse Events & Con Med Assessment Performed?   [x]       20-APR-2023    Sawyer Smallwood

## 2023-05-21 ENCOUNTER — HEALTH MAINTENANCE LETTER (OUTPATIENT)
Age: 58
End: 2023-05-21

## 2023-06-22 SDOH — ECONOMIC STABILITY: INCOME INSECURITY: HOW HARD IS IT FOR YOU TO PAY FOR THE VERY BASICS LIKE FOOD, HOUSING, MEDICAL CARE, AND HEATING?: NOT HARD AT ALL

## 2023-06-22 SDOH — ECONOMIC STABILITY: FOOD INSECURITY: WITHIN THE PAST 12 MONTHS, THE FOOD YOU BOUGHT JUST DIDN'T LAST AND YOU DIDN'T HAVE MONEY TO GET MORE.: NEVER TRUE

## 2023-06-22 SDOH — HEALTH STABILITY: PHYSICAL HEALTH: ON AVERAGE, HOW MANY DAYS PER WEEK DO YOU ENGAGE IN MODERATE TO STRENUOUS EXERCISE (LIKE A BRISK WALK)?: 4 DAYS

## 2023-06-22 SDOH — HEALTH STABILITY: PHYSICAL HEALTH: ON AVERAGE, HOW MANY MINUTES DO YOU ENGAGE IN EXERCISE AT THIS LEVEL?: 30 MIN

## 2023-06-22 SDOH — ECONOMIC STABILITY: INCOME INSECURITY: IN THE LAST 12 MONTHS, WAS THERE A TIME WHEN YOU WERE NOT ABLE TO PAY THE MORTGAGE OR RENT ON TIME?: NO

## 2023-06-22 SDOH — ECONOMIC STABILITY: FOOD INSECURITY: WITHIN THE PAST 12 MONTHS, YOU WORRIED THAT YOUR FOOD WOULD RUN OUT BEFORE YOU GOT MONEY TO BUY MORE.: NEVER TRUE

## 2023-06-22 ASSESSMENT — ENCOUNTER SYMPTOMS
MYALGIAS: 0
FEVER: 0
CHILLS: 0
FREQUENCY: 0
BREAST MASS: 0
ARTHRALGIAS: 1
HEARTBURN: 0
HEMATOCHEZIA: 0
HEADACHES: 0
WEAKNESS: 0
NERVOUS/ANXIOUS: 0
PARESTHESIAS: 0
EYE PAIN: 0
DIZZINESS: 0
NAUSEA: 0
DYSURIA: 0
DIARRHEA: 0
COUGH: 0
SORE THROAT: 0
HEMATURIA: 0
ABDOMINAL PAIN: 0
PALPITATIONS: 0
JOINT SWELLING: 0
CONSTIPATION: 0
SHORTNESS OF BREATH: 0

## 2023-06-22 ASSESSMENT — LIFESTYLE VARIABLES
HOW OFTEN DO YOU HAVE SIX OR MORE DRINKS ON ONE OCCASION: NEVER
AUDIT-C TOTAL SCORE: 1
SKIP TO QUESTIONS 9-10: 1
HOW MANY STANDARD DRINKS CONTAINING ALCOHOL DO YOU HAVE ON A TYPICAL DAY: 1 OR 2
HOW OFTEN DO YOU HAVE A DRINK CONTAINING ALCOHOL: MONTHLY OR LESS

## 2023-06-22 ASSESSMENT — SOCIAL DETERMINANTS OF HEALTH (SDOH)
DO YOU BELONG TO ANY CLUBS OR ORGANIZATIONS SUCH AS CHURCH GROUPS UNIONS, FRATERNAL OR ATHLETIC GROUPS, OR SCHOOL GROUPS?: NO
HOW OFTEN DO YOU GET TOGETHER WITH FRIENDS OR RELATIVES?: TWICE A WEEK
IN A TYPICAL WEEK, HOW MANY TIMES DO YOU TALK ON THE PHONE WITH FAMILY, FRIENDS, OR NEIGHBORS?: THREE TIMES A WEEK
HOW OFTEN DO YOU ATTEND CHURCH OR RELIGIOUS SERVICES?: MORE THAN 4 TIMES PER YEAR

## 2023-06-23 ENCOUNTER — OFFICE VISIT (OUTPATIENT)
Dept: FAMILY MEDICINE | Facility: CLINIC | Age: 58
End: 2023-06-23
Payer: COMMERCIAL

## 2023-06-23 VITALS
WEIGHT: 151 LBS | DIASTOLIC BLOOD PRESSURE: 70 MMHG | SYSTOLIC BLOOD PRESSURE: 130 MMHG | TEMPERATURE: 98 F | HEIGHT: 65 IN | OXYGEN SATURATION: 98 % | HEART RATE: 70 BPM | BODY MASS INDEX: 25.16 KG/M2 | RESPIRATION RATE: 16 BRPM

## 2023-06-23 DIAGNOSIS — Z00.00 ROUTINE GENERAL MEDICAL EXAMINATION AT A HEALTH CARE FACILITY: Primary | ICD-10-CM

## 2023-06-23 DIAGNOSIS — F41.9 ANXIETY: ICD-10-CM

## 2023-06-23 LAB — HBA1C MFR BLD: 5.3 % (ref 0–5.6)

## 2023-06-23 PROCEDURE — 36415 COLL VENOUS BLD VENIPUNCTURE: CPT | Performed by: FAMILY MEDICINE

## 2023-06-23 PROCEDURE — 80061 LIPID PANEL: CPT | Performed by: FAMILY MEDICINE

## 2023-06-23 PROCEDURE — 99396 PREV VISIT EST AGE 40-64: CPT | Mod: 25 | Performed by: FAMILY MEDICINE

## 2023-06-23 PROCEDURE — 84443 ASSAY THYROID STIM HORMONE: CPT | Performed by: FAMILY MEDICINE

## 2023-06-23 PROCEDURE — 80053 COMPREHEN METABOLIC PANEL: CPT | Performed by: FAMILY MEDICINE

## 2023-06-23 PROCEDURE — 90471 IMMUNIZATION ADMIN: CPT | Performed by: FAMILY MEDICINE

## 2023-06-23 PROCEDURE — 83036 HEMOGLOBIN GLYCOSYLATED A1C: CPT | Performed by: FAMILY MEDICINE

## 2023-06-23 PROCEDURE — 90715 TDAP VACCINE 7 YRS/> IM: CPT | Performed by: FAMILY MEDICINE

## 2023-06-23 RX ORDER — SERTRALINE HYDROCHLORIDE 100 MG/1
100 TABLET, FILM COATED ORAL DAILY
Qty: 90 TABLET | Refills: 3 | Status: SHIPPED | OUTPATIENT
Start: 2023-06-23 | End: 2024-05-28

## 2023-06-23 ASSESSMENT — ENCOUNTER SYMPTOMS
HEMATOCHEZIA: 0
BREAST MASS: 0
SORE THROAT: 0
EYE PAIN: 0
CONSTIPATION: 0
HEADACHES: 0
NAUSEA: 0
CHILLS: 0
DIZZINESS: 0
SHORTNESS OF BREATH: 0
HEARTBURN: 0
FEVER: 0
COUGH: 0
ARTHRALGIAS: 1
WEAKNESS: 0
FREQUENCY: 0
PALPITATIONS: 0
JOINT SWELLING: 0
HEMATURIA: 0
DIARRHEA: 0
MYALGIAS: 0
DYSURIA: 0
ABDOMINAL PAIN: 0
NERVOUS/ANXIOUS: 0
PARESTHESIAS: 0

## 2023-06-23 NOTE — PROGRESS NOTES
SUBJECTIVE:   CC: Brii is an 57 year old who presents for preventive health visit.     Healthy Habits:     Getting at least 3 servings of Calcium per day:  Yes    Bi-annual eye exam:  NO    Dental care twice a year:  Yes    Sleep apnea or symptoms of sleep apnea:  Sleep apnea    Diet:  Other    Frequency of exercise:  4-5 days/week    Duration of exercise:  30-45 minutes    Taking medications regularly:  Yes    Medication side effects:  Other    PHQ-2 Total Score: 0    Additional concerns today:  Yes        Today's PHQ-2 Score:       6/22/2023    10:01 AM   PHQ-2 ( 1999 [employer])   Q1: Little interest or pleasure in doing things 0   Q2: Feeling down, depressed or hopeless 0   PHQ-2 Score 0   Q1: Little interest or pleasure in doing things Not at all   Q2: Feeling down, depressed or hopeless Not at all   PHQ-2 Score 0           Social History     Tobacco Use     Smoking status: Never     Smokeless tobacco: Never   Substance Use Topics     Alcohol use: No           6/22/2023    10:01 AM   Alcohol Use   Prescreen: >3 drinks/day or >7 drinks/week? No     Reviewed orders with patient.  Reviewed health maintenance and updated orders accordingly - Yes  Patient Active Problem List   Diagnosis     Anxiety     CARDIOVASCULAR SCREENING; LDL GOAL LESS THAN 160     Onychomycosis     Finger pain, right     Plantar warts     Pulmonary nodules     Mitral valve prolapse     Atypical lobular hyperplasia (ALH) of left breast     Obstructive sleep apnea syndrome     Breast neoplasm, Tis (LCIS)     Snoring     Irritable bowel syndrome     Articular disc disorder of temporomandibular joint     Past Surgical History:   Procedure Laterality Date     BIOPSY  ? 2010    Needle biopsy- breast- benign     BIOPSY BREAST SEED LOCALIZATION Left 01/24/2020    Procedure: LEFT BREAST SEED LOCALIZED EXCISIONAL BIOPSY;  Surgeon: Cristi Arndt MD;  Location: [facility] OR     BREAST SURGERY  2020?    Lumpectomy     COLONOSCOPY Left 12/18/2017     Procedure: COLONOSCOPY;  Colonoscopy; difficulty in advancing scope (tight corner) so used biopsy forcep to follow/ advance scope;  Surgeon: Babar Gramajo MD;  Location:  [facility]     TONSILLECTOMY         Social History     Tobacco Use     Smoking status: Never     Smokeless tobacco: Never   Substance Use Topics     Alcohol use: No     Family History   Problem Relation Age of Onset     Hypertension Mother      Cancer Mother 70        nonHodgkins lymphoma     Psychotic Disorder Mother         anxiety     Other Cancer Mother         Non Hodgkins lymphoma     Alzheimer Disease Father      Breast Cancer Sister         breast ca age 38     Gastrointestinal Disease Brother         crohns or colitis     No Known Problems Daughter      Colon Cancer No family hx of      Ovarian Cancer No family hx of            Breast Cancer Screening:    FHS-7:       12/29/2021    10:12 AM 4/14/2022     4:48 PM 12/30/2022    10:29 AM 6/22/2023    10:11 AM   Breast CA Risk Assessment (FHS-7)   Did any of your first-degree relatives have breast or ovarian cancer? Yes Yes Yes Yes   Did any of your relatives have bilateral breast cancer? No No No No   Did any man in your family have breast cancer? No  No No   Did any woman in your family have breast and ovarian cancer? No  No Yes   Did any woman in your family have breast cancer before age 50 y? Yes  Yes Yes   Do you have 2 or more relatives with breast and/or ovarian cancer? No  No Yes   Do you have 2 or more relatives with breast and/or bowel cancer? No  No Yes       Mammogram Screening: Recommended mammography every 1-2 years with patient discussion and risk factor consideration  Pertinent mammograms are reviewed under the imaging tab.    History of abnormal Pap smear: NO - age 30-65 PAP every 5 years with negative HPV co-testing recommended      Latest Ref Rng & Units 4/14/2022     5:12 PM   PAP / HPV   PAP  Negative for Intraepithelial Lesion or Malignancy (NILM)    HPV 16 DNA Negative  "Negative    HPV 18 DNA Negative Negative    Other HR HPV Negative Negative      Reviewed and updated as needed this visit by clinical staff   Tobacco  Allergies  Meds              Reviewed and updated as needed this visit by Provider                     Review of Systems   Constitutional: Negative for chills and fever.   HENT: Negative for congestion, ear pain, hearing loss and sore throat.    Eyes: Negative for pain and visual disturbance.   Respiratory: Negative for cough and shortness of breath.    Cardiovascular: Negative for chest pain, palpitations and peripheral edema.   Gastrointestinal: Negative for abdominal pain, constipation, diarrhea, heartburn, hematochezia and nausea.   Breasts:  Negative for tenderness, breast mass and discharge.   Genitourinary: Negative for dysuria, frequency, genital sores, hematuria, pelvic pain, urgency, vaginal bleeding and vaginal discharge.   Musculoskeletal: Positive for arthralgias. Negative for joint swelling and myalgias.   Skin: Negative for rash.   Neurological: Negative for dizziness, weakness, headaches and paresthesias.   Psychiatric/Behavioral: Negative for mood changes. The patient is not nervous/anxious.         OBJECTIVE:   /70   Pulse 70   Temp 98  F (36.7  C) (Oral)   Resp 16   Ht 1.651 m (5' 5\")   Wt 68.5 kg (151 lb)   LMP 09/20/2019   SpO2 98%   BMI 25.13 kg/m    Physical Exam  GENERAL APPEARANCE: healthy, alert and no distress  EYES: Eyes grossly normal to inspection, PERRL and conjunctivae and sclerae normal  HENT: ear canals and TM's normal, nose and mouth without ulcers or lesions, oropharynx clear and oral mucous membranes moist  NECK: no adenopathy, no asymmetry, masses, or scars and thyroid normal to palpation  RESP: lungs clear to auscultation - no rales, rhonchi or wheezes  BREAST: normal without masses, tenderness or nipple discharge and no palpable axillary masses or adenopathy  CV: regular rate and rhythm, normal S1 S2, no S3 or " S4, no murmur, click or rub, no peripheral edema and peripheral pulses strong  ABDOMEN: soft, nontender, no hepatosplenomegaly, no masses and bowel sounds normal  MS: no musculoskeletal defects are noted and gait is age appropriate without ataxia  SKIN: no suspicious lesions or rashes  NEURO: Normal strength and tone, sensory exam grossly normal, mentation intact and speech normal  PSYCH: mentation appears normal and affect normal/bright    Diagnostic Test Results:  Labs reviewed in Epic    ASSESSMENT/PLAN:     1. Routine general medical examination at a health care facility  - Lipid panel reflex to direct LDL Fasting; Future  - Comprehensive metabolic panel (BMP + Alb, Alk Phos, ALT, AST, Total. Bili, TP); Future  - Hemoglobin A1c; Future  - TSH with free T4 reflex; Future    2. Anxiety - stable, refills  - sertraline (ZOLOFT) 100 MG tablet; Take 1 tablet (100 mg) by mouth daily  Dispense: 90 tablet; Refill: 3      COUNSELING:  Reviewed preventive health counseling, as reflected in patient instructions        She reports that she has never smoked. She has never used smokeless tobacco.      Chelly Orozco MD  [facility]

## 2023-06-23 NOTE — PATIENT INSTRUCTIONS
Black cohosh - for hot flashes    2000 international unit(s) vitamin D3 daily  1200 mg calcium through diet first, then supplement    Collagen + biotin          Preventive Health Recommendations  Female Ages 50 - 64    Yearly exam: See your health care provider every year in order to  Review health changes.   Discuss preventive care.    Review your medicines if your doctor has prescribed any.    Get a Pap test every three years (unless you have an abnormal result and your provider advises testing more often).  If you get Pap tests with HPV test, you only need to test every 5 years, unless you have an abnormal result.   You do not need a Pap test if your uterus was removed (hysterectomy) and you have not had cancer.  You should be tested each year for STDs (sexually transmitted diseases) if you're at risk.   Have a mammogram every 1 to 2 years.  Have a colonoscopy at age 50, or have a yearly FIT test (stool test). These exams screen for colon cancer.    Have a cholesterol test every 5 years, or more often if advised.  Have a diabetes test (fasting glucose) every three years. If you are at risk for diabetes, you should have this test more often.   If you are at risk for osteoporosis (brittle bone disease), think about having a bone density scan (DEXA).    Shots: Get a flu shot each year. Get a tetanus shot every 10 years.    Nutrition:   Eat at least 5 servings of fruits and vegetables each day.  Eat whole-grain bread, whole-wheat pasta and brown rice instead of white grains and rice.  Get adequate Calcium and Vitamin D.     Lifestyle  Exercise at least 150 minutes a week (30 minutes a day, 5 days a week). This will help you control your weight and prevent disease.  Limit alcohol to one drink per day.  No smoking.   Wear sunscreen to prevent skin cancer.   See your dentist every six months for an exam and cleaning.  See your eye doctor every 1 to 2 years.

## 2023-06-24 LAB
ALBUMIN SERPL BCG-MCNC: 4.3 G/DL (ref 3.5–5.2)
ALP SERPL-CCNC: 53 U/L (ref 35–104)
ALT SERPL W P-5'-P-CCNC: 12 U/L (ref 0–50)
ANION GAP SERPL CALCULATED.3IONS-SCNC: 9 MMOL/L (ref 7–15)
AST SERPL W P-5'-P-CCNC: 26 U/L (ref 0–45)
BILIRUB SERPL-MCNC: 0.4 MG/DL
BUN SERPL-MCNC: 12.9 MG/DL (ref 6–20)
CALCIUM SERPL-MCNC: 9 MG/DL (ref 8.6–10)
CHLORIDE SERPL-SCNC: 106 MMOL/L (ref 98–107)
CHOLEST SERPL-MCNC: 178 MG/DL
CREAT SERPL-MCNC: 0.67 MG/DL (ref 0.51–0.95)
DEPRECATED HCO3 PLAS-SCNC: 25 MMOL/L (ref 22–29)
GFR SERPL CREATININE-BSD FRML MDRD: >90 ML/MIN/1.73M2
GLUCOSE SERPL-MCNC: 96 MG/DL (ref 70–99)
HDLC SERPL-MCNC: 54 MG/DL
LDLC SERPL CALC-MCNC: 105 MG/DL
NONHDLC SERPL-MCNC: 124 MG/DL
POTASSIUM SERPL-SCNC: 4.3 MMOL/L (ref 3.4–5.3)
PROT SERPL-MCNC: 6.7 G/DL (ref 6.4–8.3)
SODIUM SERPL-SCNC: 140 MMOL/L (ref 136–145)
TRIGL SERPL-MCNC: 97 MG/DL
TSH SERPL DL<=0.005 MIU/L-ACNC: 1.87 UIU/ML (ref 0.3–4.2)

## 2023-08-11 ENCOUNTER — HOSPITAL ENCOUNTER (OUTPATIENT)
Dept: MRI IMAGING | Facility: CLINIC | Age: 58
Discharge: HOME | End: 2023-08-11
Attending: INTERNAL MEDICINE | Admitting: INTERNAL MEDICINE
Payer: COMMERCIAL

## 2023-08-11 DIAGNOSIS — Z80.3 FHX: BREAST CANCER IN FIRST DEGREE RELATIVE: ICD-10-CM

## 2023-08-11 DIAGNOSIS — D05.02 NEOPLASM OF LEFT BREAST, PRIMARY TUMOR STAGING CATEGORY TIS: LOBULAR CARCINOMA IN SITU (LCIS): ICD-10-CM

## 2023-08-11 PROCEDURE — 255N000002 HC RX 255 OP 636: Performed by: INTERNAL MEDICINE

## 2023-08-11 PROCEDURE — 77049 MRI BREAST C-+ W/CAD BI: CPT

## 2023-08-11 PROCEDURE — A9585 GADOBUTROL INJECTION: HCPCS | Performed by: INTERNAL MEDICINE

## 2023-08-11 RX ORDER — GADOBUTROL 604.72 MG/ML
7.5 INJECTION INTRAVENOUS ONCE
Status: COMPLETED | OUTPATIENT
Start: 2023-08-11 | End: 2023-08-11

## 2023-08-11 RX ADMIN — GADOBUTROL 7 ML: 604.72 INJECTION INTRAVENOUS at 09:32

## 2023-08-16 ENCOUNTER — ONCOLOGY VISIT (OUTPATIENT)
Dept: ONCOLOGY | Facility: CLINIC | Age: 58
End: 2023-08-16
Attending: INTERNAL MEDICINE
Payer: COMMERCIAL

## 2023-08-16 VITALS
TEMPERATURE: 97.6 F | HEART RATE: 74 BPM | DIASTOLIC BLOOD PRESSURE: 76 MMHG | OXYGEN SATURATION: 98 % | RESPIRATION RATE: 16 BRPM | WEIGHT: 149.9 LBS | BODY MASS INDEX: 24.94 KG/M2 | SYSTOLIC BLOOD PRESSURE: 118 MMHG

## 2023-08-16 DIAGNOSIS — D05.02 NEOPLASM OF LEFT BREAST, PRIMARY TUMOR STAGING CATEGORY TIS: LOBULAR CARCINOMA IN SITU (LCIS): Primary | ICD-10-CM

## 2023-08-16 DIAGNOSIS — Z80.3 FHX: BREAST CANCER IN FIRST DEGREE RELATIVE: ICD-10-CM

## 2023-08-16 PROCEDURE — G0463 HOSPITAL OUTPT CLINIC VISIT: HCPCS | Performed by: INTERNAL MEDICINE

## 2023-08-16 PROCEDURE — 99214 OFFICE O/P EST MOD 30 MIN: CPT | Performed by: INTERNAL MEDICINE

## 2023-08-16 RX ORDER — TAMOXIFEN CITRATE 20 MG/1
20 TABLET ORAL DAILY
Qty: 90 TABLET | Refills: 3 | Status: SHIPPED | OUTPATIENT
Start: 2023-08-16 | End: 2024-05-28

## 2023-08-16 ASSESSMENT — PAIN SCALES - GENERAL: PAINLEVEL: NO PAIN (0)

## 2023-08-16 NOTE — LETTER
8/16/2023         RE: Brii Taylor  [address] [address]        Dear Colleague,    Thank you for referring your patient, Brii Taylor, to the [facility]. Please see a copy of my visit note below.    Brii Taylor is a 57-year-old patient who is here today for interim followup.     CHIEF COMPLAINT:   1.  LCIS of the left breast.  2.  Family history of breast cancer.  3.  Increased lifetime risk of breast cancer.     HISTORY OF PRESENT ILLNESS:  Brii is a 57-year-old postmenopausal patient.  She has got a diagnosis of LCIS of the left breast and also atypical ductal hyperplasia.  In addition to that, she has got a strong family history of breast cancer with a sister who had breast cancer at 38 and then again at 50.     Genetic testing was done, which was normal.  Because of Brii's lifetime risk of breast cancer, being greater than 25%, she sees me every 6 months and we do mammograms and MRI scan and she comes back today for interim followup.  She is feeling well today without any major problems.    I have reviewed her mammogram that was done in December 2022 and also a recent MRI scan that was done in August 2023.  Both of these were normal and I discussed them with her.    She continues on tamoxifen for prevention and is doing well on that.  She previously tried aromatase inhibitors but could not tolerate them because of joint problems.    REVIEW OF SYSTEMS:  A 12-point comprehensive review of systems is otherwise unremarkable.     SOCIAL HISTORY:  The patient is a nonsmoker.  She has 2 adult children.  She is an avid exerciser.     MEDICATIONS AND ALLERGIES:  Outlined in the nursing records.     PHYSICAL EXAMINATION:    GENERAL:  She is a well-appearing lady in no acute distress.  VITAL SIGNS:  Stable.  NECK:  Has no masses or goiter.  CHEST:  Clear to auscultation and percussion bilaterally.  HEART:  Sounds 1 2, normal, no added sounds, no  murmurs.  BREASTS:  Right breast normal, no palpable masses.  Left breast, status post left sided lumpectomy.  The scar looks good.  No palpable masses.  Left axilla negative.  GASTROINTESTINAL:  Abdomen is soft and nontender.  No hepatosplenomegaly.  EXTREMITIES:  Legs without tenderness or edema.  NEUROLOGIC:  The patient is alert and oriented x 3.      Data review:  I personally reviewed her mammogram that was done in December 2022 and an MRI scan that was done in August 2023  I have reviewed blood work that she had done on 6/23/2023 and that was within normal limits.      Impression and plan:  Brii is a 57-year-old patient with a history of lobular carcinoma in situ as well as atypical ductal hyperplasia of the left breast.  She also has a family history of breast cancer as outlined above.  She would be at high risk of developing breast cancer and for that reason we are screening her with a mammogram and MRI scan alternating every 6 months and we also have her on tamoxifen which she seems to be tolerating quite well.  While she is on the tamoxifen she will see me every 6 months.  All of the above has been discussed with her and I have written her up for her next mammogram.  Patient is in agreement with the above plan.    Total time spent today 30 minutes.    Aubrie Barnes MD    Again, thank you for allowing me to participate in the care of your patient.        Sincerely,        Aubrie Barnes MD

## 2023-08-16 NOTE — PROGRESS NOTES
Brii Taylor is a 57-year-old patient who is here today for interim followup.     CHIEF COMPLAINT:   1.  LCIS of the left breast.  2.  Family history of breast cancer.  3.  Increased lifetime risk of breast cancer.     HISTORY OF PRESENT ILLNESS:  Brii is a 57-year-old postmenopausal patient.  She has got a diagnosis of LCIS of the left breast and also atypical ductal hyperplasia.  In addition to that, she has got a strong family history of breast cancer with a sister who had breast cancer at 38 and then again at 50.     Genetic testing was done, which was normal.  Because of Brii's lifetime risk of breast cancer, being greater than 25%, she sees me every 6 months and we do mammograms and MRI scan and she comes back today for interim followup.  She is feeling well today without any major problems.    I have reviewed her mammogram that was done in December 2022 and also a recent MRI scan that was done in August 2023.  Both of these were normal and I discussed them with her.    She continues on tamoxifen for prevention and is doing well on that.  She previously tried aromatase inhibitors but could not tolerate them because of joint problems.    REVIEW OF SYSTEMS:  A 12-point comprehensive review of systems is otherwise unremarkable.     SOCIAL HISTORY:  The patient is a nonsmoker.  She has 2 adult children.  She is an avid exerciser.     MEDICATIONS AND ALLERGIES:  Outlined in the nursing records.     PHYSICAL EXAMINATION:    GENERAL:  She is a well-appearing lady in no acute distress.  VITAL SIGNS:  Stable.  NECK:  Has no masses or goiter.  CHEST:  Clear to auscultation and percussion bilaterally.  HEART:  Sounds 1 2, normal, no added sounds, no murmurs.  BREASTS:  Right breast normal, no palpable masses.  Left breast, status post left sided lumpectomy.  The scar looks good.  No palpable masses.  Left axilla negative.  GASTROINTESTINAL:  Abdomen is soft and nontender.  No hepatosplenomegaly.  EXTREMITIES:   Legs without tenderness or edema.  NEUROLOGIC:  The patient is alert and oriented x 3.      Data review:  I personally reviewed her mammogram that was done in December 2022 and an MRI scan that was done in August 2023  I have reviewed blood work that she had done on 6/23/2023 and that was within normal limits.      Impression and plan:  Brii is a 57-year-old patient with a history of lobular carcinoma in situ as well as atypical ductal hyperplasia of the left breast.  She also has a family history of breast cancer as outlined above.  She would be at high risk of developing breast cancer and for that reason we are screening her with a mammogram and MRI scan alternating every 6 months and we also have her on tamoxifen which she seems to be tolerating quite well.  While she is on the tamoxifen she will see me every 6 months.  All of the above has been discussed with her and I have written her up for her next mammogram.  Patient is in agreement with the above plan.    Total time spent today 30 minutes.    Aubrie Barnes MD

## 2023-08-17 ENCOUNTER — PATIENT OUTREACH (OUTPATIENT)
Dept: ONCOLOGY | Facility: CLINIC | Age: 58
End: 2023-08-17
Payer: COMMERCIAL

## 2023-08-17 NOTE — PROGRESS NOTES
"Writer received call from Brii with concerns about a few months of ongoing urinary urgency. She reports that \"when I feel the need to go to the bathroom, I know that I need to get there quickly or I'll start dribbling.\" Brii denies any suprapubic or flank pain or pain/burning with urination. She denies any changes to her urine including cloudiness, odor, or blood. She does endorse some increasing hip pain that she discussed during clinic visit yesterday with Dr. Barnes. It was decided that this is likely a worsening of arthritis in Brii's low back.    Today Brii had an episode during a walk outside where she lost complete control of her bladder. She reports that she urinated before leaving the house, but 30 minutes into her walk she felt the urge to void and immediately lost control of her bladder. Brii is struggling with getting through her day to day activities with the ongoing urgency and incontinence.    Brii denies any major changes to diet, medications, or exercise patterns in the past few months. She does endorse some intermittent constipation, but notes that there's no ebb and flow to the urinary issues with regard to the constipation. Brii reports that she's been on tamoxifen for a few years now and is wondering if the medication could be causing some of these urinary changes.    Writer will route to care team for further advisement.    Melissa Ruff, RN, BSN, OCN  Nurse Care Coordinator  [facility] [place]  P: [phone]     F: [phone]  "

## 2023-08-18 DIAGNOSIS — R32 URINARY INCONTINENCE, UNSPECIFIED TYPE: Primary | ICD-10-CM

## 2023-08-18 NOTE — PROGRESS NOTES
Dr. Barnes and Alicja Jerry are recommending UA and MRI lumbar spine to rule out urgent/ emergent causes of Brii's urgency and incontinence. Orders placed. MRI scheduled for Saturday 8/26/23. Brii will plan to go to the hospital lab following MRI for walk-in UA. Writer will monitor for results.    Melissa Ruff RN, BSN, OCN  Nurse Care Coordinator  [facility] [place]  P: [phone]     F: [phone]

## 2023-08-21 ENCOUNTER — OFFICE VISIT (OUTPATIENT)
Dept: SLEEP MEDICINE | Facility: CLINIC | Age: 58
End: 2023-08-21
Payer: COMMERCIAL

## 2023-08-21 VITALS
HEART RATE: 64 BPM | WEIGHT: 151.3 LBS | BODY MASS INDEX: 25.18 KG/M2 | SYSTOLIC BLOOD PRESSURE: 102 MMHG | DIASTOLIC BLOOD PRESSURE: 69 MMHG | OXYGEN SATURATION: 98 %

## 2023-08-21 DIAGNOSIS — G47.33 OSA (OBSTRUCTIVE SLEEP APNEA): Primary | ICD-10-CM

## 2023-08-21 DIAGNOSIS — Z78.9 INTOLERANCE OF CONTINUOUS POSITIVE AIRWAY PRESSURE (CPAP) VENTILATION: ICD-10-CM

## 2023-08-21 PROCEDURE — 99213 OFFICE O/P EST LOW 20 MIN: CPT | Performed by: INTERNAL MEDICINE

## 2023-08-21 ASSESSMENT — SLEEP AND FATIGUE QUESTIONNAIRES
HOW LIKELY ARE YOU TO NOD OFF OR FALL ASLEEP IN A CAR, WHILE STOPPED FOR A FEW MINUTES IN TRAFFIC: SLIGHT CHANCE OF DOZING
HOW LIKELY ARE YOU TO NOD OFF OR FALL ASLEEP WHEN YOU ARE A PASSENGER IN A CAR FOR AN HOUR WITHOUT A BREAK: MODERATE CHANCE OF DOZING
HOW LIKELY ARE YOU TO NOD OFF OR FALL ASLEEP WHILE LYING DOWN TO REST IN THE AFTERNOON WHEN CIRCUMSTANCES PERMIT: HIGH CHANCE OF DOZING
HOW LIKELY ARE YOU TO NOD OFF OR FALL ASLEEP WHILE WATCHING TV: MODERATE CHANCE OF DOZING
HOW LIKELY ARE YOU TO NOD OFF OR FALL ASLEEP WHILE SITTING AND TALKING TO SOMEONE: SLIGHT CHANCE OF DOZING
HOW LIKELY ARE YOU TO NOD OFF OR FALL ASLEEP WHILE SITTING AND READING: HIGH CHANCE OF DOZING
HOW LIKELY ARE YOU TO NOD OFF OR FALL ASLEEP WHILE SITTING INACTIVE IN A PUBLIC PLACE: SLIGHT CHANCE OF DOZING
HOW LIKELY ARE YOU TO NOD OFF OR FALL ASLEEP WHILE SITTING QUIETLY AFTER LUNCH WITHOUT ALCOHOL: SLIGHT CHANCE OF DOZING

## 2023-08-21 NOTE — NURSING NOTE
"Chief Complaint   Patient presents with    Sleep Problem     CPAP Follow up       Initial /69   Pulse 64   Wt 68.6 kg (151 lb 4.8 oz)   LMP 09/20/2019   SpO2 98%   BMI 25.18 kg/m   Estimated body mass index is 25.18 kg/m  as calculated from the following:    Height as of 6/23/23: 1.651 m (5' 5\").    Weight as of this encounter: 68.6 kg (151 lb 4.8 oz).    Medication Reconciliation: complete    DME: Previously [facility], not currently using CPAP    ESS 14    ELVI 13    Ankur Pinon CMA (AAMA)  "

## 2023-08-21 NOTE — PATIENT INSTRUCTIONS
Obstructive sleep apnea:combination of positional therapy and dental appliance.  --Positional therapy during sleep: We discussed the options of FDA approved zzoma pillow(prescription has been provided if you are interested and needs to be submitted to the website: www.zzomaosa.com) and other devices of similar type that can be purchased through online resources without a prescription such as slumber bump or sleep noodle for sleep apnea.  --Please check with dentistry for feasibility to advance the oral appliance.     Please call our clinic at [phone] after the dental appliance is adequately adjusted, and you obtain the positional device, in order to schedule a repeat home sleep study which will be done while you are using  the dental appliance and positional device to check for the effectiveness of the combination therapy for sleep apnea.  Plan to communicate test  results via [employer].     Please avoid driving, operating any heavy machinery or other hazardous situations while drowsy or sleepy.

## 2023-08-22 NOTE — PROGRESS NOTES
[facility]  Sleep clinic follow-up visit note     Date: August 21, 2023    Chief complaint:  Follow-up of JUAN ALBERTO, discussed treatment options    Brii Taylor is a 57 year old female who presents to sleep clinic for follow-up of previously diagnosed  obstructive sleep apnea.    At the time of her last sleep clinic visit in November 9, 2022 she was using oral appliance. During the clinic visit, she reported that there has been a little improvement with oral appliance use in terms of sleep quality but not up to the level of her expectation.  She also reported during that visit  nonrestorative sleep, snoring, fatigue and excessive daytime sleepiness, despite using the oral appliance.  DME orders were generated for the CPAP treatment. She was set up at [facility] on December 6, 2022. Patient received a Resmed Airsense 11 Pressures were set at 5-15 cm H2O. She tried several different mask options and did not find a mask comfortable and was intolerant and  discontinued the CPAP use.    She returned back to using her previous oral appliance during sleep.  She uses a tennis ball T-shirt but does not keep it on all night and  manages to take the ball off during the night . There are reports of snoring with the oral appliance, but her  reports that the snoring is not as loud.  There have not been any reports of observed apneas during sleep or choking with the use of the oral appliance.    She was wondering about the next treatment options and also about getting a sleep study with the oral appliance to check the effectiveness of treatment for sleep apnea since it was never obtained.    Previous sleep study report:     Home sleep study report:   Study Date: 8/12/2020  Analysis Time: 587.3 minutes     Respiration:   Sleep Associated Hypoxemia: sustained hypoxemia was not present. Baseline oxygen saturation was 96.1 %.  Time with saturation less than or equal to 88% was 1.0 minutes. The lowest oxygen saturation  was 86 %.   Snoring: Snoring was present.  Respiratory events: The home study revealed a presence of 155 obstructive apneas and 37 mixed and central apneas. There were 11 hypopneas resulting in a combined apnea/hypopnea index [AHI] of 20.7 events per hour.  AHI was 30.4 per hour supine, n/a per hour prone, 16.1 per hour on left side, and 2.9 per hour on right side.   Pattern: Excluding events noted above, respiratory rate and pattern was Normal.     Position: Percent of time spent: supine -57.9 %, prone - 0%, on left -13.9 %, on right -27.8 %.     Heart Rate: By pulse oximetry normal rate was noted.      Assessment:   Moderate obstructive sleep apnea, pronounced during supine sleep position.  Sleep associated hypoxemia was not present.      Past medical/surgical history, family history, social history, medications and allergies were reviewed.      Problem list:  Patient Active Problem List   Diagnosis    Anxiety    CARDIOVASCULAR SCREENING; LDL GOAL LESS THAN 160    Onychomycosis    Finger pain, right    Plantar warts    Pulmonary nodules    Mitral valve prolapse    Atypical lobular hyperplasia (ALH) of left breast    Obstructive sleep apnea syndrome    Breast neoplasm, Tis (LCIS)    Snoring    Irritable bowel syndrome    Articular disc disorder of temporomandibular joint               Physical Examination:   /69   Pulse 64   Wt 68.6 kg (151 lb 4.8 oz)   LMP 09/20/2019   SpO2 98%   BMI 25.18 kg/m    General: Pleasant. Cooperative. In no apparent distress.  Pulmonary: Able to speak in full sentences easily. No cough or wheeze.   Neurologic: Alert, oriented x3.  Psychiatric: Mood euthymic. Affect congruent with full range and intensity.     ASSESSMENT/PLAN:  Obstructive sleep apnea: Pt reported intolerance to several masks and discontinued CPAP treatment. She returned back to using her previous oral appliance during sleep and though she uses a tennis ball t shirt but that has not been working for  "her.    After discussing the various treatment options for sleep apnea including referral to sleep ENT surgeon to discuss upper airway surgical options including inspire treatment, the following recommendations were made:  A) slightly advancing the setting of the oral appliance by following the instructions that were provided by her dentist if she can tolerate or follow-up with dentistry to further advance the oral appliance  B) discontinue the tennis ball T-shirt and instead obtain other options for positional therapy during sleep such as Zzoma pillow or slumber bump.  Prescription was provided for the positional device.  C) patient instructed to call our clinic at [phone] after the oral appliance is adequately adjusted, and after she obtains the positional device of her choice, in order to schedule a repeat home sleep study which will be done while she is using  the oral appliance and positional device to check for the effectiveness of the combination therapy for sleep apnea. Orders for future HST generated in epic. Plan to communicate test results via [employer].  If there is persistent JUAN ALBERTO, will consider referral to sleep ENT provider as the next option.    Encouraged to continue following healthy diet, and regular exercise.     Patient was strongly advised to avoid driving, operating any heavy machinery or other hazardous situations while drowsy or sleepy.  Patient was counseled on the importance of driving while alert, to pull over if drowsy, or nap before getting into the vehicle if sleepy.       The above note was dictated using voice recognition software. Although reviewed after completion, some word and grammatical error may remain . Please contact the author for any clarifications.      \" Total time spent was 24 minutes  for this appointment on this date of service which include time spent before, during and after the visit for chart review, patient care, counseling and coordination of care. Including " "documentation\"      Tyrone Gusman MD  [facility]  [address] , [place] [address]   "

## 2023-08-26 ENCOUNTER — HOSPITAL ENCOUNTER (OUTPATIENT)
Dept: MRI IMAGING | Facility: CLINIC | Age: 58
Discharge: HOME | End: 2023-08-26
Attending: PHYSICIAN ASSISTANT | Admitting: PHYSICIAN ASSISTANT
Payer: COMMERCIAL

## 2023-08-26 DIAGNOSIS — R32 URINARY INCONTINENCE, UNSPECIFIED TYPE: ICD-10-CM

## 2023-08-26 PROCEDURE — A9585 GADOBUTROL INJECTION: HCPCS | Performed by: PHYSICIAN ASSISTANT

## 2023-08-26 PROCEDURE — 72158 MRI LUMBAR SPINE W/O & W/DYE: CPT

## 2023-08-26 PROCEDURE — 255N000002 HC RX 255 OP 636: Performed by: PHYSICIAN ASSISTANT

## 2023-08-26 RX ORDER — GADOBUTROL 604.72 MG/ML
7 INJECTION INTRAVENOUS ONCE
Status: COMPLETED | OUTPATIENT
Start: 2023-08-26 | End: 2023-08-26

## 2023-08-26 RX ADMIN — GADOBUTROL 7 ML: 604.72 INJECTION INTRAVENOUS at 09:09

## 2023-08-28 ENCOUNTER — PATIENT OUTREACH (OUTPATIENT)
Dept: ONCOLOGY | Facility: CLINIC | Age: 58
End: 2023-08-28
Payer: COMMERCIAL

## 2023-08-28 NOTE — PROGRESS NOTES
Message left for patient to call back to discuss note from Alicja regarding recent imaging and ask patient about the UA. It does not appear that she left a sample for testing.     Rosalino Silverman, Alicja KNIGHT, Melissa Bridges, RN  No acute concerns. Some stenosis in the lumbar spine. Would recommend Urology follow up for urinary incontinence.  Marietta Sebastian RN on 8/28/2023 at 1:58 PM

## 2023-09-01 NOTE — TELEPHONE ENCOUNTER
Writer called patient to update her of the results from her recent imaging (please see note by ANGELITA Mcmanus on 8/28/23). Patient stated understanding of the result note, per Alicja Jerry PA-C. Patient does endorse on-going urgency with incontinence. She does report that she did not submit a urine sample for the ordered UA, and writer strongly advised her to do so in order to rule out infection. Patient denies other symptoms such as burning or pain with urination, fever, or bloody/cloudy urine. Writer educated patient that there may still be an infection present even without these symptoms present, and the next step in her care would be to rule out infection at this point. Writer advised, that if her UA does not show infection, then her care would need to be escalated to the Urology team for follow up. Patient stated understanding, and that she will plan to drop of urine sample at the [facility] so that the UA can be completed.     Writer will route to ANGELITA TorresCC for Dr. Barnes, to monitor for UA results and assist with Urology referral, if needed.     Shannen Hui RN on 9/1/2023 at 12:20 PM

## 2023-09-13 ENCOUNTER — OFFICE VISIT (OUTPATIENT)
Dept: URGENT CARE | Facility: URGENT CARE | Age: 58
End: 2023-09-13
Payer: COMMERCIAL

## 2023-09-13 VITALS
DIASTOLIC BLOOD PRESSURE: 76 MMHG | OXYGEN SATURATION: 98 % | TEMPERATURE: 97.6 F | RESPIRATION RATE: 16 BRPM | SYSTOLIC BLOOD PRESSURE: 118 MMHG | HEART RATE: 68 BPM

## 2023-09-13 DIAGNOSIS — R07.89 CHEST WALL PAIN: Primary | ICD-10-CM

## 2023-09-13 PROCEDURE — 99213 OFFICE O/P EST LOW 20 MIN: CPT | Performed by: FAMILY MEDICINE

## 2023-09-13 NOTE — PATIENT INSTRUCTIONS
Today your chest pain appears to be related to musculoskeletal issues, not your heart or your lungs.    Consider seeing a chiropractor and/or massage therapist to help rebalance the muscles.

## 2023-09-14 NOTE — PROGRESS NOTES
"ASSESSMENT:    ICD-10-CM    1. Chest wall pain  R07.89         Low suspicion for cardiac and respiratory processes given reproducibility of chest wall pain and absence of chest pressure, shortness of breath, nausea or diaphoresis.      PLAN:  Continue to monitor symptoms. Discussed need for ER evaluation if symptoms like chest pressure, shortness of breath or diaphoresis develop. In meantime recommend chiropractic or massage therapy to alleviate chest wall and back pain.      SUBJECTIVE:  Brii Taylor is a 57 year old female who presents to urgent care with 2 weeks of left sided chest wall, back, and left shoulder pain. She describes the pain as constant and \"nagging\". Pain is not associated with shortness of breath, nausea, or diaphoresis. She denies palpitations. Pain worse with palpation of the area. Has tried ibuprofen with minimal improvement.      OBJECTIVE:  /76 (BP Location: Right arm, Patient Position: Sitting, Cuff Size: Adult Regular)   Pulse 68   Temp 97.6  F (36.4  C) (Tympanic)   Resp 16   LMP 09/20/2019   SpO2 98%   Physical Exam  Constitutional:       General: She is not in acute distress.     Appearance: Normal appearance.   Cardiovascular:      Rate and Rhythm: Normal rate and regular rhythm.      Pulses: Normal pulses.      Heart sounds: Normal heart sounds.   Pulmonary:      Effort: Pulmonary effort is normal.      Breath sounds: Normal breath sounds.   Musculoskeletal:      Cervical back: Normal range of motion.   Neurological:      Mental Status: She is alert.      Tender in upper left chest mid clavicular line.  No fluctuance.  No palpable muscle spasm here.  Tight muscles in left upper back (trapezius/levator/rhomboid).  No tenderness along the costochondral junctions on the left.  Multiple tender points in the L pec major.  No tender points in the L scalenes.  Shoulder AROM not significantly reduced.    "

## 2023-11-07 ENCOUNTER — DOCUMENTATION ONLY (OUTPATIENT)
Dept: SLEEP MEDICINE | Facility: CLINIC | Age: 58
End: 2023-11-07

## 2023-11-07 ENCOUNTER — OFFICE VISIT (OUTPATIENT)
Dept: SLEEP MEDICINE | Facility: CLINIC | Age: 58
End: 2023-11-07
Attending: INTERNAL MEDICINE
Payer: COMMERCIAL

## 2023-11-07 DIAGNOSIS — G47.33 OSA (OBSTRUCTIVE SLEEP APNEA): ICD-10-CM

## 2023-11-07 PROCEDURE — G0399 HOME SLEEP TEST/TYPE 3 PORTA: HCPCS | Performed by: INTERNAL MEDICINE

## 2023-11-07 NOTE — PROGRESS NOTES
Pt is completing a home sleep test. Pt was instructed on how to put on the Noxturnal T3 device and associated equipment before going to bed and given the opportunity to practice putting it on before leaving the sleep center. Pt was reminded to bring the home sleep test kit back to the center tomorrow, at agreed upon time for download and reporting.   Alicia Mcgarry CMA on 11/7/2023 at 1:37 PM

## 2023-11-08 ENCOUNTER — DOCUMENTATION ONLY (OUTPATIENT)
Dept: SLEEP MEDICINE | Facility: CLINIC | Age: 58
End: 2023-11-08
Attending: INTERNAL MEDICINE
Payer: COMMERCIAL

## 2023-11-08 NOTE — PROGRESS NOTES
HST POST-STUDY QUESTIONNAIRE    What time did you go to bed?  10:15 pm  How long do you think it took to fall asleep?  15 min  What time did you wake up to start the day?  6:00 am  Did you get up during the night at all?  yes  If you woke up, do you remember approximately what time(s)? Maybe 1:45 and 4?  Did you have any difficulty with the equipment?  No  Did you us any type of treatment with this study?  Oral Appliance, back pillows to keep me off my back  Was the head of the bed elevated? No  Did you sleep in a recliner?  No  Did you stop using CPAP at least 3 days before this test?  NA  Any other information you'd like us to know?

## 2023-11-09 NOTE — PROGRESS NOTES
This HSAT was performed using a Noxturnal T3 device which recorded snore, sound, movement activity, body position, nasal pressure, oronasal thermal airflow, pulse, oximetry and both chest and abdominal respiratory effort. HSAT data was restricted to the time patient states they were in bed.     HSAT was scored using 1B 4% hypopnea rule.     HST AHI (Non-PAT): 20.7  Snoring was reported as mild.  Time with SpO2 below 89% was 3.7 minutes.   Overall signal quality was fair.     Pt will follow up with sleep provider to determine appropriate therapy.

## 2023-11-15 ENCOUNTER — MYC MEDICAL ADVICE (OUTPATIENT)
Dept: SLEEP MEDICINE | Facility: CLINIC | Age: 58
End: 2023-11-15
Payer: COMMERCIAL

## 2023-11-15 DIAGNOSIS — G47.33 OSA (OBSTRUCTIVE SLEEP APNEA): Primary | ICD-10-CM

## 2023-11-15 NOTE — PROCEDURES
"HOME SLEEP STUDY INTERPRETATION        Patient: Brii Taylor  MRN: [id number]  YOB: 1965  Study Date: 11/7/2023  PCP/Referring Provider: Chelly Orozco  Ordering Provider: Tyrone Gusman MD      Indications for Home Study: Brii Taylor is a 58 year old female with history of previously diagnosed obstructive sleep apnea  and history of CPAP intolerance, currently managed with oral appliance and positional therapy.  Home sleep study is obtained with the oral appliance and the positional device to check the effectiveness of combination treatment for JUAN ALBERTO.    Estimated body mass index is 25.18 kg/m  as calculated from the following:    Height as of 6/23/23: 1.651 m (5' 5\").    Weight as of 8/21/23: 68.6 kg (151 lb 4.8 oz).  [place] Sleepiness Scale: 14/24    Data: A full night home sleep study was performed recording the standard physiologic parameters including body position, movement, sound, nasal pressure, thermal oral airflow, chest and abdominal movements with respiratory inductance plethysmography, and oxygen saturation by pulse oximetry. Pulse rate was estimated by oximetry recording. This study was considered adequate based on > 4 hours of quality oximetry and respiratory recording. As specified by the AASM Manual for the Scoring of Sleep and Associated events, version 2.3, Rule VIII.D 1B, 4% oxygen desaturation scoring for hypopneas is used as a standard of care on all home sleep apnea testing.    Analysis Time: 428 minutes    Respiration:   Sleep Associated Hypoxemia: sustained hypoxemia was not present.  Average oxygen saturation was 94.2%.  Time with saturation less than or equal to 88% was 3.7 minutes. The lowest oxygen saturation was 85%.   Snoring: Snoring was present.  Respiratory events: The home study revealed a presence of 19 obstructive apneas and 2 mixed and central apneas. There were 126 hypopneas resulting in a combined apnea/hypopnea index " [AHI] of 20.7 events per hour.  AHI was 33.7 per hour supine, n/a  prone, 19.5 per hour on left side, and 20.6 per hour on right side.   Pattern: Excluding events noted above, respiratory rate and pattern was Normal.      Position: Percent of time spent: supine -4.6%, prone -0%, on left -49.5%, on right -45.6%.      Heart Rate: By pulse oximetry, the average pulse rate was normal at 63 bpm.  The minimum pulse rate was 52 bpm and the maximum pulse rate was 98 bpm.    Assessment:   Moderate obstructive sleep apnea was present despite the use of oral appliance and positional device.  Obstructive events were noted during both supine and lateral sleep positions, and were pronounced during supine sleep.  Sleep associated hypoxemia was not present.    Recommendations:  Consider (a) empiric treatment with auto titrating CPAP with pressure settings 5 to 15 cm water if the patient is amenable or (b) upper airway surgical options including possible Inspire implant through referral to sleep ENT provider.  Suggest optimizing sleep hygiene and avoiding sleep deprivation.  Weight management.      Diagnosis Code(s): Obstructive Sleep Apnea G47.33, Snoring R06.83    Electronically signed by: Tyrone Gusman MD, November 15, 2023   Diplomate, American Board of Internal Medicine, Sleep Medicine

## 2023-11-15 NOTE — PROCEDURES
"HOME SLEEP STUDY INTERPRETATION        Patient: Brii Taylor  MRN: [id number]  YOB: 1965  Study Date: 11/7/2023  PCP/Referring Provider: Chelly Orozco  Ordering Provider: Tyrone Gusman MD      Indications for Home Study: Brii Taylor is a 58 year old female with history of previously diagnosed obstructive sleep apnea  and history of CPAP intolerance, currently managed with oral appliance and positional therapy.  Home sleep study is obtained with the oral appliance and the positional device to check the effectiveness of combination treatment for JUAN ALBERTO.    Estimated body mass index is 25.18 kg/m  as calculated from the following:    Height as of 6/23/23: 1.651 m (5' 5\").    Weight as of 8/21/23: 68.6 kg (151 lb 4.8 oz).  [place] Sleepiness Scale: 14/24    Data: A full night home sleep study was performed recording the standard physiologic parameters including body position, movement, sound, nasal pressure, thermal oral airflow, chest and abdominal movements with respiratory inductance plethysmography, and oxygen saturation by pulse oximetry. Pulse rate was estimated by oximetry recording. This study was considered adequate based on > 4 hours of quality oximetry and respiratory recording. As specified by the AASM Manual for the Scoring of Sleep and Associated events, version 2.3, Rule VIII.D 1B, 4% oxygen desaturation scoring for hypopneas is used as a standard of care on all home sleep apnea testing.    Analysis Time: 428 minutes    Respiration:   Sleep Associated Hypoxemia: sustained hypoxemia was not present.  Average oxygen saturation was 94.2%.  Time with saturation less than or equal to 88% was 3.7 minutes. The lowest oxygen saturation was 85%.   Snoring: Snoring was present.  Respiratory events: The home study revealed a presence of 19 obstructive apneas and 2 mixed and central apneas. There were 126 hypopneas resulting in a combined apnea/hypopnea index " [AHI] of 20.7 events per hour.  AHI was 33.7 per hour supine, n/a  prone, 19.5 per hour on left side, and 20.6 per hour on right side.   Pattern: Excluding events noted above, respiratory rate and pattern was Normal.      Position: Percent of time spent: supine -4.6%, prone -0%, on left -49.5%, on right -45.6%.      Heart Rate: By pulse oximetry, the average pulse rate was normal at 63 bpm.  The minimum pulse rate was 52 bpm and the maximum pulse rate was 98 bpm.    Assessment:   Moderate obstructive sleep apnea was present despite the use of oral appliance and positional device.  Obstructive events were noted during both supine and lateral sleep positions, and were pronounced during supine sleep.  Sleep associated hypoxemia was not present.    Recommendations:  Consider (a) empiric treatment with auto titrating CPAP with pressure settings 5 to 15 cm water if the patient is amenable or (b) upper airway surgical options including possible Inspire implant through referral to sleep ENT provider.  Suggest optimizing sleep hygiene and avoiding sleep deprivation.  Weight management.      Diagnosis Code(s): Obstructive Sleep Apnea G47.33, Snoring R06.83    Electronically signed by: Tyrone Gusman MD, November 15, 2023   Diplomate, American Board of Internal Medicine, Sleep Medicine

## 2023-11-22 ENCOUNTER — TELEPHONE (OUTPATIENT)
Dept: SLEEP MEDICINE | Facility: CLINIC | Age: 58
End: 2023-11-22
Payer: COMMERCIAL

## 2023-11-22 NOTE — TELEPHONE ENCOUNTER
Pt has Fax number for records of sleep studies to be faxed to.    Fax: [phone] Provider name was not spelled out.  [place]?      Please pt know once faxed over

## 2023-12-07 ENCOUNTER — TRANSFERRED RECORDS (OUTPATIENT)
Dept: HEALTH INFORMATION MANAGEMENT | Facility: CLINIC | Age: 58
End: 2023-12-07
Payer: COMMERCIAL

## 2023-12-08 DIAGNOSIS — G47.33 OSA (OBSTRUCTIVE SLEEP APNEA): Primary | ICD-10-CM

## 2023-12-13 ENCOUNTER — TELEPHONE (OUTPATIENT)
Dept: SLEEP MEDICINE | Facility: CLINIC | Age: 58
End: 2023-12-13
Payer: COMMERCIAL

## 2023-12-13 DIAGNOSIS — G47.33 OSA (OBSTRUCTIVE SLEEP APNEA): Primary | ICD-10-CM

## 2023-12-13 NOTE — TELEPHONE ENCOUNTER
Pt needs and order for in clinic slepe study for continued treatment per her ins.    Pt had done HST in November- please put in an order for the in clinic sleep study

## 2023-12-14 NOTE — TELEPHONE ENCOUNTER
Chart reviewed. Patient pursuing alternatives to CPAP, in lab study needed.     Bhumi MEZA RN  [facility]

## 2024-01-02 ENCOUNTER — HOSPITAL ENCOUNTER (OUTPATIENT)
Dept: MAMMOGRAPHY | Facility: CLINIC | Age: 59
Discharge: HOME | End: 2024-01-02
Attending: INTERNAL MEDICINE | Admitting: INTERNAL MEDICINE
Payer: COMMERCIAL

## 2024-01-02 DIAGNOSIS — D05.02 NEOPLASM OF LEFT BREAST, PRIMARY TUMOR STAGING CATEGORY TIS: LOBULAR CARCINOMA IN SITU (LCIS): ICD-10-CM

## 2024-01-02 DIAGNOSIS — Z80.3 FHX: BREAST CANCER IN FIRST DEGREE RELATIVE: ICD-10-CM

## 2024-01-02 PROCEDURE — 77063 BREAST TOMOSYNTHESIS BI: CPT

## 2024-02-19 ENCOUNTER — MYC MEDICAL ADVICE (OUTPATIENT)
Dept: SLEEP MEDICINE | Facility: CLINIC | Age: 59
End: 2024-02-19
Payer: COMMERCIAL

## 2024-02-19 DIAGNOSIS — G47.33 OSA (OBSTRUCTIVE SLEEP APNEA): Primary | ICD-10-CM

## 2024-03-12 ENCOUNTER — TELEPHONE (OUTPATIENT)
Dept: SLEEP MEDICINE | Facility: CLINIC | Age: 59
End: 2024-03-12
Payer: COMMERCIAL

## 2024-03-21 ENCOUNTER — DOCUMENTATION ONLY (OUTPATIENT)
Dept: SLEEP MEDICINE | Facility: CLINIC | Age: 59
End: 2024-03-21
Payer: COMMERCIAL

## 2024-03-21 DIAGNOSIS — G47.33 OSA (OBSTRUCTIVE SLEEP APNEA): Primary | ICD-10-CM

## 2024-03-21 NOTE — PROGRESS NOTES
Patient was offered choice of vendor and chose [facility].  Patient Brii Taylor was set up at [facility] on March 21, 2024. Patient received a Resmed Airsense 11 Pressures were set at  5-15 cm H2O.   Patient s ramp is 4 cm H2O for Auto and FLEX/EPR is EPR, 2.  Patient received a Resmed Mask name: Airfit [id number]  Nasal mask size Small frame, Small cushion, heated tubing and heated humidifier.  Patient has the following compliance requirements: none  Patient has a follow up recommended with Dr. Gusman.    Tracy L Fahrenkamp

## 2024-03-25 ENCOUNTER — DOCUMENTATION ONLY (OUTPATIENT)
Dept: SLEEP MEDICINE | Facility: CLINIC | Age: 59
End: 2024-03-25
Payer: COMMERCIAL

## 2024-03-25 NOTE — PROGRESS NOTES
3 day Sleep therapy management telephone visit    Diagnostic AHI:  20.7 HST    Confirmed with patient at time of call- N/A Patient is still interested in STM service       Message left for patient to return call    Order settings:  CPAP MIN CPAP MAX   5 cm H2O 15 cm H2O       Device settings:  CPAP MIN CPAP MAX EPR RESMED SOFT RESPONSE SETTING   5.0 cm  H20 15.0 cm  H20 TWO OFF       Compliance 7 %    Assessment: Nightly usage most nights under four hours      Patient has the following upcoming sleep appts:  Future Sleep Appointments         Provider Department    5/15/2024 2:00 PM (Arrive by 1:45 PM) Tyrone Gusman MD [facility] [place]            Replacement device: No  STM ordered by provider: Yes     Total time spent on accessing and  interpreting remote patient PAP therapy data  10 minutes    Total time spent counseling, coaching  and reviewing PAP therapy data with patient  1 minutes    [id number] no

## 2024-04-05 ENCOUNTER — DOCUMENTATION ONLY (OUTPATIENT)
Dept: SLEEP MEDICINE | Facility: CLINIC | Age: 59
End: 2024-04-05
Payer: COMMERCIAL

## 2024-04-05 NOTE — PROGRESS NOTES
14  DAY STM VISIT    Diagnostic AHI:  HST: 20.7        Message left for patient to return call     Assessment: Pt meeting objective benchmarks.      Action plan: waiting for patient to return call.  and pt to have 30 day STM visit.      Device type: Auto-CPAP    PAP settings:  CPAP MIN CPAP MAX 95TH % PRESSURE EPR RESMED SOFT RESPONSE SETTING   5.0 cm  H20 15.0 cm  H20 9.3 cm  H20  TWO OFF     Mask type:  Full Face Mask    Objective measures: 14 day rolling measures   COMPLIANCE LEAK AHI AVERAGE USE IN MINUTES   85 % 11.82 2.26 365   GOAL >70% GOAL < 24 LPM GOAL <5 GOAL >240      Patient has the following upcoming sleep appts:  Future Sleep Appointments         Provider Department    5/15/2024 2:00 PM (Arrive by 1:45 PM) Tyrone Gusman MD [facility] [place]            Total time spent on accessing and interpreting remote patient PAP therapy data  10 minutes    Total time spent counseling, coaching  and reviewing PAP therapy data with patient  1 minute    [id number]  [id number]  no (3 day STM)

## 2024-04-10 ENCOUNTER — DOCUMENTATION ONLY (OUTPATIENT)
Dept: SLEEP MEDICINE | Facility: CLINIC | Age: 59
End: 2024-04-10
Payer: COMMERCIAL

## 2024-04-10 NOTE — PROGRESS NOTES
Spoke with pt. She had questions about if she is having mouth leak on her CPAP. Reviewed data and she had some periods of possible mouth leak. She does not have any dry mouth or feel any sleep disturbance from it. Her AHI is <5. She wondered if she should get a chinstrap. I advised her that in light of no symptoms and her therapy still being effective, she would not have to get a chinstrap. She is feeling more rested.

## 2024-04-24 ENCOUNTER — DOCUMENTATION ONLY (OUTPATIENT)
Dept: SLEEP MEDICINE | Facility: CLINIC | Age: 59
End: 2024-04-24
Payer: COMMERCIAL

## 2024-04-24 NOTE — PROGRESS NOTES
30 DAY STM VISIT    Diagnostic AHI:  HST: 20.7      PAP settings:  CPAP MIN CPAP MAX 95TH % PRESSURE EPR RESMED SOFT RESPONSE SETTING   5.0 cm  H20 15.0 cm  H20 9.7 cm  H20  TWO OFF     Device type: Auto-CPAP  Mask type:  Full Face Mask    Objective measures: 14 day rolling measures:    COMPLIANCE LEAK AHI AVERAGE USE IN MINUTES   85 % 11.14 2.36 372   GOAL >70% GOAL < 24 LPM GOAL <5 GOAL >240        Assessment: Pt meeting objective benchmarks.     Message left for patient to return call   Action plan: waiting for patient to return call.  Patient has the following upcoming sleep appts:  Future Sleep Appointments         Provider Department    5/15/2024 2:00 PM (Arrive by 1:45 PM) Tyrone Gusman MD [facility] [place]          Total time spent on accessing and interpreting remote patient PAP therapy data  10 minutes    Total time spent counseling, coaching  and reviewing PAP therapy data with patient  1 minutes     [id number] this call  [phone] no  at 3 or 14 day [facility]

## 2024-05-15 ENCOUNTER — VIRTUAL VISIT (OUTPATIENT)
Dept: SLEEP MEDICINE | Facility: CLINIC | Age: 59
End: 2024-05-15
Payer: COMMERCIAL

## 2024-05-15 VITALS — HEIGHT: 66 IN | WEIGHT: 146 LBS | BODY MASS INDEX: 23.46 KG/M2

## 2024-05-15 DIAGNOSIS — G47.33 OSA ON CPAP: Primary | ICD-10-CM

## 2024-05-15 PROCEDURE — 99214 OFFICE O/P EST MOD 30 MIN: CPT | Mod: 95 | Performed by: INTERNAL MEDICINE

## 2024-05-15 ASSESSMENT — SLEEP AND FATIGUE QUESTIONNAIRES
HOW LIKELY ARE YOU TO NOD OFF OR FALL ASLEEP WHILE SITTING QUIETLY AFTER LUNCH WITHOUT ALCOHOL: SLIGHT CHANCE OF DOZING
HOW LIKELY ARE YOU TO NOD OFF OR FALL ASLEEP WHILE SITTING AND TALKING TO SOMEONE: SLIGHT CHANCE OF DOZING
HOW LIKELY ARE YOU TO NOD OFF OR FALL ASLEEP WHEN YOU ARE A PASSENGER IN A CAR FOR AN HOUR WITHOUT A BREAK: HIGH CHANCE OF DOZING
HOW LIKELY ARE YOU TO NOD OFF OR FALL ASLEEP IN A CAR, WHILE STOPPED FOR A FEW MINUTES IN TRAFFIC: SLIGHT CHANCE OF DOZING
HOW LIKELY ARE YOU TO NOD OFF OR FALL ASLEEP WHILE WATCHING TV: MODERATE CHANCE OF DOZING
HOW LIKELY ARE YOU TO NOD OFF OR FALL ASLEEP WHILE LYING DOWN TO REST IN THE AFTERNOON WHEN CIRCUMSTANCES PERMIT: MODERATE CHANCE OF DOZING
HOW LIKELY ARE YOU TO NOD OFF OR FALL ASLEEP WHILE SITTING AND READING: MODERATE CHANCE OF DOZING
HOW LIKELY ARE YOU TO NOD OFF OR FALL ASLEEP WHILE SITTING INACTIVE IN A PUBLIC PLACE: SLIGHT CHANCE OF DOZING

## 2024-05-15 ASSESSMENT — PAIN SCALES - GENERAL: PAINLEVEL: SEVERE PAIN (7)

## 2024-05-15 NOTE — PROGRESS NOTES
Virtual Visit Details    Type of service:  Video Visit     Originating Location (pt. Location): Home    Distant Location (provider location):  Off-site  Platform used for Video Visit: [facility]  Sleep clinic follow-up visit note   Date: 5/15/24       Chief complaint: Follow-up of JUAN ALBERTO, review CPAP compliance    Brii Taylor is a 58 year old female who presents to sleep clinic today for follow-up of previously diagnosed obstructive sleep apnea managed with CPAP therapy.    She has history of CPAP intolerance and  her JUAN ALBERTO was being managed with oral appliance and positional therapy.  Home sleep study report shown below from 11/7/2023 was obtained with oral appliance and positional device that showed persistent moderate degree of sleep apnea.  Patient initially was interested in pursuing inspire treatment but decided later that she does not want the inspire but wanted to reconsider CPAP device.  She obtained CPAP device through [facility] in March 2024.    She  was set up at [facility] on March 21, 2024. Patient received a Resmed Airsense 11 Pressures were set at  5-15 cm H2O.   Patient s ramp is 4 cm H2O for Auto and FLEX/EPR is EPR, 2.  Patient received a Resmed Mask name: Airfit N30i  Nasal mask size Small frame, Small cushion, heated tubing and heated humidifier     She reports using the CPAP regularly during sleep. She is using nasal pillow mask-she reports that the mask is quiet comfortable, though there are occasional air leaks which she attributes to mouth opening.  There are no reports of snoring, apneas or awakenings due to gasping for air with the use of the CPAP.  She reports better sleep quality with the device use.  She reports waking up feeling comparatively more rested in the morning, feels more alert during the day and has not needed  naps during the day unlike before.  There are no reports of excessive daytime sleepiness or drowsiness while driving.         Previous sleep study report (Home sleep study)   #1 Home sleep study report:   Study Date: 8/12/2020  Analysis Time: 587.3 minutes     Respiration:   Sleep Associated Hypoxemia: sustained hypoxemia was not present. Baseline oxygen saturation was 96.1 %.  Time with saturation less than or equal to 88% was 1.0 minutes. The lowest oxygen saturation was 86 %.   Snoring: Snoring was present.  Respiratory events: The home study revealed a presence of 155 obstructive apneas and 37 mixed and central apneas. There were 11 hypopneas resulting in a combined apnea/hypopnea index [AHI] of 20.7 events per hour.  AHI was 30.4 per hour supine, n/a per hour prone, 16.1 per hour on left side, and 2.9 per hour on right side.   Pattern: Excluding events noted above, respiratory rate and pattern was Normal.     Position: Percent of time spent: supine -57.9 %, prone - 0%, on left -13.9 %, on right -27.8 %.     Heart Rate: By pulse oximetry normal rate was noted.      Assessment:   Moderate obstructive sleep apnea, pronounced during supine sleep position.  Sleep associated hypoxemia was not present.      #2 Home sleep study report   Study Date: 11/7/2023    Analysis Time: 428 minutes   Respiration:   Sleep Associated Hypoxemia: sustained hypoxemia was not present.  Average oxygen saturation was 94.2%.  Time with saturation less than or equal to 88% was 3.7 minutes. The lowest oxygen saturation was 85%.   Snoring: Snoring was present.  Respiratory events: The home study revealed a presence of 19 obstructive apneas and 2 mixed and central apneas. There were 126 hypopneas resulting in a combined apnea/hypopnea index [AHI] of 20.7 events per hour.  AHI was 33.7 per hour supine, n/a  prone, 19.5 per hour on left side, and 20.6 per hour on right side.   Pattern: Excluding events noted above, respiratory rate and pattern was Normal.  Position: Percent of time spent: supine -4.6%, prone -0%, on left -49.5%, on right -45.6%.   Heart Rate: By  pulse oximetry, the average pulse rate was normal at 63 bpm.  The minimum pulse rate was 52 bpm and the maximum pulse rate was 98 bpm.   Assessment:   Moderate obstructive sleep apnea was present despite the use of oral appliance and positional device.  Obstructive events were noted during both supine and lateral sleep positions, and were pronounced during supine sleep.  Sleep associated hypoxemia was not present.       CPAP compliance download :  ResMed   Auto-PAP 5.0 - 15.0 cmH2O 30 day usage data:    90% of days with > 4 hours of use. 1/30 days with no use.   Average use 375 minutes per day.   95%ile Leak 13 L/min.   CPAP 95% pressure 9.8 cm.   AHI 2.22 events per hour.      Past medical/surgical history, family history, social history, medications and allergies were reviewed.       Problem list:  Patient Active Problem List   Diagnosis    Anxiety    CARDIOVASCULAR SCREENING; LDL GOAL LESS THAN 160    Onychomycosis    Finger pain, right    Plantar warts    Pulmonary nodules    Mitral valve prolapse    Atypical lobular hyperplasia (ALH) of left breast    Obstructive sleep apnea syndrome    Breast neoplasm, Tis (LCIS)    Snoring    Irritable bowel syndrome    Articular disc disorder of temporomandibular joint               Physical Examination:   General: Pleasant. Cooperative. In no apparent distress.  Pulmonary: Able to speak in full sentences easily. No cough or wheeze.   Neurologic: Alert, oriented x3.  Psychiatric: Mood euthymic. Affect congruent with full range and intensity.     ASSESSMENT/PLAN:  Obstructive sleep apnea: Pt reports adequate compliance with CPAP and reports positive benefits with CPAP treatment. Based on compliance measures, JUAN ALBERTO is adequately controlled with CPAP at the current settings.    DME orders were generated for renewal of CPAP supplies.  She was instructed to follow-up with [facility] medical DME provider to obtain mask fit optimization to alleviate the air leaks.  Recommended to  "continue using the CPAP regularly during sleep and getting the supplies for the CPAP replaced regularly.   Patient instructed to remember to bring PAP with her if hospitalized and if anticipating procedure that requires sedation/surgery to be sure to discuss with the provider/surgeon that she has sleep apnea and uses PAP therapy including with the upcoming hip surgery in July 2024 .    We discussed weight management with healthy diet, and exercise.     Patient was strongly advised to avoid driving, operating any heavy machinery or other hazardous situations while drowsy or sleepy.  Patient was counseled on the importance of driving while alert, to pull over if drowsy, or nap before getting into the vehicle if sleepy.     Plan is to follow-up with sleep clinic via video visit in 1 year or sooner if any concerns.    The above note was dictated using voice recognition software. Although reviewed after completion, some word and grammatical error may remain . Please contact the author for any clarifications.      \" Total time spent was 30 minutes  for this appointment on this date of service which include time spent before, during and after the visit for chart review, patient care, counseling and coordination of care. Including documentation\"       Tyrone Gusman MD  [facility]  [address] , [place] [address]     "

## 2024-05-28 DIAGNOSIS — Z80.3 FHX: BREAST CANCER IN FIRST DEGREE RELATIVE: ICD-10-CM

## 2024-05-28 DIAGNOSIS — D05.02 NEOPLASM OF LEFT BREAST, PRIMARY TUMOR STAGING CATEGORY TIS: LOBULAR CARCINOMA IN SITU (LCIS): ICD-10-CM

## 2024-05-28 DIAGNOSIS — F41.9 ANXIETY: ICD-10-CM

## 2024-05-28 RX ORDER — TAMOXIFEN CITRATE 20 MG/1
20 TABLET ORAL DAILY
Qty: 90 TABLET | Refills: 3 | Status: SHIPPED | OUTPATIENT
Start: 2024-05-28 | End: 2024-08-19

## 2024-05-28 NOTE — TELEPHONE ENCOUNTER
Signed Prescriptions:                        Disp   Refills    tamoxifen (NOLVADEX) 20 MG tablet          90 tab*3        Sig: Take 1 tablet (20 mg) by mouth daily  Authorizing Provider: BRENDA SWANSON, RN, BSN, OCN, CBCN  Nurse Care Coordinator  [facility]  P: [phone]     F: [phone]

## 2024-05-28 NOTE — TELEPHONE ENCOUNTER
Pending Prescriptions:                       Disp   Refills    tamoxifen (NOLVADEX) 20 MG tablet         90 tab*3            Sig: Take 1 tablet (20 mg) by mouth daily          Last Written Prescription Date:  8/16/23  Last Fill Quantity: 90,   # refills: 3  Last Office Visit: 8/16/23 with Dr. Barnes  Future Office visit:   8/28/24 with Dr. Barnes    Routing refill request to provider for review/approval.    Melissa Ruff, RN, BSN, OCN, CBCN  Nurse Care Coordinator  [facility]  P: [phone]     F: [phone]

## 2024-05-29 RX ORDER — SERTRALINE HYDROCHLORIDE 100 MG/1
100 TABLET, FILM COATED ORAL DAILY
Qty: 90 TABLET | Refills: 0 | Status: SHIPPED | OUTPATIENT
Start: 2024-05-29

## 2024-06-21 ENCOUNTER — DOCUMENTATION ONLY (OUTPATIENT)
Dept: HOME HEALTH SERVICES | Facility: CLINIC | Age: 59
End: 2024-06-21
Payer: COMMERCIAL

## 2024-06-21 NOTE — PROGRESS NOTES
Patient came to [place] for mask fitting appointment on June 21, 2024. Patient requested to switch masks because soreness/skin irritation. Discussed the following masks: Resmed Airfit/touch F20;Resmed Airfit F30i; Respironics Dreamwear Full Face; Head & Paykel Chuck; Head & Paykel Vitera.     Patient selected a Head & Paykel Mask name: Chuck Full Face mask size X-Small, Standard

## 2024-07-28 ENCOUNTER — MYC MEDICAL ADVICE (OUTPATIENT)
Dept: ONCOLOGY | Facility: CLINIC | Age: 59
End: 2024-07-28
Payer: COMMERCIAL

## 2024-07-28 DIAGNOSIS — N93.9 VAGINAL BLEEDING: Primary | ICD-10-CM

## 2024-07-29 NOTE — TELEPHONE ENCOUNTER
Called pt with recommendations.    Alicja Samuels, EDITH  You8 minutes ago (9:38 AM)       Possibly from tamoxifen.  Please have her hold it for now and we need a pelvic ultrasound, I will order now.   Pt verbalized understanding. Pt currently at another apt and will call to schedule US later today. Contact information provided to pt on how to schedule US.

## 2024-07-29 NOTE — TELEPHONE ENCOUNTER
S-(situation): pt reporting 2-3 episodes of spotting over the past 2 months    B-(background): being seen for follow up of L breast cancer, on Tamoxifen for multiple years with no previous bleeding    A-(assessment): has had 2-3 episodes of spotting over the last 2 months, spotting does not occur for more than one day before it stops. Denies pain, fever, other vaginal discharge, or any other associated symptoms. Wondering if she should be seen for this, or if it could be related to the Tamoxifen.     R-(recommendations): Will defer to provider.

## 2024-08-03 ENCOUNTER — HEALTH MAINTENANCE LETTER (OUTPATIENT)
Age: 59
End: 2024-08-03

## 2024-08-11 DIAGNOSIS — D05.02 NEOPLASM OF LEFT BREAST, PRIMARY TUMOR STAGING CATEGORY TIS: LOBULAR CARCINOMA IN SITU (LCIS): Primary | ICD-10-CM

## 2024-08-12 ENCOUNTER — LAB (OUTPATIENT)
Dept: INFUSION THERAPY | Facility: CLINIC | Age: 59
End: 2024-08-12
Attending: INTERNAL MEDICINE
Payer: COMMERCIAL

## 2024-08-12 ENCOUNTER — HOSPITAL ENCOUNTER (OUTPATIENT)
Dept: MRI IMAGING | Facility: CLINIC | Age: 59
Discharge: HOME | End: 2024-08-12
Attending: INTERNAL MEDICINE | Admitting: INTERNAL MEDICINE
Payer: COMMERCIAL

## 2024-08-12 DIAGNOSIS — D05.02 NEOPLASM OF LEFT BREAST, PRIMARY TUMOR STAGING CATEGORY TIS: LOBULAR CARCINOMA IN SITU (LCIS): ICD-10-CM

## 2024-08-12 DIAGNOSIS — Z80.3 FHX: BREAST CANCER IN FIRST DEGREE RELATIVE: ICD-10-CM

## 2024-08-12 LAB
ALBUMIN SERPL BCG-MCNC: 4.2 G/DL (ref 3.5–5.2)
ALP SERPL-CCNC: 83 U/L (ref 40–150)
ALT SERPL W P-5'-P-CCNC: 12 U/L (ref 0–50)
ANION GAP SERPL CALCULATED.3IONS-SCNC: 11 MMOL/L (ref 7–15)
AST SERPL W P-5'-P-CCNC: 17 U/L (ref 0–45)
BASOPHILS # BLD AUTO: 0.1 10E3/UL (ref 0–0.2)
BASOPHILS NFR BLD AUTO: 1 %
BILIRUB SERPL-MCNC: 0.3 MG/DL
BUN SERPL-MCNC: 12.6 MG/DL (ref 6–20)
CALCIUM SERPL-MCNC: 9.4 MG/DL (ref 8.8–10.4)
CHLORIDE SERPL-SCNC: 104 MMOL/L (ref 98–107)
CREAT SERPL-MCNC: 0.63 MG/DL (ref 0.51–0.95)
EGFRCR SERPLBLD CKD-EPI 2021: >90 ML/MIN/1.73M2
EOSINOPHIL # BLD AUTO: 0.2 10E3/UL (ref 0–0.7)
EOSINOPHIL NFR BLD AUTO: 4 %
ERYTHROCYTE [DISTWIDTH] IN BLOOD BY AUTOMATED COUNT: 13.3 % (ref 10–15)
GLUCOSE SERPL-MCNC: 101 MG/DL (ref 70–99)
HCO3 SERPL-SCNC: 25 MMOL/L (ref 22–29)
HCT VFR BLD AUTO: 36.5 % (ref 35–47)
HGB BLD-MCNC: 11.8 G/DL (ref 11.7–15.7)
IMM GRANULOCYTES # BLD: 0 10E3/UL
IMM GRANULOCYTES NFR BLD: 0 %
LYMPHOCYTES # BLD AUTO: 1.6 10E3/UL (ref 0.8–5.3)
LYMPHOCYTES NFR BLD AUTO: 35 %
MCH RBC QN AUTO: 28.5 PG (ref 26.5–33)
MCHC RBC AUTO-ENTMCNC: 32.3 G/DL (ref 31.5–36.5)
MCV RBC AUTO: 88 FL (ref 78–100)
MONOCYTES # BLD AUTO: 0.5 10E3/UL (ref 0–1.3)
MONOCYTES NFR BLD AUTO: 10 %
NEUTROPHILS # BLD AUTO: 2.4 10E3/UL (ref 1.6–8.3)
NEUTROPHILS NFR BLD AUTO: 50 %
NRBC # BLD AUTO: 0 10E3/UL
NRBC BLD AUTO-RTO: 0 /100
PLATELET # BLD AUTO: 267 10E3/UL (ref 150–450)
POTASSIUM SERPL-SCNC: 4.2 MMOL/L (ref 3.4–5.3)
PROT SERPL-MCNC: 6.8 G/DL (ref 6.4–8.3)
RBC # BLD AUTO: 4.14 10E6/UL (ref 3.8–5.2)
SODIUM SERPL-SCNC: 140 MMOL/L (ref 135–145)
WBC # BLD AUTO: 4.7 10E3/UL (ref 4–11)

## 2024-08-12 PROCEDURE — 80053 COMPREHEN METABOLIC PANEL: CPT | Performed by: INTERNAL MEDICINE

## 2024-08-12 PROCEDURE — 85025 COMPLETE CBC W/AUTO DIFF WBC: CPT | Performed by: INTERNAL MEDICINE

## 2024-08-12 PROCEDURE — 77049 MRI BREAST C-+ W/CAD BI: CPT

## 2024-08-12 PROCEDURE — 255N000002 HC RX 255 OP 636: Performed by: INTERNAL MEDICINE

## 2024-08-12 PROCEDURE — A9585 GADOBUTROL INJECTION: HCPCS | Performed by: INTERNAL MEDICINE

## 2024-08-12 PROCEDURE — 36415 COLL VENOUS BLD VENIPUNCTURE: CPT

## 2024-08-12 RX ORDER — GADOBUTROL 604.72 MG/ML
6.5 INJECTION INTRAVENOUS ONCE
Status: COMPLETED | OUTPATIENT
Start: 2024-08-12 | End: 2024-08-12

## 2024-08-12 RX ADMIN — GADOBUTROL 6.5 ML: 604.72 INJECTION INTRAVENOUS at 08:55

## 2024-08-12 NOTE — PROGRESS NOTES
Nursing Note:  Brii Taylor presents today for PIV and labs.    Patient seen by provider today: No   present during visit today: Not Applicable.    Note: N/A.    Intravenous Access:  Lab draw site right AC, Needle type IV, Gauge 20.  Labs drawn without difficulty.  Peripheral IV placed.    Discharge Plan:   Patient was sent to radiology for MRI appointment.    Mary Kline RN

## 2024-08-13 ENCOUNTER — HOSPITAL ENCOUNTER (OUTPATIENT)
Dept: ULTRASOUND IMAGING | Facility: CLINIC | Age: 59
Discharge: HOME | End: 2024-08-13
Attending: PHYSICIAN ASSISTANT | Admitting: PHYSICIAN ASSISTANT
Payer: COMMERCIAL

## 2024-08-13 DIAGNOSIS — N93.9 VAGINAL BLEEDING: ICD-10-CM

## 2024-08-13 PROCEDURE — 76830 TRANSVAGINAL US NON-OB: CPT

## 2024-08-13 PROCEDURE — 76856 US EXAM PELVIC COMPLETE: CPT

## 2024-08-19 ENCOUNTER — ONCOLOGY VISIT (OUTPATIENT)
Dept: ONCOLOGY | Facility: CLINIC | Age: 59
End: 2024-08-19
Attending: INTERNAL MEDICINE
Payer: COMMERCIAL

## 2024-08-19 VITALS
SYSTOLIC BLOOD PRESSURE: 116 MMHG | BODY MASS INDEX: 24.27 KG/M2 | HEIGHT: 66 IN | HEART RATE: 69 BPM | DIASTOLIC BLOOD PRESSURE: 77 MMHG | WEIGHT: 151 LBS | TEMPERATURE: 97 F | OXYGEN SATURATION: 16 %

## 2024-08-19 DIAGNOSIS — Z80.3 FHX: BREAST CANCER IN FIRST DEGREE RELATIVE: ICD-10-CM

## 2024-08-19 DIAGNOSIS — D05.02 NEOPLASM OF LEFT BREAST, PRIMARY TUMOR STAGING CATEGORY TIS: LOBULAR CARCINOMA IN SITU (LCIS): Primary | ICD-10-CM

## 2024-08-19 PROCEDURE — 99214 OFFICE O/P EST MOD 30 MIN: CPT | Performed by: INTERNAL MEDICINE

## 2024-08-19 PROCEDURE — 99213 OFFICE O/P EST LOW 20 MIN: CPT | Performed by: INTERNAL MEDICINE

## 2024-08-19 RX ORDER — TAMOXIFEN CITRATE 20 MG/1
20 TABLET ORAL DAILY
Qty: 90 TABLET | Refills: 3 | Status: SHIPPED | OUTPATIENT
Start: 2024-08-19

## 2024-08-19 ASSESSMENT — ENCOUNTER SYMPTOMS
CARDIOVASCULAR NEGATIVE: 1
HEMATOLOGIC/LYMPHATIC NEGATIVE: 1
ENDOCRINE NEGATIVE: 1
PSYCHIATRIC NEGATIVE: 1
CONSTITUTIONAL NEGATIVE: 1
GASTROINTESTINAL NEGATIVE: 1
NEUROLOGICAL NEGATIVE: 1
ARTHRALGIAS: 1
EYES NEGATIVE: 1
RESPIRATORY NEGATIVE: 1

## 2024-08-19 ASSESSMENT — PAIN SCALES - GENERAL: PAINLEVEL: NO PAIN (0)

## 2024-08-19 NOTE — LETTER
"8/19/2024      Brii Taylor  [address] [place] [address]      Dear Colleague,    Thank you for referring your patient, Brii Taylor, to the [facility]. Please see a copy of my visit note below.    Assessment & Plan   Elevated lifetime cancer risk    Per guidelines, she should remain on tamoxifen for 5 years as primary prophylaxis  Next mammogram / provider visit 6 months    Interval History  This is a scheduled one year follow up for this  previously seen by Aubrie Barnes MD for left breast cancer.  Per last clinic note, \"Brii is a 57-year-old postmenopausal patient.  She has got a diagnosis of LCIS of the left breast and also atypical ductal hyperplasia.  In addition to that, she has got a strong family history of breast cancer with a sister who had breast cancer at 38 and then again at 50...Genetic testing was done, which was normal.  Because of Brii's lifetime risk of breast cancer, being greater than 25%, she sees me every 6 months and we do mammograms and MRI scan and she comes back today for interim followup.\"    She interrupted her tamoxifen recently while having an ultrasound to evaluate vaginal bleeding.  Endometrial lining was unremarkable, per ultrasonographer, so she's resumed tamoxifen.    Her sister is battling breast cancer    ECOG = 0    Patient Active Problem List   Diagnosis     Anxiety     CARDIOVASCULAR SCREENING; LDL GOAL LESS THAN 160     Onychomycosis     Finger pain, right     Plantar warts     Pulmonary nodules     Mitral valve prolapse     Atypical lobular hyperplasia (ALH) of left breast     Obstructive sleep apnea syndrome     Breast neoplasm, Tis (LCIS)     Snoring     Irritable bowel syndrome     Articular disc disorder of temporomandibular joint     Current Outpatient Medications   Medication Sig Dispense Refill     acetaminophen-caffeine (EXCEDRIN TENSION HEADACHE) 500-65 MG TABS Take 2 tablets by mouth every " 6 hours as needed for mild pain       clonazePAM (KLONOPIN) 0.5 MG tablet TK 1 T PO D PRA  1     sertraline (ZOLOFT) 100 MG tablet Take 1 tablet (100 mg) by mouth daily 90 tablet 0     tamoxifen (NOLVADEX) 20 MG tablet Take 1 tablet (20 mg) by mouth daily 90 tablet 3     No current facility-administered medications for this visit.     Past Medical History:   Diagnosis Date     Anxiety     sees psychiatrist     Arthritis      Heart murmur      Migraine, unspecified, without mention of intractable migraine without mention of status migrainosus      Sleep apnea      Sleep disturbance, unspecified     hx sleep apnea     Viral warts, unspecified      Past Surgical History:   Procedure Laterality Date     BIOPSY  ? 2010    Needle biopsy- breast- benign     BIOPSY BREAST SEED LOCALIZATION Left 01/24/2020    Procedure: LEFT BREAST SEED LOCALIZED EXCISIONAL BIOPSY;  Surgeon: Cristi Arndt MD;  Location:  OR     BREAST SURGERY  2020?    Lumpectomy     COLONOSCOPY Left 12/18/2017    Procedure: COLONOSCOPY;  Colonoscopy; difficulty in advancing scope (tight corner) so used biopsy forcep to follow/ advance scope;  Surgeon: Babar Gramajo MD;  Location:  GI     TONSILLECTOMY       Socioeconomic History     Marital status:      Social Determinants of Health     Social Connections: Moderately Integrated (6/22/2023)    Social Connection and Isolation Panel [NHANES]      Frequency of Communication with Friends and Family: Three times a week      Frequency of Social Gatherings with Friends and Family: Twice a week      Attends Gnosticist Services: More than 4 times per year      Active Member of Clubs or Organizations: No      Marital Status:    Interpersonal Safety: Not At Risk (8/16/2023)    Humiliation, Afraid, Rape, and Kick questionnaire      Fear of Current or Ex-Partner: No      Emotionally Abused: No      Physically Abused: No      Sexually Abused: No     Review of Systems   Constitutional: Negative.   "  HENT:  Negative.     Eyes: Negative.    Respiratory: Negative.          Uses CPAP   Cardiovascular: Negative.    Gastrointestinal: Negative.    Endocrine: Negative.    Genitourinary:  Positive for menstrual problem (had some spotting that interrupted her tamoxifen, now has resumed).    Musculoskeletal:  Positive for arthralgias (had JANE left in June, still recovering).   Skin: Negative.    Neurological: Negative.    Hematological: Negative.    Psychiatric/Behavioral: Negative.     All other systems reviewed and are negative.      /77   Pulse 69   Temp 97  F (36.1  C) (Temporal)   Ht 1.676 m (5' 6\")   Wt 68.5 kg (151 lb)   LMP 09/20/2019   SpO2 (!) 16%   BMI 24.37 kg/m      Physical Exam  Vitals and nursing note reviewed.   Constitutional:       Appearance: Normal appearance. She is well-developed, well-groomed and normal weight.      Comments: Cheerful, well-appearing blonde woman   HENT:      Head: Normocephalic and atraumatic.      Mouth/Throat:      Mouth: Mucous membranes are moist.      Dentition: Normal dentition. No dental caries.   Eyes:      Extraocular Movements: Extraocular movements intact.      Conjunctiva/sclera: Conjunctivae normal.      Pupils: Pupils are equal, round, and reactive to light.   Cardiovascular:      Rate and Rhythm: Normal rate and regular rhythm.      Pulses: Normal pulses.      Heart sounds: Normal heart sounds.   Pulmonary:      Effort: Pulmonary effort is normal.      Breath sounds: Normal breath sounds.   Chest:   Breasts:     Right: Normal.      Left: Normal.      Comments: No palpable or visible findings for cancer  Abdominal:      General: There is no distension.      Palpations: Abdomen is soft. There is no mass.      Tenderness: There is no abdominal tenderness. There is no guarding.   Musculoskeletal:         General: No swelling or deformity.      Cervical back: Normal range of motion.   Lymphadenopathy:      Cervical: No cervical adenopathy.      Upper Body: "      Right upper body: No axillary or epitrochlear adenopathy.      Left upper body: No axillary or epitrochlear adenopathy.   Skin:     General: Skin is warm.   Neurological:      Mental Status: She is alert and oriented to person, place, and time.      Cranial Nerves: No cranial nerve deficit.      Motor: No weakness.      Gait: Gait normal.      Deep Tendon Reflexes: Reflexes normal.   Psychiatric:         Mood and Affect: Mood normal.         Behavior: Behavior normal. Behavior is cooperative.         Thought Content: Thought content normal.         Judgment: Judgment normal.       Recent Results (from the past 720 hour(s))   Comprehensive metabolic panel    Collection Time: 08/12/24  8:21 AM   Result Value Ref Range    Sodium 140 135 - 145 mmol/L    Potassium 4.2 3.4 - 5.3 mmol/L    Carbon Dioxide (CO2) 25 22 - 29 mmol/L    Anion Gap 11 7 - 15 mmol/L    Urea Nitrogen 12.6 6.0 - 20.0 mg/dL    Creatinine 0.63 0.51 - 0.95 mg/dL    GFR Estimate >90 >60 mL/min/1.73m2    Calcium 9.4 8.8 - 10.4 mg/dL    Chloride 104 98 - 107 mmol/L    Glucose 101 (H) 70 - 99 mg/dL    Alkaline Phosphatase 83 40 - 150 U/L    AST 17 0 - 45 U/L    ALT 12 0 - 50 U/L    Protein Total 6.8 6.4 - 8.3 g/dL    Albumin 4.2 3.5 - 5.2 g/dL    Bilirubin Total 0.3 <=1.2 mg/dL   CBC with platelets and differential    Collection Time: 08/12/24  8:21 AM   Result Value Ref Range    WBC Count 4.7 4.0 - 11.0 10e3/uL    RBC Count 4.14 3.80 - 5.20 10e6/uL    Hemoglobin 11.8 11.7 - 15.7 g/dL    Hematocrit 36.5 35.0 - 47.0 %    MCV 88 78 - 100 fL    MCH 28.5 26.5 - 33.0 pg    MCHC 32.3 31.5 - 36.5 g/dL    RDW 13.3 10.0 - 15.0 %    Platelet Count 267 150 - 450 10e3/uL    % Neutrophils 50 %    % Lymphocytes 35 %    % Monocytes 10 %    % Eosinophils 4 %    % Basophils 1 %    % Immature Granulocytes 0 %    NRBCs per 100 WBC 0 <1 /100    Absolute Neutrophils 2.4 1.6 - 8.3 10e3/uL    Absolute Lymphocytes 1.6 0.8 - 5.3 10e3/uL    Absolute Monocytes 0.5 0.0 - 1.3  10e3/uL    Absolute Eosinophils 0.2 0.0 - 0.7 10e3/uL    Absolute Basophils 0.1 0.0 - 0.2 10e3/uL    Absolute Immature Granulocytes 0.0 <=0.4 10e3/uL    Absolute NRBCs 0.0 10e3/uL       Recent Results (from the past 744 hour(s))   XR Pelvis and Hip Left 2 Views    Impression    COMPARISON:  07/11/2024.    FINDINGS: Left hip arthroplasty remains in place. No evidence of interval complication. No fracture or dislocation. Remainder of exam stable.   MR Breast Bilateral w/o & w Contrast    Narrative    EXAM: Bilateral breast MRI with and without contrast, and computer  aided kinetic analysis.     HISTORY/INDICATION: High risk screening. Personal history of left  breast ADH and LCIS  status post surgical excision in 2020    COMPARISON: 8/11/2023, 7/11/2022, 7/7/2021, 6/25/2020    TECHNIQUE: Multiplanar, multisequence imaging performed prior to  contrast administration and at multiple time points after contrast  administration. Post-processing including subtractions, MIPs and color  encoding of post contrast dynamic acquisitions.   IV contrast: 6.5 mL Gadavist  SEDATION: None    FINDINGS:  Breast composition: Heterogeneous fibroglandular tissue  Background parenchymal enhancement: Minimal      Right Breast: No concerning areas of enhancement.   Right Axilla: No lymphadenopathy.   Right Internal Mammary Chain: No lymphadenopathy.     Left Breast: No concerning areas of enhancement.  Stable post surgical  change.  Left Axilla: No lymphadenopathy.   Left Internal Mammary Chain: No lymphadenopathy.         Impression    IMPRESSION: Nothing for malignancy in either breast. Stable  postsurgical change in the left breast.    BIRADS:   BI-RADS CATEGORY: 2 - Benign Finding(s). Recommend continued annual  high risk breast cancer screening      RECOMMENDATION:    ENRIQUE SMITH MD         SYSTEM ID:  [id number]   US Pelvic Complete with Transvaginal    Narrative    US PELVIC TRANSABDOMINAL AND TRANSVAGINAL 8/13/2024 10:55  AM    CLINICAL HISTORY: Vaginal bleeding while on tamoxifen.    TECHNIQUE: Transabdominal scans were performed. Endovaginal ultrasound  was performed to better visualize the adnexa.    COMPARISON: None.    FINDINGS:    UTERUS: 7.5 x 4.3 x 4.1 cm. Normal in size and position with no  masses.    ENDOMETRIUM: 7 mm. Trace fluid at the endometrium. No focal  endometrial abnormality.    RIGHT OVARY: Obscured by bowel from visualization.     LEFT OVARY: 2.4 x 1.3 x 0.6 cm. No focal lesion.    Trace pelvic fluid.      Impression    IMPRESSION:  1.  Endometrium measures up to 7 mm, borderline thickened. This could  be observed with tamoxifen therapy, but if bleeding continues, further  evaluation of the endometrium is suggested.  2.  Right ovary is not visualized for assessment. Normal left ovary.  3.  Trace pelvic fluid.      TERRELL ROGER MD         SYSTEM ID:  [id number]         Again, thank you for allowing me to participate in the care of your patient.        Sincerely,        Sandra Sanderson MD

## 2024-08-19 NOTE — NURSING NOTE
"Oncology Rooming Note    August 19, 2024 9:07 AM   Brii Taylor is a 58 year old female who presents for:    Chief Complaint   Patient presents with    Oncology Clinic Visit     Initial Vitals: /77   Pulse 69   Temp 97  F (36.1  C) (Temporal)   Ht 1.676 m (5' 6\")   Wt 68.5 kg (151 lb)   LMP 09/20/2019   SpO2 (!) 16%   BMI 24.37 kg/m   Estimated body mass index is 24.37 kg/m  as calculated from the following:    Height as of this encounter: 1.676 m (5' 6\").    Weight as of this encounter: 68.5 kg (151 lb). Body surface area is 1.79 meters squared.  No Pain (0) Comment: Data Unavailable   Patient's last menstrual period was 09/20/2019.  Allergies reviewed: Yes  Medications reviewed: Yes    Medications: MEDICATION REFILLS NEEDED TODAY. Provider was notified. Tamoxifen   Pharmacy name entered into [facility] OTR:    [id number] [place], [place] - [address] [id number] [place], [place] - [address]    Frailty Screening:   Is the patient here for a new oncology consult visit in cancer care? 2. No      Clinical concerns: f/u       Ritika Mckeon CMA              "

## 2024-08-19 NOTE — PROGRESS NOTES
"Assessment & Plan   Elevated lifetime cancer risk    Per guidelines, she should remain on tamoxifen for 5 years as primary prophylaxis  Next mammogram / provider visit 6 months    Interval History  This is a scheduled one year follow up for this  previously seen by Aubrie Barnes MD for left breast cancer.  Per last clinic note, \"Brii is a 57-year-old postmenopausal patient.  She has got a diagnosis of LCIS of the left breast and also atypical ductal hyperplasia.  In addition to that, she has got a strong family history of breast cancer with a sister who had breast cancer at 38 and then again at 50...Genetic testing was done, which was normal.  Because of Brii's lifetime risk of breast cancer, being greater than 25%, she sees me every 6 months and we do mammograms and MRI scan and she comes back today for interim followup.\"    She interrupted her tamoxifen recently while having an ultrasound to evaluate vaginal bleeding.  Endometrial lining was unremarkable, per ultrasonographer, so she's resumed tamoxifen.    Her sister is battling breast cancer    ECOG = 0    Patient Active Problem List   Diagnosis    Anxiety    CARDIOVASCULAR SCREENING; LDL GOAL LESS THAN 160    Onychomycosis    Finger pain, right    Plantar warts    Pulmonary nodules    Mitral valve prolapse    Atypical lobular hyperplasia (ALH) of left breast    Obstructive sleep apnea syndrome    Breast neoplasm, Tis (LCIS)    Snoring    Irritable bowel syndrome    Articular disc disorder of temporomandibular joint     Current Outpatient Medications   Medication Sig Dispense Refill    acetaminophen-caffeine (EXCEDRIN TENSION HEADACHE) 500-65 MG TABS Take 2 tablets by mouth every 6 hours as needed for mild pain      clonazePAM (KLONOPIN) 0.5 MG tablet TK 1 T PO D PRA  1    sertraline (ZOLOFT) 100 MG tablet Take 1 tablet (100 mg) by mouth daily 90 tablet 0    tamoxifen (NOLVADEX) 20 MG tablet Take 1 tablet (20 mg) by mouth daily 90 " tablet 3     No current facility-administered medications for this visit.     Past Medical History:   Diagnosis Date    Anxiety     sees psychiatrist    Arthritis     Heart murmur     Migraine, unspecified, without mention of intractable migraine without mention of status migrainosus     Sleep apnea     Sleep disturbance, unspecified     hx sleep apnea    Viral warts, unspecified      Past Surgical History:   Procedure Laterality Date    BIOPSY  ? 2010    Needle biopsy- breast- benign    BIOPSY BREAST SEED LOCALIZATION Left 01/24/2020    Procedure: LEFT BREAST SEED LOCALIZED EXCISIONAL BIOPSY;  Surgeon: Cristi Arndt MD;  Location: [facility]    BREAST SURGERY  2020?    Lumpectomy    COLONOSCOPY Left 12/18/2017    Procedure: COLONOSCOPY;  Colonoscopy; difficulty in advancing scope (tight corner) so used biopsy forcep to follow/ advance scope;  Surgeon: Babar Gramajo MD;  Location:  [facility]    TONSILLECTOMY       Socioeconomic History    Marital status:      Social Determinants of Health     Social Connections: Moderately Integrated (6/22/2023)    Social Connection and Isolation Panel [NHANES]     Frequency of Communication with Friends and Family: Three times a week     Frequency of Social Gatherings with Friends and Family: Twice a week     Attends Voodoo Services: More than 4 times per year     Active Member of Clubs or Organizations: No     Marital Status:    Interpersonal Safety: Not At Risk (8/16/2023)    Humiliation, Afraid, Rape, and Kick questionnaire     Fear of Current or Ex-Partner: No     Emotionally Abused: No     Physically Abused: No     Sexually Abused: No     Review of Systems   Constitutional: Negative.    HENT:  Negative.     Eyes: Negative.    Respiratory: Negative.          Uses CPAP   Cardiovascular: Negative.    Gastrointestinal: Negative.    Endocrine: Negative.    Genitourinary:  Positive for menstrual problem (had some spotting that interrupted her tamoxifen, now has  "resumed).    Musculoskeletal:  Positive for arthralgias (had JANE left in June, still recovering).   Skin: Negative.    Neurological: Negative.    Hematological: Negative.    Psychiatric/Behavioral: Negative.     All other systems reviewed and are negative.      /77   Pulse 69   Temp 97  F (36.1  C) (Temporal)   Ht 1.676 m (5' 6\")   Wt 68.5 kg (151 lb)   LMP 09/20/2019   SpO2 (!) 16%   BMI 24.37 kg/m      Physical Exam  Vitals and nursing note reviewed.   Constitutional:       Appearance: Normal appearance. She is well-developed, well-groomed and normal weight.      Comments: Cheerful, well-appearing blonde woman   HENT:      Head: Normocephalic and atraumatic.      Mouth/Throat:      Mouth: Mucous membranes are moist.      Dentition: Normal dentition. No dental caries.   Eyes:      Extraocular Movements: Extraocular movements intact.      Conjunctiva/sclera: Conjunctivae normal.      Pupils: Pupils are equal, round, and reactive to light.   Cardiovascular:      Rate and Rhythm: Normal rate and regular rhythm.      Pulses: Normal pulses.      Heart sounds: Normal heart sounds.   Pulmonary:      Effort: Pulmonary effort is normal.      Breath sounds: Normal breath sounds.   Chest:   Breasts:     Right: Normal.      Left: Normal.      Comments: No palpable or visible findings for cancer  Abdominal:      General: There is no distension.      Palpations: Abdomen is soft. There is no mass.      Tenderness: There is no abdominal tenderness. There is no guarding.   Musculoskeletal:         General: No swelling or deformity.      Cervical back: Normal range of motion.   Lymphadenopathy:      Cervical: No cervical adenopathy.      Upper Body:      Right upper body: No axillary or epitrochlear adenopathy.      Left upper body: No axillary or epitrochlear adenopathy.   Skin:     General: Skin is warm.   Neurological:      Mental Status: She is alert and oriented to person, place, and time.      Cranial Nerves: No " cranial nerve deficit.      Motor: No weakness.      Gait: Gait normal.      Deep Tendon Reflexes: Reflexes normal.   Psychiatric:         Mood and Affect: Mood normal.         Behavior: Behavior normal. Behavior is cooperative.         Thought Content: Thought content normal.         Judgment: Judgment normal.       Recent Results (from the past 720 hour(s))   Comprehensive metabolic panel    Collection Time: 08/12/24  8:21 AM   Result Value Ref Range    Sodium 140 135 - 145 mmol/L    Potassium 4.2 3.4 - 5.3 mmol/L    Carbon Dioxide (CO2) 25 22 - 29 mmol/L    Anion Gap 11 7 - 15 mmol/L    Urea Nitrogen 12.6 6.0 - 20.0 mg/dL    Creatinine 0.63 0.51 - 0.95 mg/dL    GFR Estimate >90 >60 mL/min/1.73m2    Calcium 9.4 8.8 - 10.4 mg/dL    Chloride 104 98 - 107 mmol/L    Glucose 101 (H) 70 - 99 mg/dL    Alkaline Phosphatase 83 40 - 150 U/L    AST 17 0 - 45 U/L    ALT 12 0 - 50 U/L    Protein Total 6.8 6.4 - 8.3 g/dL    Albumin 4.2 3.5 - 5.2 g/dL    Bilirubin Total 0.3 <=1.2 mg/dL   CBC with platelets and differential    Collection Time: 08/12/24  8:21 AM   Result Value Ref Range    WBC Count 4.7 4.0 - 11.0 10e3/uL    RBC Count 4.14 3.80 - 5.20 10e6/uL    Hemoglobin 11.8 11.7 - 15.7 g/dL    Hematocrit 36.5 35.0 - 47.0 %    MCV 88 78 - 100 fL    MCH 28.5 26.5 - 33.0 pg    MCHC 32.3 31.5 - 36.5 g/dL    RDW 13.3 10.0 - 15.0 %    Platelet Count 267 150 - 450 10e3/uL    % Neutrophils 50 %    % Lymphocytes 35 %    % Monocytes 10 %    % Eosinophils 4 %    % Basophils 1 %    % Immature Granulocytes 0 %    NRBCs per 100 WBC 0 <1 /100    Absolute Neutrophils 2.4 1.6 - 8.3 10e3/uL    Absolute Lymphocytes 1.6 0.8 - 5.3 10e3/uL    Absolute Monocytes 0.5 0.0 - 1.3 10e3/uL    Absolute Eosinophils 0.2 0.0 - 0.7 10e3/uL    Absolute Basophils 0.1 0.0 - 0.2 10e3/uL    Absolute Immature Granulocytes 0.0 <=0.4 10e3/uL    Absolute NRBCs 0.0 10e3/uL       Recent Results (from the past 744 hour(s))   XR Pelvis and Hip Left 2 Views    Impression     COMPARISON:  07/11/2024.    FINDINGS: Left hip arthroplasty remains in place. No evidence of interval complication. No fracture or dislocation. Remainder of exam stable.   MR Breast Bilateral w/o & w Contrast    Narrative    EXAM: Bilateral breast MRI with and without contrast, and computer  aided kinetic analysis.     HISTORY/INDICATION: High risk screening. Personal history of left  breast ADH and LCIS  status post surgical excision in 2020    COMPARISON: 8/11/2023, 7/11/2022, 7/7/2021, 6/25/2020    TECHNIQUE: Multiplanar, multisequence imaging performed prior to  contrast administration and at multiple time points after contrast  administration. Post-processing including subtractions, MIPs and color  encoding of post contrast dynamic acquisitions.   IV contrast: 6.5 mL Gadavist  SEDATION: None    FINDINGS:  Breast composition: Heterogeneous fibroglandular tissue  Background parenchymal enhancement: Minimal      Right Breast: No concerning areas of enhancement.   Right Axilla: No lymphadenopathy.   Right Internal Mammary Chain: No lymphadenopathy.     Left Breast: No concerning areas of enhancement.  Stable post surgical  change.  Left Axilla: No lymphadenopathy.   Left Internal Mammary Chain: No lymphadenopathy.         Impression    IMPRESSION: Nothing for malignancy in either breast. Stable  postsurgical change in the left breast.    BIRADS:   BI-RADS CATEGORY: 2 - Benign Finding(s). Recommend continued annual  high risk breast cancer screening      RECOMMENDATION:    ENRIQUE SMITH MD         SYSTEM ID:  [id number]   US Pelvic Complete with Transvaginal    Narrative    US PELVIC TRANSABDOMINAL AND TRANSVAGINAL 8/13/2024 10:55 AM    CLINICAL HISTORY: Vaginal bleeding while on tamoxifen.    TECHNIQUE: Transabdominal scans were performed. Endovaginal ultrasound  was performed to better visualize the adnexa.    COMPARISON: None.    FINDINGS:    UTERUS: 7.5 x 4.3 x 4.1 cm. Normal in size and position with  no  masses.    ENDOMETRIUM: 7 mm. Trace fluid at the endometrium. No focal  endometrial abnormality.    RIGHT OVARY: Obscured by bowel from visualization.     LEFT OVARY: 2.4 x 1.3 x 0.6 cm. No focal lesion.    Trace pelvic fluid.      Impression    IMPRESSION:  1.  Endometrium measures up to 7 mm, borderline thickened. This could  be observed with tamoxifen therapy, but if bleeding continues, further  evaluation of the endometrium is suggested.  2.  Right ovary is not visualized for assessment. Normal left ovary.  3.  Trace pelvic fluid.      TERRELL ROGER MD         SYSTEM ID:  [id number]

## 2024-11-29 DIAGNOSIS — F41.9 ANXIETY: ICD-10-CM

## 2024-11-29 NOTE — TELEPHONE ENCOUNTER
Pending Prescriptions:                       Disp   Refills    sertraline (ZOLOFT) 100 MG tablet [Pharmac*90 tab*0        Sig: TAKE 1 TABLET(100 MG) BY MOUTH DAILY    Clinic RN: Please investigate patient's chart or contact patient if the information cannot be found because patient should have run out of this medication on August/September 2024. Confirm patient is taking this medication as prescribed. Document findings and route refill encounter to provider for approval or denial.

## 2024-12-03 RX ORDER — SERTRALINE HYDROCHLORIDE 100 MG/1
100 TABLET, FILM COATED ORAL DAILY
Qty: 90 TABLET | Refills: 0 | Status: SHIPPED | OUTPATIENT
Start: 2024-12-03

## 2025-01-08 ENCOUNTER — HOSPITAL ENCOUNTER (OUTPATIENT)
Dept: MRI IMAGING | Facility: CLINIC | Age: 60
Discharge: HOME | End: 2025-01-08
Attending: NURSE PRACTITIONER
Payer: COMMERCIAL

## 2025-01-08 DIAGNOSIS — M54.16 LUMBAR RADICULOPATHY: ICD-10-CM

## 2025-01-08 DIAGNOSIS — M54.50 LUMBAR PAIN: ICD-10-CM

## 2025-01-08 DIAGNOSIS — M48.061 LUMBAR FORAMINAL STENOSIS: ICD-10-CM

## 2025-01-08 PROCEDURE — 72148 MRI LUMBAR SPINE W/O DYE: CPT

## 2025-01-19 NOTE — PROGRESS NOTES
Chief Complaint   Patient presents with    Fever     Has felt warm the last two days - last ibuprofen at 0700 hours.  Patient found seated in winter coat eating [employer] - rare cough, no tugging of ears (does attend ).  No rashes noted.  No runny nose unutil she cried when we removed the [employer]    Limited ability to verbalize - no others ill at home.       HISTORY OF PRESENT ILLNESS:  Patient is a 32 month old female BIB mom with subjective fever, decreased energy and sleeping more the past 2 days.  No recorded temp. Last given ibuprofen 4 hrs ago. Pulling at both ear day before feeling warmer.    Mom says child not so verbal yet    Denies runny nose, cough, difficulty breathing, wheezing, abdominal pain, vomiting. Voiding and stooling normally. PO intake at baseline. No known sick contacts.       PAST MEDICAL HISTORY, PAST SURGICAL HISTORY, SOCIAL HISTORY, MEDICATIONS, AND ALLERGIES:  Reviewed with patient.     No past medical history on file.  No past surgical history on file.     Current Outpatient Medications   Medication Sig Dispense Refill    amoxicillin (AMOXIL) 400 MG/5ML suspension Take 3.5 mLs by mouth in the morning and 3.5 mLs in the evening. Do all this for 10 days. 70 mL 0    ibuprofen (CHILDRENS MOTRIN,ADVIL) 100 MG/5ML suspension Take 5.5 mLs by mouth every 6 hours as needed for Fever. 120 mL 0    acetaminophen (TYLENOL) 160 MG/5ML suspension Take 5.8 mLs by mouth every 6 hours as needed for Fever. 118 mL 0     No current facility-administered medications for this visit.     ALLERGIES:  No Known Allergies  No family history on file.    Review of systems:    A review of systems was performed and findings relevant to these symptoms are included in the HPI.    PHYSICAL EXAM:  Vitals:    Vitals:    01/19/25 1130   Pulse: 114   Temp: 98.8 °F (37.1 °C)     Constitutional: interactive, non-toxic appearance, well hydrated  HEENT: pupils are equal, round, reactive to light and accommodation,  Pt returned HST device. It was downloaded and forwarded data to the clinical specialist for scoring.       extraocular movements intact, eyelids and conjunctiva nl, left ear canal nl, left TM is erythematous and bulging, right ear canal nl, right TM is clear and without erythema, nares nl, oropharynx nonerythematous, no vesicles, no exudate, moist mucous membranes  Neck: supple, no lymphadenopathy  Lungs: comfortable respirations, clear to to auscultation bilaterally, good air entry bilaterally, no wheezing or crackles, no accessory muscle use  Cardiovascular: dual heart sounds, normal heart rate, regular rhythm, no murmur   Skin: Warm, normal skin turgor      LABS/RADIOLOGY:  Orders Placed This Encounter    amoxicillin (AMOXIL) 400 MG/5ML suspension       ASSESSMENT/PLAN:  Encounter Diagnoses   Name Primary?    Left acute otitis media Yes     Orders Placed This Encounter    amoxicillin (AMOXIL) 400 MG/5ML suspension     Afebrile, vitals stable, lungs clear and sating well. Left otitis media on exam. Child is well hydrated  Rx sent for amoxicillin 45mg/kg/day BID x 10 days  Continue to encourage feeds   Alternate Ibuprofen/Tylenol as needed for fever, pain      Follow up with primary if no improvement of symptoms or earlier with urgent care if symptoms worsen or new symptoms appear.       Patient's parent acknowledged understanding and agreed with plan of care.

## 2025-01-22 NOTE — PROGRESS NOTES
ASSESSMENT & PLAN    Discussed diagnosis and treatment options with the patient today. A shared decision making model was used. The patient's values and choices were respected. The following represents what was discussed and decided upon by the provider and the patient.     Brii was seen today for pain.    Diagnoses and all orders for this visit:    Impingement syndrome of left shoulder  -     Physical Therapy  Referral; Future    Arthritis of left acromioclavicular joint  -     Physical Therapy  Referral; Future    Other orders  -     XR Shoulder Left G/E 3 Views; Future        -Acute tendinitis and impingement of shoulder and XR showing AC joint arthritis. Impingement of left shoulder with aspect of mild underlying OA of the shoulder.  + huffman and pain over AC joint. Does not appear like full thickness tear needing immediate surgical intervention as has full strength and ROM but can continue to monitor if symptoms progress. Discussed surgical and nonsurgical options today including surgery, CSI injections, bracing and PT. In favor of conservative treatment.   PLAN:   --Physical therapy ordered. They should call to schedule in 1-2 business day.  If call missed the number to call in AVS below.   -Medications Naproxen 2 pills of 220mg twice a day for two weeks to help with pain and swelling. Take with food.   -Cortisone injection to be considered in future for outside shoulder subacromial impingement and AC joint.     Tanisha Mi DO  [facility] [place]    -----  Chief Complaint   Patient presents with    Left Shoulder - Pain       SUBJECTIVE  Brii Taylor is a/an 59 year old female who is seen as a self referral for evaluation of left shoulder pain.     The patient is seen by themselves.  The patient is Right handed    Onset: 5 weeks ago. Reports insidious onset without acute precipitating event.  Location of Pain: left lateral shoulder with occasional  "pain radiating done inner arm to elbow  Worsened by: shoulder abduction, sleeping on left side, push-ups or other upper body exercises  Better with: rest / activity avoidance, aleve  Treatments tried: rest/activity avoidance and Aleve, therapeutic massage  Associated symptoms: shooting / achy pain down inner arm  Denies numbness, tingling, locking  Orthopedic/Surgical history: YES - h/o left shoulder \"bursitis\" many years ago which improved with medication  Social History/Occupation:     Patient Active Problem List   Diagnosis    Anxiety    CARDIOVASCULAR SCREENING; LDL GOAL LESS THAN 160    Onychomycosis    Finger pain, right    Plantar warts    Pulmonary nodules    Mitral valve prolapse    Atypical lobular hyperplasia (ALH) of left breast    Obstructive sleep apnea syndrome    Breast neoplasm, Tis (LCIS)    Snoring    Irritable bowel syndrome    Articular disc disorder of temporomandibular joint       Current Outpatient Medications   Medication Sig Dispense Refill    acetaminophen-caffeine (EXCEDRIN TENSION HEADACHE) 500-65 MG TABS Take 2 tablets by mouth every 6 hours as needed for mild pain      clonazePAM (KLONOPIN) 0.5 MG tablet TK 1 T PO D PRA  1    sertraline (ZOLOFT) 100 MG tablet TAKE 1 TABLET(100 MG) BY MOUTH DAILY 90 tablet 0    tamoxifen (NOLVADEX) 20 MG tablet Take 1 tablet (20 mg) by mouth daily 90 tablet 3       PMH, Medications and Allergies were reviewed and updated as needed.    REVIEW OF SYSTEMS:  10 point ROS is negative other than symptoms noted above in HPI        OBJECTIVE:  LMP 09/20/2019    General: healthy, alert and in no distress  Skin: no suspicious lesions or rash.  CV: distal perfusion intact LUE  Resp: normal respiratory effort without conversational dyspnea   Psych: normal mood and affect  Gait: NORMAL  Neuro: Normal light sensory exam of ANAID extremity       LEFT SHOULDER  Inspection:    no swelling, bruising, discoloration, or obvious deformity or " asymmetry  Palpation:    Tender about the AC joint ind supraspinatus insertoion. Remainder of bony and tendinous landmarks are nontender.    Crepitus is Absent  Active Range of Motion:     Abduction 1800 / FF 1800 /  / IR SI  Opposite arm abduction/flexion/ER full, IR SI joint  Strength:    Scapular plane abduction 5/5 / ER 5/5 / IR 5/5 / biceps 5/5 / Special Tests:    Positive: Manley' and crossed arm adduction    Negative: supraspinatus (empty can), drop arm/painful arc, and Speed's     RADIOLOGY:  Final results and radiologist's interpretation, available in the [facility] health record.  Images were reviewed with the patient in the office today.    My personal interpretation of the performed imaging:     X-ray shoulder left3 views: 1/29/2024  left glenohumeral without acute fracture or dislocation. Mild AC degenerative changes.            Greater than 45 minutes spent by me on the date of the encounter doing chart review, review of outside records, review of test results, interpretation of tests, patient visit, and documentation . If procedure performed, this was separate from time with procedure.            Disclaimer: This note consists of symbols derived from keyboarding, dictation and/or voice recognition software. As a result, there may be errors in the script that have gone undetected. Please consider this when interpreting information found in this chart.

## 2025-01-29 ENCOUNTER — OFFICE VISIT (OUTPATIENT)
Dept: ORTHOPEDICS | Facility: CLINIC | Age: 60
End: 2025-01-29
Payer: COMMERCIAL

## 2025-01-29 ENCOUNTER — ANCILLARY PROCEDURE (OUTPATIENT)
Dept: GENERAL RADIOLOGY | Facility: CLINIC | Age: 60
End: 2025-01-29
Attending: STUDENT IN AN ORGANIZED HEALTH CARE EDUCATION/TRAINING PROGRAM
Payer: COMMERCIAL

## 2025-01-29 DIAGNOSIS — M19.012 ARTHRITIS OF LEFT ACROMIOCLAVICULAR JOINT: ICD-10-CM

## 2025-01-29 DIAGNOSIS — M75.42 IMPINGEMENT SYNDROME OF LEFT SHOULDER: Primary | ICD-10-CM

## 2025-01-29 DIAGNOSIS — M25.512 ACUTE PAIN OF LEFT SHOULDER: ICD-10-CM

## 2025-01-29 PROCEDURE — 73030 X-RAY EXAM OF SHOULDER: CPT | Mod: TC | Performed by: RADIOLOGY

## 2025-01-29 PROCEDURE — 99204 OFFICE O/P NEW MOD 45 MIN: CPT | Performed by: STUDENT IN AN ORGANIZED HEALTH CARE EDUCATION/TRAINING PROGRAM

## 2025-01-29 NOTE — LETTER
1/29/2025      Brii Taylor  [address] [facility] [id number]      Dear Colleague,    Thank you for referring your patient, Brii Taylor, to the [facility]. Please see a copy of my visit note below.    ASSESSMENT & PLAN    Discussed diagnosis and treatment options with the patient today. A shared decision making model was used. The patient's values and choices were respected. The following represents what was discussed and decided upon by the provider and the patient.     Brii was seen today for pain.    Diagnoses and all orders for this visit:    Impingement syndrome of left shoulder  -     Physical Therapy  Referral; Future    Arthritis of left acromioclavicular joint  -     Physical Therapy  Referral; Future    Other orders  -     XR Shoulder Left G/E 3 Views; Future        -Acute tendinitis and impingement of shoulder and XR showing AC joint arthritis. Impingement of left shoulder with aspect of mild underlying OA of the shoulder.  + huffman and pain over AC joint. Does not appear like full thickness tear needing immediate surgical intervention as has full strength and ROM but can continue to monitor if symptoms progress. Discussed surgical and nonsurgical options today including surgery, CSI injections, bracing and PT. In favor of conservative treatment.   PLAN:   --Physical therapy ordered. They should call to schedule in 1-2 business day.  If call missed the number to call in AVS below.   -Medications Naproxen 2 pills of 220mg twice a day for two weeks to help with pain and swelling. Take with food.   -Cortisone injection to be considered in future for outside shoulder subacromial impingement and AC joint.     Tanisha Mi DO  [facility]    -----  Chief Complaint   Patient presents with     Left Shoulder - Pain       SUBJECTIVE  Brii Taylor is a/an 59 year old female who is seen as a  "self referral for evaluation of left shoulder pain.     The patient is seen by themselves.  The patient is Right handed    Onset: 5 weeks ago. Reports insidious onset without acute precipitating event.  Location of Pain: left lateral shoulder with occasional pain radiating done inner arm to elbow  Worsened by: shoulder abduction, sleeping on left side, push-ups or other upper body exercises  Better with: rest / activity avoidance, aleve  Treatments tried: rest/activity avoidance and Aleve, therapeutic massage  Associated symptoms: shooting / achy pain down inner arm  Denies numbness, tingling, locking  Orthopedic/Surgical history: YES - h/o left shoulder \"bursitis\" many years ago which improved with medication  Social History/Occupation:     Patient Active Problem List   Diagnosis     Anxiety     CARDIOVASCULAR SCREENING; LDL GOAL LESS THAN 160     Onychomycosis     Finger pain, right     Plantar warts     Pulmonary nodules     Mitral valve prolapse     Atypical lobular hyperplasia (ALH) of left breast     Obstructive sleep apnea syndrome     Breast neoplasm, Tis (LCIS)     Snoring     Irritable bowel syndrome     Articular disc disorder of temporomandibular joint       Current Outpatient Medications   Medication Sig Dispense Refill     acetaminophen-caffeine (EXCEDRIN TENSION HEADACHE) 500-65 MG TABS Take 2 tablets by mouth every 6 hours as needed for mild pain       clonazePAM (KLONOPIN) 0.5 MG tablet TK 1 T PO D PRA  1     sertraline (ZOLOFT) 100 MG tablet TAKE 1 TABLET(100 MG) BY MOUTH DAILY 90 tablet 0     tamoxifen (NOLVADEX) 20 MG tablet Take 1 tablet (20 mg) by mouth daily 90 tablet 3       PMH, Medications and Allergies were reviewed and updated as needed.    REVIEW OF SYSTEMS:  10 point ROS is negative other than symptoms noted above in HPI        OBJECTIVE:  [facility] 09/20/2019    General: healthy, alert and in no distress  Skin: no suspicious lesions or rash.  CV: distal perfusion intact " LUE  Resp: normal respiratory effort without conversational dyspnea   Psych: normal mood and affect  Gait: NORMAL  Neuro: Normal light sensory exam of ANAID extremity       LEFT SHOULDER  Inspection:    no swelling, bruising, discoloration, or obvious deformity or asymmetry  Palpation:    Tender about the AC joint ind supraspinatus insertoion. Remainder of bony and tendinous landmarks are nontender.    Crepitus is Absent  Active Range of Motion:     Abduction 1800 / FF 1800 /  / IR SI  Opposite arm abduction/flexion/ER full, IR SI joint  Strength:    Scapular plane abduction 5/5 / ER 5/5 / IR 5/5 / biceps 5/5 / Special Tests:    Positive: Manley' and crossed arm adduction    Negative: supraspinatus (empty can), drop arm/painful arc, and Speed's     RADIOLOGY:  Final results and radiologist's interpretation, available in the [facility] health record.  Images were reviewed with the patient in the office today.    My personal interpretation of the performed imaging:     X-ray shoulder left3 views: 1/29/2024  left glenohumeral without acute fracture or dislocation. Mild AC degenerative changes.            Greater than 45 minutes spent by me on the date of the encounter doing chart review, review of outside records, review of test results, interpretation of tests, patient visit, and documentation . If procedure performed, this was separate from time with procedure.            Disclaimer: This note consists of symbols derived from keyboarding, dictation and/or voice recognition software. As a result, there may be errors in the script that have gone undetected. Please consider this when interpreting information found in this chart.       Again, thank you for allowing me to participate in the care of your patient.        Sincerely,        Tanisha Mi, DO    Electronically signed

## 2025-01-29 NOTE — PATIENT INSTRUCTIONS
1. Impingement syndrome of left shoulder    2. Arthritis of left acromioclavicular joint      --Physical therapy ordered. They should call to schedule in 1-2 business day.  If call missed the number to call in AVS below.   -Medications Naproxen 2 pills of 220mg twice a day for two weeks to help with pain and swelling. Take with food.   -Cortisone injection to be considered in future for outside shoulder subacromial impingement and AC joint.     Please call [phone]  Ask for my team if you have any questions or concerns    Tanisha Mi DO  [facility]      Thank you for choosing [facility]!    CLINIC LOCATIONS:     [place]  TRIAGE LINE: [phone] [address] [facility] APPOINTMENTS: [phone]   [place] [address] RADIOLOGY: [phone]   (Monday, Thursday & Friday) PHYSICAL THERAPY: [phone]    BILLING QUESTIONS: [phone]   [place] FAX: [phone] [address]    [place] [address]    (Wednesday)

## 2025-02-17 ENCOUNTER — HOSPITAL ENCOUNTER (OUTPATIENT)
Dept: MAMMOGRAPHY | Facility: CLINIC | Age: 60
Discharge: HOME | End: 2025-02-17
Attending: INTERNAL MEDICINE | Admitting: INTERNAL MEDICINE
Payer: COMMERCIAL

## 2025-02-17 DIAGNOSIS — D05.02 NEOPLASM OF LEFT BREAST, PRIMARY TUMOR STAGING CATEGORY TIS: LOBULAR CARCINOMA IN SITU (LCIS): ICD-10-CM

## 2025-02-17 PROCEDURE — 77067 SCR MAMMO BI INCL CAD: CPT

## 2025-02-18 NOTE — PROGRESS NOTES
Oncology/Hematology Visit Note  Feb 19, 2025    Reason for Visit: Follow up of LCIS, family history of breast cancer    History of Present Illness: Brii Taylor is a 59 year old female with history of LCIS, atypical ductal hyperplasia, and strong family history of breast cancer. She had screening mammogram that was abnormal and biopsy revealed atypical lobular hyperplasia and flat epithelial atypia. She underwent excision Jan 2020 which revealed LCIS, atypical ductal hyperplasia, and flat epithelial atypia, no evidence of malignancy. She met with Dr. Barnes and given her diagnosis and family history of breast cancer (sister with breast cancer at 38 and then recurrence at age 50 though sister genetic testing negative), she was recommended high risk screening (mammogram and MRI) along with chemoprevention with Tamoxifen March 2020. Her lifetime risk of breast cancer is greater than 20%.     Due to side effects with tamoxifen she was switched to Arimidex in Jan 2021 but could not tolerate it so when back to Tamoxifen. She did have to interrupt her tamoxifen for vaginal bleeding and underwent US that showed borderline thickened endometrium but she did resume tamoxifen as long as no bleeding recurred.     She returns today for oncology follow-up.    Interval History:  Brii is here unaccompanied. She overall is doing well. Has had more hot flashes recently, occur at different points during the day. Also has struggled to lose weight while on tamoxifen. No recurrent vaginal bleeding. No breast concerns.     Review of Systems:  Patient denies fevers, chills, night sweats, unexplained weight changes, headaches, dizziness, vision or hearing changes, new lumps or bumps, chest pain, shortness of breath, cough, abdominal pain, nausea, vomiting, changes to bowel or bladder, swelling of extremities, bleeding issues, or rash.    Current Outpatient Medications   Medication Sig Dispense Refill    acetaminophen-caffeine  (EXCEDRIN TENSION HEADACHE) 500-65 MG TABS Take 2 tablets by mouth every 6 hours as needed for mild pain      clonazePAM (KLONOPIN) 0.5 MG tablet TK 1 T PO D PRA  1    sertraline (ZOLOFT) 100 MG tablet TAKE 1 TABLET(100 MG) BY MOUTH DAILY 90 tablet 0    tamoxifen (NOLVADEX) 20 MG tablet Take 1 tablet (20 mg) by mouth daily 90 tablet 3       Past Medical History  Past Medical History:   Diagnosis Date    Anxiety     sees psychiatrist    Arthritis     Heart murmur     Migraine, unspecified, without mention of intractable migraine without mention of status migrainosus     Sleep apnea     Sleep disturbance, unspecified     hx sleep apnea    Viral warts, unspecified      Past Surgical History:   Procedure Laterality Date    BIOPSY  ? 2010    Needle biopsy- breast- benign    BIOPSY BREAST SEED LOCALIZATION Left 01/24/2020    Procedure: LEFT BREAST SEED LOCALIZED EXCISIONAL BIOPSY;  Surgeon: Cristi Arndt MD;  Location:  OR    BREAST SURGERY  2020?    Lumpectomy    COLONOSCOPY Left 12/18/2017    Procedure: COLONOSCOPY;  Colonoscopy; difficulty in advancing scope (tight corner) so used biopsy forcep to follow/ advance scope;  Surgeon: Babar Gramajo MD;  Location:  GI    IR LUMBAR PUNCTURE  11/22/2024    TONSILLECTOMY       Allergies   Allergen Reactions    Penicillins      PN: LW Reaction: rash     Social History   Social History     Tobacco Use    Smoking status: Never    Smokeless tobacco: Never   Vaping Use    Vaping status: Never Used   Substance Use Topics    Alcohol use: No    Drug use: No      Past medical history and social history were reviewed.    Physical Examination:  /68   Pulse 68   Temp 97.7  F (36.5  C) (Tympanic)   Resp 18   Wt 71 kg (156 lb 9.6 oz)   LMP 09/20/2019   SpO2 96%   BMI 25.28 kg/m    Wt Readings from Last 10 Encounters:   08/19/24 68.5 kg (151 lb)   05/15/24 66.2 kg (146 lb)   08/21/23 68.6 kg (151 lb 4.8 oz)   08/16/23 68 kg (149 lb 14.4 oz)   06/23/23 68.5 kg (151 lb)    03/17/23 65.8 kg (145 lb)   02/21/23 68.3 kg (150 lb 8 oz)   11/09/22 67.1 kg (148 lb)   09/16/22 68 kg (150 lb)   08/26/22 68 kg (150 lb)     Constitutional: Well-appearing female in no acute distress.  Eyes: EOMI, PERRL. No scleral icterus.  ENT: Oral mucosa is moist without lesions or thrush.   Lymphatic: Neck is supple without cervical or supraclavicular lymphadenopathy. No axillary lymphadenopathy.  Breast: No palpable abnormalities in either breast. Nipples everted without drainage. No erythema or pitting.  Well healed left lumpectomy incision.  Cardiovascular: Regular rate and rhythm. No murmurs, gallops, or rubs. No peripheral edema.  Respiratory: Clear to auscultation bilaterally. No wheezes or crackles.  Gastrointestinal: Bowel sounds present. Abdomen soft, non-tender. No palpable hepatosplenomegaly or masses.   Neurologic: Cranial nerves II through XII are grossly intact.  Skin: No rashes, petechiae, or bruising noted on exposed skin.    Laboratory Data:   Latest Reference Range & Units 02/19/25 12:54   Sodium 135 - 145 mmol/L 141   Potassium 3.4 - 5.3 mmol/L 3.7   Chloride 98 - 107 mmol/L 104   Carbon Dioxide (CO2) 22 - 29 mmol/L 29   Urea Nitrogen 6.0 - 20.0 mg/dL 17.4   Creatinine 0.51 - 0.95 mg/dL 0.70   GFR Estimate >60 mL/min/1.73m2 >90   Calcium 8.8 - 10.4 mg/dL 9.3   Anion Gap 7 - 15 mmol/L 8   Albumin 3.5 - 5.2 g/dL 3.9   Protein Total 6.4 - 8.3 g/dL 6.5   Alkaline Phosphatase 40 - 150 U/L 64   ALT 0 - 50 U/L 15   AST 0 - 45 U/L 24   Bilirubin Total <=1.2 mg/dL 0.3   Glucose 70 - 99 mg/dL 85   WBC 4.0 - 11.0 10e3/uL 7.8   Hemoglobin 11.7 - 15.7 g/dL 12.6   Hematocrit 35.0 - 47.0 % 37.6   Platelet Count 150 - 450 10e3/uL 245   RBC Count 3.80 - 5.20 10e6/uL 4.33   MCV 78 - 100 fL 87   MCH 26.5 - 33.0 pg 29.1   MCHC 31.5 - 36.5 g/dL 33.5   RDW 10.0 - 15.0 % 13.5   % Neutrophils % 66   % Lymphocytes % 25   % Monocytes % 6   % Eosinophils % 2   % Basophils % 1   Absolute Basophils 0.0 - 0.2 10e3/uL  0.1   Absolute Eosinophils 0.0 - 0.7 10e3/uL 0.2   Absolute Immature Granulocytes <=0.4 10e3/uL 0.0   Absolute Lymphocytes 0.8 - 5.3 10e3/uL 1.9   Absolute Monocytes 0.0 - 1.3 10e3/uL 0.5   % Immature Granulocytes % 0   Absolute Neutrophils 1.6 - 8.3 10e3/uL 5.1   Absolute NRBCs 10e3/uL 0.0   NRBCs per 100 WBC <1 /100 0     Mammogram 2/17/25  may obscure small  masses.  There are post-surgical changes in the left breast.  There is no radiographic evidence of malignancy.   Impression:     IMPRESSION: ACR BI-RADS Category 2: Benign    BREAST CANCER SCREENING RECOMMENDATION: Routine yearly mammography  beginning at age 40 or as discussed with your provider.    The results and recommendations of this examination will be communicated  to the patient.       Assessment and Plan:  # High Risk of Breast Cancer  # Hx Left LCIS/ADH  Diagnosed with left LCIS/ADH in Jan 2020 s/p lumpectomy, then recommended chemoprevention with Tamoxifen 20mg daily x 5 years along with high risk screening. She has done well during this time   - Continues on Tamoxifen 20mg daily. She will complete 5 years end of March 2025 and will stop at that time  - No signs or symptoms of breast cancer. Labs reassuring. Mammogram negative   - Continue high risk screening with MRI breast and mammogram every 6 months, next due for breast MRI August 2025, ordered  - Wondering about menopause status. She is most likely in menopause with age and amenorrhea but can check hormone levels this summer to confirm  - She had borderline thickened endometrium in the past, no recurrent vaginal bleeding. Will get follow-up US to ensure stability of endometrium  - After our follow-up this summer she could transition to follow-up with PCP to continue high risk screening. Will review more at next visit     35 minutes spent on the date of the encounter doing chart review, review of test results, interpretation of tests, patient visit, and documentation     Eldon Peñaloza  EDITH  Department of Hematology and Oncology  [facility]

## 2025-02-19 ENCOUNTER — ONCOLOGY VISIT (OUTPATIENT)
Dept: ONCOLOGY | Facility: CLINIC | Age: 60
End: 2025-02-19
Attending: INTERNAL MEDICINE
Payer: COMMERCIAL

## 2025-02-19 VITALS
RESPIRATION RATE: 18 BRPM | TEMPERATURE: 97.7 F | SYSTOLIC BLOOD PRESSURE: 105 MMHG | OXYGEN SATURATION: 96 % | BODY MASS INDEX: 25.28 KG/M2 | WEIGHT: 156.6 LBS | HEART RATE: 68 BPM | DIASTOLIC BLOOD PRESSURE: 68 MMHG

## 2025-02-19 DIAGNOSIS — D05.02 NEOPLASM OF LEFT BREAST, PRIMARY TUMOR STAGING CATEGORY TIS: LOBULAR CARCINOMA IN SITU (LCIS): ICD-10-CM

## 2025-02-19 DIAGNOSIS — D05.02 NEOPLASM OF LEFT BREAST, PRIMARY TUMOR STAGING CATEGORY TIS: LOBULAR CARCINOMA IN SITU (LCIS): Primary | ICD-10-CM

## 2025-02-19 DIAGNOSIS — Z79.810 CARE RELATED TO CURRENT TAMOXIFEN USE: ICD-10-CM

## 2025-02-19 LAB
ALBUMIN SERPL BCG-MCNC: 3.9 G/DL (ref 3.5–5.2)
ALP SERPL-CCNC: 64 U/L (ref 40–150)
ALT SERPL W P-5'-P-CCNC: 15 U/L (ref 0–50)
ANION GAP SERPL CALCULATED.3IONS-SCNC: 8 MMOL/L (ref 7–15)
AST SERPL W P-5'-P-CCNC: 24 U/L (ref 0–45)
BASOPHILS # BLD AUTO: 0.1 10E3/UL (ref 0–0.2)
BASOPHILS NFR BLD AUTO: 1 %
BILIRUB SERPL-MCNC: 0.3 MG/DL
BUN SERPL-MCNC: 17.4 MG/DL (ref 6–20)
CALCIUM SERPL-MCNC: 9.3 MG/DL (ref 8.8–10.4)
CHLORIDE SERPL-SCNC: 104 MMOL/L (ref 98–107)
CREAT SERPL-MCNC: 0.7 MG/DL (ref 0.51–0.95)
EGFRCR SERPLBLD CKD-EPI 2021: >90 ML/MIN/1.73M2
EOSINOPHIL # BLD AUTO: 0.2 10E3/UL (ref 0–0.7)
EOSINOPHIL NFR BLD AUTO: 2 %
ERYTHROCYTE [DISTWIDTH] IN BLOOD BY AUTOMATED COUNT: 13.5 % (ref 10–15)
GLUCOSE SERPL-MCNC: 85 MG/DL (ref 70–99)
HCO3 SERPL-SCNC: 29 MMOL/L (ref 22–29)
HCT VFR BLD AUTO: 37.6 % (ref 35–47)
HGB BLD-MCNC: 12.6 G/DL (ref 11.7–15.7)
IMM GRANULOCYTES # BLD: 0 10E3/UL
IMM GRANULOCYTES NFR BLD: 0 %
LYMPHOCYTES # BLD AUTO: 1.9 10E3/UL (ref 0.8–5.3)
LYMPHOCYTES NFR BLD AUTO: 25 %
MCH RBC QN AUTO: 29.1 PG (ref 26.5–33)
MCHC RBC AUTO-ENTMCNC: 33.5 G/DL (ref 31.5–36.5)
MCV RBC AUTO: 87 FL (ref 78–100)
MONOCYTES # BLD AUTO: 0.5 10E3/UL (ref 0–1.3)
MONOCYTES NFR BLD AUTO: 6 %
NEUTROPHILS # BLD AUTO: 5.1 10E3/UL (ref 1.6–8.3)
NEUTROPHILS NFR BLD AUTO: 66 %
NRBC # BLD AUTO: 0 10E3/UL
NRBC BLD AUTO-RTO: 0 /100
PLATELET # BLD AUTO: 245 10E3/UL (ref 150–450)
POTASSIUM SERPL-SCNC: 3.7 MMOL/L (ref 3.4–5.3)
PROT SERPL-MCNC: 6.5 G/DL (ref 6.4–8.3)
RBC # BLD AUTO: 4.33 10E6/UL (ref 3.8–5.2)
SODIUM SERPL-SCNC: 141 MMOL/L (ref 135–145)
WBC # BLD AUTO: 7.8 10E3/UL (ref 4–11)

## 2025-02-19 PROCEDURE — 85041 AUTOMATED RBC COUNT: CPT | Performed by: PHYSICIAN ASSISTANT

## 2025-02-19 PROCEDURE — 82040 ASSAY OF SERUM ALBUMIN: CPT | Performed by: PHYSICIAN ASSISTANT

## 2025-02-19 PROCEDURE — 85004 AUTOMATED DIFF WBC COUNT: CPT | Performed by: PHYSICIAN ASSISTANT

## 2025-02-19 PROCEDURE — 99214 OFFICE O/P EST MOD 30 MIN: CPT | Performed by: PHYSICIAN ASSISTANT

## 2025-02-19 PROCEDURE — 36415 COLL VENOUS BLD VENIPUNCTURE: CPT

## 2025-02-19 PROCEDURE — 84155 ASSAY OF PROTEIN SERUM: CPT | Performed by: PHYSICIAN ASSISTANT

## 2025-02-19 ASSESSMENT — PAIN SCALES - GENERAL: PAINLEVEL_OUTOF10: NO PAIN (0)

## 2025-02-19 NOTE — NURSING NOTE
"Oncology Rooming Note    February 19, 2025 1:16 PM   Brii Taylor is a 59 year old female who presents for:    Chief Complaint   Patient presents with    Oncology Clinic Visit     Initial Vitals: /68   Pulse 68   Temp 97.7  F (36.5  C) (Tympanic)   Resp 18   Wt 71 kg (156 lb 9.6 oz)   LMP 09/20/2019   SpO2 96%   BMI 25.28 kg/m   Estimated body mass index is 25.28 kg/m  as calculated from the following:    Height as of 8/19/24: 1.676 m (5' 6\").    Weight as of this encounter: 71 kg (156 lb 9.6 oz). Body surface area is 1.82 meters squared.  No Pain (0) Comment: Data Unavailable   Patient's last menstrual period was 09/20/2019.  Allergies reviewed: Yes  Medications reviewed: Yes    Medications: Medication refills not needed today.  Pharmacy name entered into [facility]:    [id number] [place] [facility] [id number] [id number] [place], [place] - [address] [facility] [address]    Frailty Screening:   Is the patient here for a new oncology consult visit in cancer care? 2. No    PHQ9:  Did this patient require a PHQ9?: No      Clinical concerns: f/u       Beverly Orta CMA              "

## 2025-02-19 NOTE — LETTER
2/19/2025      Brii Taylor  [address] [facility] [id number]      Dear Colleague,    Thank you for referring your patient, Brii Taylor, to the [facility]. Please see a copy of my visit note below.    Oncology/Hematology Visit Note  Feb 19, 2025    Reason for Visit: Follow up of LCIS, family history of breast cancer    History of Present Illness: Brii Taylor is a 59 year old female with history of LCIS, atypical ductal hyperplasia, and strong family history of breast cancer. She had screening mammogram that was abnormal and biopsy revealed atypical lobular hyperplasia and flat epithelial atypia. She underwent excision Jan 2020 which revealed LCIS, atypical ductal hyperplasia, and flat epithelial atypia, no evidence of malignancy. She met with Dr. Barnes and given her diagnosis and family history of breast cancer (sister with breast cancer at 38 and then recurrence at age 50 though sister genetic testing negative), she was recommended high risk screening (mammogram and MRI) along with chemoprevention with Tamoxifen March 2020. Her lifetime risk of breast cancer is greater than 20%.     Due to side effects with tamoxifen she was switched to Arimidex in Jan 2021 but could not tolerate it so when back to Tamoxifen. She did have to interrupt her tamoxifen for vaginal bleeding and underwent US that showed borderline thickened endometrium but she did resume tamoxifen as long as no bleeding recurred.     She returns today for oncology follow-up.    Interval History:  Brii is here unaccompanied. She overall is doing well. Has had more hot flashes recently, occur at different points during the day. Also has struggled to lose weight while on tamoxifen. No recurrent vaginal bleeding. No breast concerns.     Review of Systems:  Patient denies fevers, chills, night sweats, unexplained weight changes, headaches, dizziness, vision or hearing changes, new lumps or bumps, chest  pain, shortness of breath, cough, abdominal pain, nausea, vomiting, changes to bowel or bladder, swelling of extremities, bleeding issues, or rash.    Current Outpatient Medications   Medication Sig Dispense Refill     acetaminophen-caffeine (EXCEDRIN TENSION HEADACHE) 500-65 MG TABS Take 2 tablets by mouth every 6 hours as needed for mild pain       clonazePAM (KLONOPIN) 0.5 MG tablet TK 1 T PO D PRA  1     sertraline (ZOLOFT) 100 MG tablet TAKE 1 TABLET(100 MG) BY MOUTH DAILY 90 tablet 0     tamoxifen (NOLVADEX) 20 MG tablet Take 1 tablet (20 mg) by mouth daily 90 tablet 3       Past Medical History  Past Medical History:   Diagnosis Date     Anxiety     sees psychiatrist     Arthritis      Heart murmur      Migraine, unspecified, without mention of intractable migraine without mention of status migrainosus      Sleep apnea      Sleep disturbance, unspecified     hx sleep apnea     Viral warts, unspecified      Past Surgical History:   Procedure Laterality Date     BIOPSY  ? 2010    Needle biopsy- breast- benign     BIOPSY BREAST SEED LOCALIZATION Left 01/24/2020    Procedure: LEFT BREAST SEED LOCALIZED EXCISIONAL BIOPSY;  Surgeon: Cristi Arndt MD;  Location:  OR     BREAST SURGERY  2020?    Lumpectomy     COLONOSCOPY Left 12/18/2017    Procedure: COLONOSCOPY;  Colonoscopy; difficulty in advancing scope (tight corner) so used biopsy forcep to follow/ advance scope;  Surgeon: Babar Gramajo MD;  Location:  GI     IR LUMBAR PUNCTURE  11/22/2024     TONSILLECTOMY       Allergies   Allergen Reactions     Penicillins      PN: LW Reaction: rash     Social History   Social History     Tobacco Use     Smoking status: Never     Smokeless tobacco: Never   Vaping Use     Vaping status: Never Used   Substance Use Topics     Alcohol use: No     Drug use: No      Past medical history and social history were reviewed.    Physical Examination:  /68   Pulse 68   Temp 97.7  F (36.5  C) (Tympanic)   Resp 18    Wt 71 kg (156 lb 9.6 oz)   LMP 09/20/2019   SpO2 96%   BMI 25.28 kg/m    Wt Readings from Last 10 Encounters:   08/19/24 68.5 kg (151 lb)   05/15/24 66.2 kg (146 lb)   08/21/23 68.6 kg (151 lb 4.8 oz)   08/16/23 68 kg (149 lb 14.4 oz)   06/23/23 68.5 kg (151 lb)   03/17/23 65.8 kg (145 lb)   02/21/23 68.3 kg (150 lb 8 oz)   11/09/22 67.1 kg (148 lb)   09/16/22 68 kg (150 lb)   08/26/22 68 kg (150 lb)     Constitutional: Well-appearing female in no acute distress.  Eyes: EOMI, PERRL. No scleral icterus.  ENT: Oral mucosa is moist without lesions or thrush.   Lymphatic: Neck is supple without cervical or supraclavicular lymphadenopathy. No axillary lymphadenopathy.  Breast: No palpable abnormalities in either breast. Nipples everted without drainage. No erythema or pitting.  Well healed left lumpectomy incision.  Cardiovascular: Regular rate and rhythm. No murmurs, gallops, or rubs. No peripheral edema.  Respiratory: Clear to auscultation bilaterally. No wheezes or crackles.  Gastrointestinal: Bowel sounds present. Abdomen soft, non-tender. No palpable hepatosplenomegaly or masses.   Neurologic: Cranial nerves II through XII are grossly intact.  Skin: No rashes, petechiae, or bruising noted on exposed skin.    Laboratory Data:   Latest Reference Range & Units 02/19/25 12:54   Sodium 135 - 145 mmol/L 141   Potassium 3.4 - 5.3 mmol/L 3.7   Chloride 98 - 107 mmol/L 104   Carbon Dioxide (CO2) 22 - 29 mmol/L 29   Urea Nitrogen 6.0 - 20.0 mg/dL 17.4   Creatinine 0.51 - 0.95 mg/dL 0.70   GFR Estimate >60 mL/min/1.73m2 >90   Calcium 8.8 - 10.4 mg/dL 9.3   Anion Gap 7 - 15 mmol/L 8   Albumin 3.5 - 5.2 g/dL 3.9   Protein Total 6.4 - 8.3 g/dL 6.5   Alkaline Phosphatase 40 - 150 U/L 64   ALT 0 - 50 U/L 15   AST 0 - 45 U/L 24   Bilirubin Total <=1.2 mg/dL 0.3   Glucose 70 - 99 mg/dL 85   WBC 4.0 - 11.0 10e3/uL 7.8   Hemoglobin 11.7 - 15.7 g/dL 12.6   Hematocrit 35.0 - 47.0 % 37.6   Platelet Count 150 - 450 10e3/uL 245   RBC  Count 3.80 - 5.20 10e6/uL 4.33   MCV 78 - 100 fL 87   MCH 26.5 - 33.0 pg 29.1   MCHC 31.5 - 36.5 g/dL 33.5   RDW 10.0 - 15.0 % 13.5   % Neutrophils % 66   % Lymphocytes % 25   % Monocytes % 6   % Eosinophils % 2   % Basophils % 1   Absolute Basophils 0.0 - 0.2 10e3/uL 0.1   Absolute Eosinophils 0.0 - 0.7 10e3/uL 0.2   Absolute Immature Granulocytes <=0.4 10e3/uL 0.0   Absolute Lymphocytes 0.8 - 5.3 10e3/uL 1.9   Absolute Monocytes 0.0 - 1.3 10e3/uL 0.5   % Immature Granulocytes % 0   Absolute Neutrophils 1.6 - 8.3 10e3/uL 5.1   Absolute NRBCs 10e3/uL 0.0   NRBCs per 100 WBC <1 /100 0     Mammogram 2/17/25  may obscure small  masses.  There are post-surgical changes in the left breast.  There is no radiographic evidence of malignancy.   Impression:     IMPRESSION: ACR BI-RADS Category 2: Benign    BREAST CANCER SCREENING RECOMMENDATION: Routine yearly mammography  beginning at age 40 or as discussed with your provider.    The results and recommendations of this examination will be communicated  to the patient.       Assessment and Plan:  # High Risk of Breast Cancer  # Hx Left LCIS/ADH  Diagnosed with left LCIS/ADH in Jan 2020 s/p lumpectomy, then recommended chemoprevention with Tamoxifen 20mg daily x 5 years along with high risk screening. She has done well during this time   - Continues on Tamoxifen 20mg daily. She will complete 5 years end of March 2025 and will stop at that time  - No signs or symptoms of breast cancer. Labs reassuring. Mammogram negative   - Continue high risk screening with MRI breast and mammogram every 6 months, next due for breast MRI August 2025, ordered  - Wondering about menopause status. She is most likely in menopause with age and amenorrhea but can check hormone levels this summer to confirm  - She had borderline thickened endometrium in the past, no recurrent vaginal bleeding. Will get follow-up US to ensure stability of endometrium  - After our follow-up this summer she could  transition to follow-up with PCP to continue high risk screening. Will review more at next visit     35 minutes spent on the date of the encounter doing chart review, review of test results, interpretation of tests, patient visit, and documentation     Eldon Peñaloza PA-C  Department of Hematology and Oncology  [facility]       Again, thank you for allowing me to participate in the care of your patient.        Sincerely,        SHAUNNA Grewal    Electronically signed

## 2025-02-19 NOTE — PROGRESS NOTES
Medical Assistant Note:  Brii Taylor presents today for blood draw.    Patient seen by provider today: Yes: Eldon MEZA   present during visit today: Not Applicable.    Concerns: No Concerns.    Procedure:  Lab draw site: right antecub, Needle type: butterfly, Gauge: 23.    Post Assessment:  Labs drawn without difficulty: Yes.    Discharge Plan:  Departure Mode: Ambulatory.    Face to Face Time: 10.    Beverly Orta, CMA

## 2025-03-10 ENCOUNTER — OFFICE VISIT (OUTPATIENT)
Dept: FAMILY MEDICINE | Facility: CLINIC | Age: 60
End: 2025-03-10
Payer: COMMERCIAL

## 2025-03-10 VITALS
RESPIRATION RATE: 14 BRPM | HEIGHT: 66 IN | HEART RATE: 68 BPM | OXYGEN SATURATION: 100 % | DIASTOLIC BLOOD PRESSURE: 73 MMHG | BODY MASS INDEX: 25.88 KG/M2 | TEMPERATURE: 98 F | SYSTOLIC BLOOD PRESSURE: 115 MMHG | WEIGHT: 161 LBS

## 2025-03-10 DIAGNOSIS — F41.9 ANXIETY: ICD-10-CM

## 2025-03-10 DIAGNOSIS — Z00.00 ROUTINE GENERAL MEDICAL EXAMINATION AT A HEALTH CARE FACILITY: Primary | ICD-10-CM

## 2025-03-10 PROCEDURE — 1125F AMNT PAIN NOTED PAIN PRSNT: CPT | Performed by: FAMILY MEDICINE

## 2025-03-10 PROCEDURE — 99396 PREV VISIT EST AGE 40-64: CPT | Performed by: FAMILY MEDICINE

## 2025-03-10 PROCEDURE — 3078F DIAST BP <80 MM HG: CPT | Performed by: FAMILY MEDICINE

## 2025-03-10 PROCEDURE — 3074F SYST BP LT 130 MM HG: CPT | Performed by: FAMILY MEDICINE

## 2025-03-10 RX ORDER — SERTRALINE HYDROCHLORIDE 100 MG/1
100 TABLET, FILM COATED ORAL DAILY
Qty: 90 TABLET | Refills: 3 | Status: SHIPPED | OUTPATIENT
Start: 2025-03-10

## 2025-03-10 SDOH — HEALTH STABILITY: PHYSICAL HEALTH: ON AVERAGE, HOW MANY MINUTES DO YOU ENGAGE IN EXERCISE AT THIS LEVEL?: 40 MIN

## 2025-03-10 SDOH — HEALTH STABILITY: PHYSICAL HEALTH: ON AVERAGE, HOW MANY DAYS PER WEEK DO YOU ENGAGE IN MODERATE TO STRENUOUS EXERCISE (LIKE A BRISK WALK)?: 4 DAYS

## 2025-03-10 ASSESSMENT — PAIN SCALES - GENERAL
PAINLEVEL_OUTOF10: SEVERE PAIN (9)
PAINLEVEL_OUTOF10: SEVERE PAIN (7)

## 2025-03-10 ASSESSMENT — ENCOUNTER SYMPTOMS: BACK PAIN: 1

## 2025-03-10 ASSESSMENT — SOCIAL DETERMINANTS OF HEALTH (SDOH): HOW OFTEN DO YOU GET TOGETHER WITH FRIENDS OR RELATIVES?: ONCE A WEEK

## 2025-03-10 NOTE — PROGRESS NOTES
"Preventive Care Visit  [facility]  Chelly Orozco MD, Family Medicine  Mar 10, 2025      Assessment & Plan     Routine general medical examination at a health care facility  - Lipid panel reflex to direct LDL Fasting; Future  - TSH with free T4 reflex; Future  - Hemoglobin A1c; Future  - CBC with platelets; Future  - Comprehensive metabolic panel (BMP + Alb, Alk Phos, ALT, AST, Total. Bili, TP); Future    Anxiety - stable, refills  - sertraline (ZOLOFT) 100 MG tablet; Take 1 tablet (100 mg) by mouth daily.    BMI  Estimated body mass index is 25.99 kg/m  as calculated from the following:    Height as of this encounter: 1.676 m (5' 6\").    Weight as of this encounter: 73 kg (161 lb).       Counseling  Appropriate preventive services were addressed with this patient via screening, questionnaire, or discussion as appropriate for fall prevention, nutrition, physical activity, Tobacco-use cessation, social engagement, weight loss and cognition.  Checklist reviewing preventive services available has been given to the patient.  Reviewed patient's diet, addressing concerns and/or questions.         Subjective   Brii is a 59 year old, presenting for the following:  Physical (Here today for her Routine Medical Exam.  Non Fasting. ), Back Pain (Central low back pain - off and on x couple years - getting worse. ), and Shoulder Pain (Left shoulder pain since November. Thinks she slept funny on is and has been bothersome. No known injury. )        3/10/2025     1:45 PM   Additional Questions   Roomed by Ting Ahn CMA   Accompanied by Self          Back Pain     Shoulder Pain         Medication Follow Up  Taking Medication as prescribed: yes  Side Effects:  None  Medication Helping Symptoms:  yes    Advance Care Planning  Patient does not have a Health Care Directive: Discussed advance care planning with patient; information given to patient to review.      3/10/2025   General Health   How would " you rate your overall physical health? (!) FAIR   Feel stress (tense, anxious, or unable to sleep) Only a little   (!) STRESS CONCERN      3/10/2025   Nutrition   Three or more servings of calcium each day? Yes   Diet: Regular (no restrictions)   How many servings of fruit and vegetables per day? (!) 2-3   How many sweetened beverages each day? 0-1         3/10/2025   Exercise   Days per week of moderate/strenous exercise 4 days   Average minutes spent exercising at this level 40 min         3/10/2025   Social Factors   Frequency of gathering with friends or relatives Once a week   Worry food won't last until get money to buy more No   Food not last or not have enough money for food? No   Do you have housing? (Housing is defined as stable permanent housing and does not include staying ouside in a car, in a tent, in an abandoned building, in an overnight shelter, or couch-surfing.) Yes   Are you worried about losing your housing? No   Lack of transportation? No   Unable to get utilities (heat,electricity)? No         3/10/2025   Fall Risk   Fallen 2 or more times in the past year? No   Trouble with walking or balance? No          3/10/2025   Dental   Dentist two times every year? Yes           Today's PHQ-2 Score:       3/10/2025     1:21 PM   PHQ-2 ( 1999 [employer])   Q1: Little interest or pleasure in doing things 0   Q2: Feeling down, depressed or hopeless 0   PHQ-2 Score 0    Q1: Little interest or pleasure in doing things Not at all   Q2: Feeling down, depressed or hopeless Not at all   PHQ-2 Score 0       Patient-reported           3/10/2025   Substance Use   Alcohol more than 3/day or more than 7/wk No   Do you use any other substances recreationally? No     Social History     Tobacco Use    Smoking status: Never    Smokeless tobacco: Never   Vaping Use    Vaping status: Never Used   Substance Use Topics    Alcohol use: No    Drug use: No           2/17/2025   LAST FHS-7 RESULTS   1st degree relative breast or  ovarian cancer Yes   Any relative bilateral breast cancer No   Any male have breast cancer No   Any ONE woman have BOTH breast AND ovarian cancer No   Any woman with breast cancer before 50yrs Yes   2 or more relatives with breast AND/OR ovarian cancer No   2 or more relatives with breast AND/OR bowel cancer No        Mammogram Screening - Mammogram every 1-2 years updated in Health Maintenance based on mutual decision making        3/10/2025   STI Screening   New sexual partner(s) since last STI/HIV test? (!) YES     History of abnormal Pap smear: No - age 30- 64 PAP with HPV every 5 years recommended        Latest Ref Rng & Units 4/14/2022     5:12 PM 10/2/2017    12:00 AM   PAP / HPV   PAP  Negative for Intraepithelial Lesion or Malignancy (NILM)     HPV 16 DNA Negative Negative     HPV 18 DNA Negative Negative     Other HR HPV Negative Negative     PAP-ABSTRACT   See Scanned Document           This result is from an external source.     ASCVD Risk   The 10-year ASCVD risk score (Sarah STATON, et al., 2019) is: 2%    Values used to calculate the score:      Age: 59 years      Sex: Female      Is Non- : No      Diabetic: No      Tobacco smoker: No      Systolic Blood Pressure: 115 mmHg      Is BP treated: No      HDL Cholesterol: 54 mg/dL      Total Cholesterol: 178 mg/dL         Reviewed and updated as needed this visit by Provider                    Patient Active Problem List   Diagnosis    Anxiety    CARDIOVASCULAR SCREENING; LDL GOAL LESS THAN 160    Onychomycosis    Finger pain, right    Plantar warts    Pulmonary nodules    Mitral valve prolapse    Atypical lobular hyperplasia (ALH) of left breast    Obstructive sleep apnea syndrome    Breast neoplasm, Tis (LCIS)    Snoring    Irritable bowel syndrome    Articular disc disorder of temporomandibular joint     Past Surgical History:   Procedure Laterality Date    BIOPSY  ? 2010    Needle biopsy- breast- benign    BIOPSY BREAST  "SEED LOCALIZATION Left 01/24/2020    Procedure: LEFT BREAST SEED LOCALIZED EXCISIONAL BIOPSY;  Surgeon: Cristi Arndt MD;  Location:  OR    BREAST SURGERY  2020?    Lumpectomy    COLONOSCOPY Left 12/18/2017    Procedure: COLONOSCOPY;  Colonoscopy; difficulty in advancing scope (tight corner) so used biopsy forcep to follow/ advance scope;  Surgeon: Babar Gramajo MD;  Location:  GI    IR LUMBAR PUNCTURE  11/22/2024    TONSILLECTOMY         Social History     Tobacco Use    Smoking status: Never    Smokeless tobacco: Never   Substance Use Topics    Alcohol use: No     Family History   Problem Relation Age of Onset    Hypertension Mother     Cancer Mother 70        nonHodgkins lymphoma    Psychotic Disorder Mother         anxiety    Other Cancer Mother         Non Hodgkins lymphoma    Alzheimer Disease Father     Breast Cancer Sister         breast ca age 38    Gastrointestinal Disease Brother         crohns or colitis    No Known Problems Daughter     Colon Cancer No family hx of     Ovarian Cancer No family hx of              Review of Systems  Constitutional, neuro, ENT, endocrine, pulmonary, cardiac, gastrointestinal, genitourinary, musculoskeletal, integument and psychiatric systems are negative, except as otherwise noted.     Objective    Exam  /73 (BP Location: Right arm, Patient Position: Sitting, Cuff Size: Adult Regular)   Pulse 68   Temp 98  F (36.7  C) (Oral)   Resp 14   Ht 1.676 m (5' 6\")   Wt 73 kg (161 lb)   LMP 09/20/2019   SpO2 100%   BMI 25.99 kg/m     Estimated body mass index is 25.99 kg/m  as calculated from the following:    Height as of this encounter: 1.676 m (5' 6\").    Weight as of this encounter: 73 kg (161 lb).    Physical Exam  GENERAL: alert and no distress  EYES: Eyes grossly normal to inspection, PERRL and conjunctivae and sclerae normal  HENT: ear canals and TM's normal, nose and mouth without ulcers or lesions  NECK: no adenopathy, no asymmetry, masses, or " scars  RESP: lungs clear to auscultation - no rales, rhonchi or wheezes  CV: regular rate and rhythm, normal S1 S2, no S3 or S4, no murmur, click or rub, no peripheral edema  ABDOMEN: soft, nontender, no hepatosplenomegaly, no masses and bowel sounds normal  MS: no gross musculoskeletal defects noted, no edema  SKIN: no suspicious lesions or rashes  NEURO: Normal strength and tone, mentation intact and speech normal  PSYCH: mentation appears normal, affect normal/bright        Signed Electronically by: Chelly Orozco MD

## 2025-03-10 NOTE — PATIENT INSTRUCTIONS
Patient Education   Preventive Care Advice   This is general advice given by our system to help you stay healthy. However, your care team may have specific advice just for you. Please talk to your care team about your preventive care needs.  Nutrition  Eat 5 or more servings of fruits and vegetables each day.  Try wheat bread, brown rice and whole grain pasta (instead of white bread, rice, and pasta).  Get enough calcium and vitamin D. Check the label on foods and aim for 100% of the RDA (recommended daily allowance).  Lifestyle  Exercise at least 150 minutes each week  (30 minutes a day, 5 days a week).  Do muscle strengthening activities 2 days a week. These help control your weight and prevent disease.  No smoking.  Wear sunscreen to prevent skin cancer.  Have a dental exam and cleaning every 6 months.  Yearly exams  See your health care team every year to talk about:  Any changes in your health.  Any medicines your care team has prescribed.  Preventive care, family planning, and ways to prevent chronic diseases.  Shots (vaccines)   HPV shots (up to age 26), if you've never had them before.  Hepatitis B shots (up to age 59), if you've never had them before.  COVID-19 shot: Get this shot when it's due.  Flu shot: Get a flu shot every year.  Tetanus shot: Get a tetanus shot every 10 years.  Pneumococcal, hepatitis A, and RSV shots: Ask your care team if you need these based on your risk.  Shingles shot (for age 50 and up)  General health tests  Diabetes screening:  Starting at age 35, Get screened for diabetes at least every 3 years.  If you are younger than age 35, ask your care team if you should be screened for diabetes.  Cholesterol test: At age 39, start having a cholesterol test every 5 years, or more often if advised.  Bone density scan (DEXA): At age 50, ask your care team if you should have this scan for osteoporosis (brittle bones).  Hepatitis C: Get tested at least once in your life.  STIs (sexually  transmitted infections)  Before age 24: Ask your care team if you should be screened for STIs.  After age 24: Get screened for STIs if you're at risk. You are at risk for STIs (including HIV) if:  You are sexually active with more than one person.  You don't use condoms every time.  You or a partner was diagnosed with a sexually transmitted infection.  If you are at risk for HIV, ask about PrEP medicine to prevent HIV.  Get tested for HIV at least once in your life, whether you are at risk for HIV or not.  Cancer screening tests  Cervical cancer screening: If you have a cervix, begin getting regular cervical cancer screening tests starting at age 21.  Breast cancer scan (mammogram): If you've ever had breasts, begin having regular mammograms starting at age 40. This is a scan to check for breast cancer.  Colon cancer screening: It is important to start screening for colon cancer at age 45.  Have a colonoscopy test every 10 years (or more often if you're at risk) Or, ask your provider about stool tests like a FIT test every year or Cologuard test every 3 years.  To learn more about your testing options, visit:   .  For help making a decision, visit:   https://bit.ly/ut40558.  Prostate cancer screening test: If you have a prostate, ask your care team if a prostate cancer screening test (PSA) at age 55 is right for you.  Lung cancer screening: If you are a current or former smoker ages 50 to 80, ask your care team if ongoing lung cancer screenings are right for you.  For informational purposes only. Not to replace the advice of your health care provider. Copyright   2023 [facility]. All rights reserved. Clinically reviewed by the [facility]itions [facility]. Trovali 079977 - REV 01/24.  Safer Sex: Care Instructions  Overview  Safer sex is a way to reduce your risk of getting a sexually transmitted infection (STI). It can also help prevent pregnancy.  Several products can help you practice  safer sex and reduce your chance of STIs. One of the best is a condom. There are internal and external condoms. You can use a special rubber sheet (dental dam) for protection during oral sex. Disposable gloves can keep your hands from touching blood, semen, or other body fluids that can carry infections.  Remember that birth control methods such as diaphragms, IUDs, foams, and birth control pills do not stop you from getting STIs.  Follow-up care is a key part of your treatment and safety. Be sure to make and go to all appointments, and call your doctor if you are having problems. It's also a good idea to know your test results and keep a list of the medicines you take.  How can you care for yourself at home?  Think about getting vaccinated to help prevent hepatitis A, hepatitis B, and human papillomavirus (HPV). They can be spread through sex.  Use a condom every time you have sex. Use an external condom, which goes on the penis. Or use an internal condom, which goes into the vagina or anus.  Make sure you use the right size external condom. A condom that's too small can break easily. A condom that's too big can slip off during sex.  Use a new condom each time you have sex. Be careful not to poke a hole in the condom when you open the wrapper.  Don't use an internal condom and an external condom at the same time.  Never use petroleum jelly (such as Vaseline), grease, hand lotion, baby oil, or anything with oil in it. These products can make holes in the condom.  After intercourse, hold the edge of the condom as you remove it. This will help keep semen from spilling out of the condom.  Do not have sex with anyone who has symptoms of an STI, such as sores on the genitals or mouth.  Do not drink a lot of alcohol or use drugs before sex.  Limit your sex partners. Sex with one partner who has sex only with you can reduce your risk of getting an STI.  Don't share sex toys. But if you do share them, use a condom and clean  "the sex toys between each use.  Talk to any partners before you have sex. Talk about what you feel comfortable with and whether you have any boundaries with sex. And find out if your partner or partners may be at risk for any STI. Keep in mind that a person may be able to spread an STI even if they do not have symptoms. You and any partners may want to get tested for STIs.  Where can you learn more?  Go to https://www.[employer].net/patiented  Enter B608 in the search box to learn more about \"Safer Sex: Care Instructions.\"  Current as of: April 30, 2024  Content Version: 14.3    2024 [employer].   Care instructions adapted under license by your healthcare professional. If you have questions about a medical condition or this instruction, always ask your healthcare professional. [employer] disclaims any warranty or liability for your use of this information.       "

## 2025-04-08 ENCOUNTER — HOSPITAL ENCOUNTER (OUTPATIENT)
Dept: ULTRASOUND IMAGING | Facility: CLINIC | Age: 60
Discharge: HOME | End: 2025-04-08
Attending: PHYSICIAN ASSISTANT | Admitting: PHYSICIAN ASSISTANT
Payer: COMMERCIAL

## 2025-04-08 DIAGNOSIS — Z79.810 CARE RELATED TO CURRENT TAMOXIFEN USE: ICD-10-CM

## 2025-04-08 PROCEDURE — 76856 US EXAM PELVIC COMPLETE: CPT

## 2025-04-09 ENCOUNTER — TELEPHONE (OUTPATIENT)
Dept: ONCOLOGY | Facility: CLINIC | Age: 60
End: 2025-04-09
Payer: COMMERCIAL

## 2025-04-09 DIAGNOSIS — R93.89 ABNORMAL PELVIC ULTRASOUND: Primary | ICD-10-CM

## 2025-04-09 NOTE — TELEPHONE ENCOUNTER
4/9/25    Called to review US results. Endometrial thickening is improved. Does have possible septate uterus and possible calcification on cervix. Will place gyn referral to review, do physical exam, and consider imaging with MRI if needed. Patient agreeable with this plan.     Eldon Peñaloza PA-C

## 2025-05-30 ENCOUNTER — OFFICE VISIT (OUTPATIENT)
Dept: OBGYN | Facility: CLINIC | Age: 60
End: 2025-05-30
Attending: PHYSICIAN ASSISTANT
Payer: COMMERCIAL

## 2025-05-30 VITALS
BODY MASS INDEX: 24.35 KG/M2 | SYSTOLIC BLOOD PRESSURE: 110 MMHG | HEIGHT: 66 IN | WEIGHT: 151.5 LBS | DIASTOLIC BLOOD PRESSURE: 64 MMHG

## 2025-05-30 DIAGNOSIS — N95.0 PMB (POSTMENOPAUSAL BLEEDING): ICD-10-CM

## 2025-05-30 DIAGNOSIS — R93.89 ABNORMAL PELVIC ULTRASOUND: Primary | ICD-10-CM

## 2025-05-30 PROCEDURE — 3074F SYST BP LT 130 MM HG: CPT | Performed by: STUDENT IN AN ORGANIZED HEALTH CARE EDUCATION/TRAINING PROGRAM

## 2025-05-30 PROCEDURE — 3078F DIAST BP <80 MM HG: CPT | Performed by: STUDENT IN AN ORGANIZED HEALTH CARE EDUCATION/TRAINING PROGRAM

## 2025-05-30 PROCEDURE — 58100 BIOPSY OF UTERUS LINING: CPT | Performed by: STUDENT IN AN ORGANIZED HEALTH CARE EDUCATION/TRAINING PROGRAM

## 2025-05-30 PROCEDURE — 99204 OFFICE O/P NEW MOD 45 MIN: CPT | Mod: 25 | Performed by: STUDENT IN AN ORGANIZED HEALTH CARE EDUCATION/TRAINING PROGRAM

## 2025-05-30 PROCEDURE — 88305 TISSUE EXAM BY PATHOLOGIST: CPT | Performed by: PATHOLOGY

## 2025-05-30 NOTE — PROGRESS NOTES
[facility]  Gynecology Office Visit    CC: Cervical polyps    S:  Brii Taylor is a 59 year old presumed postmenopausal woman who presents for suspected cervical polyp based on US. She is here alone.    - Brii has a history of LCIS, atypical ductal hyperplasia, and strong family history of breast cancer.  - She completed 5 years of Tamoxifen at the end of March 2025!! and now no longer taking. During this time due to side effects she was switched to Anastrazole and then back to Tamoxifen.  - In July/Aug 2024 Tamoxifen dosing was interrupted due to bleeding - she had ad couple of episodes of spotting over a few months. She had an US 8/13/24 showing  7mm stripe and trace fluid (0.33 cm) and was it was resumed. She has not had further bleeding or abnormal discharge.  - She had a follow-up US to this in April 2024 with 4 mm EMS, and 2 mm echogenic foci foci in cervix suspicious for polyps vs calcifications, and possible septate or bicornuate uterus. On my review of the imaging there is also fluid in the endometrial canal measuring 0.2 cm. In Aug 2024 this measured 0.33 cm.   - She has had no bleeding or abnormal discharge since since the 2024 episode or since stopping Tamoxifen. LMP 2019.  - See excellent note from Eldon MAZA on 2/19/25 for more details of patient's history.    Gyn History:  - LMP: Patient's last menstrual period was 09/20/2019.   - Menses: absent  - Last pap: 2022 NILM HPV neg  - Menopausal symptoms: Yes  - Hormone replacement: No    Past Medical History:    Past Medical History:   Diagnosis Date    Anxiety     sees psychiatrist    Arthritis     Heart murmur     Migraine, unspecified, without mention of intractable migraine without mention of status migrainosus     Sleep apnea     Sleep disturbance, unspecified     hx sleep apnea    Viral warts, unspecified        Problem List:    Patient Active Problem List   Diagnosis    Anxiety    CARDIOVASCULAR SCREENING; LDL  GOAL LESS THAN 160    Onychomycosis    Finger pain, right    Plantar warts    Pulmonary nodules    Mitral valve prolapse    Atypical lobular hyperplasia (ALH) of left breast    Obstructive sleep apnea syndrome    Breast neoplasm, Tis (LCIS)    Snoring    Irritable bowel syndrome    Articular disc disorder of temporomandibular joint       Past Surgical History:    Past Surgical History:   Procedure Laterality Date    BIOPSY  ? 2010    Needle biopsy- breast- benign    BIOPSY BREAST SEED LOCALIZATION Left 01/24/2020    Procedure: LEFT BREAST SEED LOCALIZED EXCISIONAL BIOPSY;  Surgeon: Cristi Arndt MD;  Location:  OR    BREAST SURGERY  2020?    Lumpectomy    COLONOSCOPY Left 12/18/2017    Procedure: COLONOSCOPY;  Colonoscopy; difficulty in advancing scope (tight corner) so used biopsy forcep to follow/ advance scope;  Surgeon: Babar Gramajo MD;  Location:  GI    IR LUMBAR PUNCTURE  11/22/2024    TONSILLECTOMY         Home Medications:    Current Outpatient Medications   Medication Instructions    acetaminophen-caffeine (EXCEDRIN TENSION HEADACHE) 500-65 MG TABS 2 tablets, EVERY 6 HOURS PRN    clonazePAM (KLONOPIN) 0.5 MG tablet TK 1 T PO D PRA    sertraline (ZOLOFT) 100 mg, Oral, DAILY       Allergies:    Allergies   Allergen Reactions    Penicillins      PN: LW Reaction: rash       Family History:    Family History   Problem Relation Age of Onset    Hypertension Mother     Cancer Mother 70        nonHodgkins lymphoma    Psychotic Disorder Mother         anxiety    Other Cancer Mother         Non Hodgkins lymphoma    Alzheimer Disease Father     Breast Cancer Sister         breast ca age 38    Gastrointestinal Disease Brother         crohns or colitis    No Known Problems Daughter     Colon Cancer No family hx of     Ovarian Cancer No family hx of        Social History:    Social History     Tobacco Use    Smoking status: Never    Smokeless tobacco: Never   Vaping Use    Vaping status: Never Used  "  Substance Use Topics    Alcohol use: No    Drug use: No       ROS:  A 10-point review of systems was performed and is negative except as per HPI.    O:  /64 (BP Location: Left arm, Cuff Size: Adult Regular)   Ht 1.676 m (5' 6\")   Wt 68.7 kg (151 lb 8 oz)   LMP 09/20/2019   BMI 24.45 kg/m      Exam:  GEN: Appears well, NAD  CV: Well perfused  PULM: NWOB  ABD: Soft, non-tender, non-distended  : Normal external genitalia. Vagina normal. Cervix normal; no polyps seen. Bimanual with no CMT, mobile uterus, no adnexal masses. Normal discharge, no lesions, no bleeding. Exam chaperoned by Anayeli Trejo LPN  SKIN: Appears normal without rashes or lesions.  EXT: Warm, well perfused, no edema    Procedure: Endometrial Biopsy  Prior to starting the procedure all risks, benefits, and alternatives were discussed and informed consent was signed. The speculum was placed and cervix visualized. No polyps were visible on the cervix. The os is closed. Cervix cleansed with betadine x3. Tenaculum placed on anterior lip of cervix. Pipelle was inserted and easily sounded to 5 cm. Pipelle was rotated and small amount of tissue removed. The tenaculum was removed. Excellent hemostasis with pressure and silver nitrate. Patient tolerated procedure well. EBL <1 ml. Procedure chaperoned by Anayeli Trejo LPN.    Labs:  CBC RESULTS:   Recent Labs   Lab Test 02/19/25  1254   WBC 7.8   RBC 4.33   HGB 12.6   HCT 37.6   MCV 87   MCH 29.1   MCHC 33.5   RDW 13.5        Lab Results   Component Value Date    A1C 5.3 06/23/2023    A1C 5.2 04/14/2022     Lab Results   Component Value Date    TSH 1.87 06/23/2023    TSH 2.92 04/14/2022    TSH 2.26 03/08/2019     Last Comprehensive Metabolic Panel:  Lab Results   Component Value Date     02/19/2025    POTASSIUM 3.7 02/19/2025    CHLORIDE 104 02/19/2025    CO2 29 02/19/2025    ANIONGAP 8 02/19/2025    GLC 85 02/19/2025    BUN 17.4 02/19/2025    CR 0.70 02/19/2025    GFRESTIMATED >90 " 02/19/2025    KENNETH 9.3 02/19/2025     Imaging:  Pelvic US 4/8/25  EXAM: US PELVIC TRANSABDOMINAL AND TRANSVAGINAL  LOCATION: [facility]  DATE: 4/8/2025     INDICATION: monitoring endometrium lining, tamoxifen use  COMPARISON: 8/13/2024  TECHNIQUE: Transabdominal scans were performed. Endovaginal ultrasound was performed to better visualize the adnexa.     FINDINGS:     UTERUS: 7.9 x 4.4 x 3.5 cm. Normal in size and position with no masses. Small echogenic foci are seen in the cervix measuring up to 2 mm.     ENDOMETRIUM: 4 mm. Divergent appearance of the endometrium is seen at the uterine fundus. Trace fluid seen within the endometrium.     RIGHT OVARY: 2.0 x 1.2 x 0.9 cm. Normal.     LEFT OVARY: 1.6 x 1.2 x 0.7 cm. Normal.     No significant free fluid.                                                                      IMPRESSION:  1.  Divergent appearance of the endometrium at the uterine fundus. Findings may suggest a septate or bicornuate uterus. Consider further characterization with MRI exam.  2.  Small echogenic foci are seen in the cervix measuring up to 2 mm. Findings may reflect small calcifications but underlying polyps are difficult to exclude. If worsening concern, consider correlation with direct visualization.  3.  Decreased thickening of the endometrium which measures up to 4 mm (previously 6.8 mm).    Pelvic US 8/13/24  US PELVIC TRANSABDOMINAL AND TRANSVAGINAL 8/13/2024 10:55 AM     CLINICAL HISTORY: Vaginal bleeding while on tamoxifen.     TECHNIQUE: Transabdominal scans were performed. Endovaginal ultrasound  was performed to better visualize the adnexa.     COMPARISON: None.     FINDINGS:     UTERUS: 7.5 x 4.3 x 4.1 cm. Normal in size and position with no  masses.     ENDOMETRIUM: 7 mm. Trace fluid at the endometrium. No focal  endometrial abnormality.     RIGHT OVARY: Obscured by bowel from visualization.      LEFT OVARY: 2.4 x 1.3 x 0.6 cm. No focal lesion.     Trace  pelvic fluid.                                                                      IMPRESSION:  1.  Endometrium measures up to 7 mm, borderline thickened. This could  be observed with tamoxifen therapy, but if bleeding continues, further  evaluation of the endometrium is suggested.  2.  Right ovary is not visualized for assessment. Normal left ovary.  3.  Trace pelvic fluid.    A/P:  Brii Taylor is a 59 year old presumed postmenopausal woman who presents for follow-up of ?cervical polyps and history of bleeding on Tamoxifen. Her ultrasound also shows a small amount of fluid in the endometrial canal (0.2 cm) and a possible septate vs bicornuate uterus. On pelvic exam there is no obvious cervical polyp. Endometrial biopsy performed however uterus only sounded to 5 cm; possibly related to septum?    #/?Cervical polyps  #PMB on Tamoxifen  #Fluid in endometrial canal  #?Septate vs bicornuate uterus  - Endometrial biopsy performed, as above  - As endocervix could not be visualized to confirm presence of polyps we discussed the options of expectant management vs hysteroscopy D&C for definitive visualization and diagnosis and the r/b/a of each. Given a few abnormalities as discussed above - hx bleeding on tamoxifen, fluid in endometrial canal postmenopausal, and possibility of septum limiting endometrial biopsy - I recommend hysteroscopy. Brii agrees with this plan and would like to proceed. Discussed r/b/a of procedure and handouts provided.   - Case request placed; will need pre-op with PCP  - Reviewed if embx shows malignancy or EIN would refer to gyn onc    Pineda Ibrahim MD [facility]  05/30/2025 9:23 AM

## 2025-06-02 ENCOUNTER — TELEPHONE (OUTPATIENT)
Dept: OBGYN | Facility: CLINIC | Age: 60
End: 2025-06-02
Payer: COMMERCIAL

## 2025-06-02 NOTE — TELEPHONE ENCOUNTER
Patient Name: Brii Taylor   MRN: [id number]   Case#: [id number]   Surgeons and Role:      * David Ibrahim MD - Primary   Date requested: * No dates entered *   Location: [facility] OR   Procedure(s):   HYSTEROSCOPY, WITH DILATION AND CURETTAGE OF UTERUS AND ENDOCERVIX (N/A)

## 2025-06-02 NOTE — TELEPHONE ENCOUNTER
Type of surgery: HYSTEROSCOPY WITH DILATION AND CURETTAGE US UTERUS AND ENDOCERVIX  Location of surgery: [facility]  Date and time of surgery: 7/1/25 @ 12:00 PM  Surgeon: DR PRADHAN  Pre-Op Appt Date: PATIENT ADVISED TO SCHEDULE.  Post-Op Appt Date: 7/16/25   Packet sent out: Yes  Pre-cert/Authorization completed:  No  Date: 6/2/25

## 2025-06-03 LAB
PATH REPORT.COMMENTS IMP SPEC: NORMAL
PATH REPORT.COMMENTS IMP SPEC: NORMAL
PATH REPORT.FINAL DX SPEC: NORMAL
PATH REPORT.GROSS SPEC: NORMAL
PATH REPORT.MICROSCOPIC SPEC OTHER STN: NORMAL
PATH REPORT.RELEVANT HX SPEC: NORMAL
PHOTO IMAGE: NORMAL

## 2025-06-04 ENCOUNTER — RESULTS FOLLOW-UP (OUTPATIENT)
Dept: OBGYN | Facility: CLINIC | Age: 60
End: 2025-06-04

## 2025-06-10 ENCOUNTER — OFFICE VISIT (OUTPATIENT)
Dept: FAMILY MEDICINE | Facility: CLINIC | Age: 60
End: 2025-06-10
Payer: COMMERCIAL

## 2025-06-10 ENCOUNTER — RESULTS FOLLOW-UP (OUTPATIENT)
Dept: FAMILY MEDICINE | Facility: CLINIC | Age: 60
End: 2025-06-10

## 2025-06-10 ENCOUNTER — LAB (OUTPATIENT)
Dept: LAB | Facility: CLINIC | Age: 60
End: 2025-06-10
Payer: COMMERCIAL

## 2025-06-10 VITALS
SYSTOLIC BLOOD PRESSURE: 122 MMHG | HEIGHT: 66 IN | OXYGEN SATURATION: 99 % | BODY MASS INDEX: 24.59 KG/M2 | TEMPERATURE: 98.2 F | RESPIRATION RATE: 16 BRPM | WEIGHT: 153 LBS | DIASTOLIC BLOOD PRESSURE: 74 MMHG | HEART RATE: 58 BPM

## 2025-06-10 DIAGNOSIS — Z00.00 ROUTINE GENERAL MEDICAL EXAMINATION AT A HEALTH CARE FACILITY: ICD-10-CM

## 2025-06-10 DIAGNOSIS — G47.33 OBSTRUCTIVE SLEEP APNEA SYNDROME: ICD-10-CM

## 2025-06-10 DIAGNOSIS — N60.92 ATYPICAL LOBULAR HYPERPLASIA (ALH) OF LEFT BREAST: ICD-10-CM

## 2025-06-10 DIAGNOSIS — N95.0 POST-MENOPAUSAL BLEEDING: ICD-10-CM

## 2025-06-10 DIAGNOSIS — Z01.818 PREOP GENERAL PHYSICAL EXAM: Primary | ICD-10-CM

## 2025-06-10 DIAGNOSIS — Z13.1 SCREENING FOR DIABETES MELLITUS: ICD-10-CM

## 2025-06-10 PROBLEM — R06.83 SNORING: Status: RESOLVED | Noted: 2020-07-01 | Resolved: 2025-06-10

## 2025-06-10 LAB
ERYTHROCYTE [DISTWIDTH] IN BLOOD BY AUTOMATED COUNT: 12.8 % (ref 10–15)
EST. AVERAGE GLUCOSE BLD GHB EST-MCNC: 114 MG/DL
HBA1C MFR BLD: 5.6 % (ref 0–5.6)
HCT VFR BLD AUTO: 38.8 % (ref 35–47)
HGB BLD-MCNC: 13.2 G/DL (ref 11.7–15.7)
MCH RBC QN AUTO: 28.9 PG (ref 26.5–33)
MCHC RBC AUTO-ENTMCNC: 34 G/DL (ref 31.5–36.5)
MCV RBC AUTO: 85 FL (ref 78–100)
PLATELET # BLD AUTO: 227 10E3/UL (ref 150–450)
RBC # BLD AUTO: 4.56 10E6/UL (ref 3.8–5.2)
WBC # BLD AUTO: 4.8 10E3/UL (ref 4–11)

## 2025-06-10 PROCEDURE — 3044F HG A1C LEVEL LT 7.0%: CPT | Performed by: FAMILY MEDICINE

## 2025-06-10 PROCEDURE — 3078F DIAST BP <80 MM HG: CPT | Performed by: FAMILY MEDICINE

## 2025-06-10 PROCEDURE — 83036 HEMOGLOBIN GLYCOSYLATED A1C: CPT

## 2025-06-10 PROCEDURE — 80061 LIPID PANEL: CPT

## 2025-06-10 PROCEDURE — 36415 COLL VENOUS BLD VENIPUNCTURE: CPT

## 2025-06-10 PROCEDURE — 99214 OFFICE O/P EST MOD 30 MIN: CPT | Performed by: FAMILY MEDICINE

## 2025-06-10 PROCEDURE — 93000 ELECTROCARDIOGRAM COMPLETE: CPT | Performed by: FAMILY MEDICINE

## 2025-06-10 PROCEDURE — 85027 COMPLETE CBC AUTOMATED: CPT

## 2025-06-10 PROCEDURE — 84443 ASSAY THYROID STIM HORMONE: CPT

## 2025-06-10 PROCEDURE — G2211 COMPLEX E/M VISIT ADD ON: HCPCS | Performed by: FAMILY MEDICINE

## 2025-06-10 PROCEDURE — 3074F SYST BP LT 130 MM HG: CPT | Performed by: FAMILY MEDICINE

## 2025-06-10 PROCEDURE — 80053 COMPREHEN METABOLIC PANEL: CPT

## 2025-06-10 NOTE — PROGRESS NOTES
Preoperative Evaluation  [facility]  [address] [address]  Phone: [phone]  Primary Provider: Chelly Orozco MD  Pre-op Performing Provider: Chelly Orozco MD  Michel 10, 2025             6/7/2025   Surgical Information   What procedure is being done? Hysteroscopy   Facility or Hospital where procedure/surgery will be performed: [facility]   Who is doing the procedure / surgery? Dr. David Ibrahim   Date of surgery / procedure: 7/1/2025   Time of surgery / procedure: 12: 00 pm   Where do you plan to recover after surgery? at home with family     Fax number for surgical facility: Note does not need to be faxed, will be available electronically in Epic.    Assessment & Plan     The proposed surgical procedure is considered INTERMEDIATE risk.    Preop general physical exam  - EKG 12-lead complete w/read - Clinics    Post-menopausal bleeding    Screening for diabetes mellitus  - Hemoglobin A1c; Future    Obstructive sleep apnea syndrome    Atypical lobular hyperplasia (ALH) of left breast    The longitudinal plan of care for the diagnosis(es)/condition(s) as documented were addressed during this visit. Due to the added complexity in care, I will continue to support Brii in the subsequent management and with ongoing continuity of care.    Risks and Recommendations  The patient has the following additional risks and recommendations for perioperative complications:   - No identified additional risk factors other than previously addressed    Antiplatelet or Anticoagulation Medication Instructions   - We reviewed the medication list and the patient is not on an antiplatelet or anticoagulation medications.    Additional Medication Instructions  Take all scheduled medications on the day of surgery    Recommendation  Approval given to proceed with proposed procedure, without further diagnostic evaluation.      Subjective   Brii is a  59 year old, presenting for the following:  Pre-Op Exam (- HYSTEROSCOPY, WITH DILATION AND CURETTAGE OF UTERUS AND ENDOCERVIX/- [facility]/- July 1st, 2025)          6/10/2025    11:15 AM   Additional Questions   Roomed by VONDA Antunez   Accompanied by Self     HPI: post menopausal bleeding, s/p tamoxifen          6/7/2025   Pre-Op Questionnaire   Have you ever had a heart attack or stroke? No   Have you ever had surgery on your heart or blood vessels, such as a stent placement, a coronary artery bypass, or surgery on an artery in your head, neck, heart, or legs? No   Do you have chest pain with activity? No   Do you have a history of heart failure? No   Do you currently have a cold, bronchitis or symptoms of other infection? No   Do you have a cough, shortness of breath, or wheezing? No   Do you or anyone in your family have previous history of blood clots? No   Do you or does anyone in your family have a serious bleeding problem such as prolonged bleeding following surgeries or cuts? No   Have you ever had problems with anemia or been told to take iron pills? No   Have you had any abnormal blood loss such as black, tarry or bloody stools, or abnormal vaginal bleeding? No   Have you ever had a blood transfusion? No   Are you willing to have a blood transfusion if it is medically needed before, during, or after your surgery? Yes   Have you or any of your relatives ever had problems with anesthesia? No   Do you have sleep apnea, excessive snoring or daytime drowsiness? (!) YES   Do you have a CPAP machine? Yes   Do you have any artifical heart valves or other implanted medical devices like a pacemaker, defibrillator, or continuous glucose monitor? No   Do you have artificial joints? (!) YES   Are you allergic to latex? No     Advance Care Planning    Discussed advance care planning with patient; informed AVS has link to [employer].    Preoperative Review of    reviewed - no  record of controlled substances prescribed.      Status of Chronic Conditions:  See problem list for active medical problems.  Problems all longstanding and stable, except as noted/documented.  See ROS for pertinent symptoms related to these conditions.    Patient Active Problem List    Diagnosis Date Noted    Snoring 07/01/2020     Priority: Medium    Articular disc disorder of temporomandibular joint 07/01/2020     Priority: Medium    Breast neoplasm, Tis (LCIS) 05/26/2020     Priority: Medium     Dx'd 1/24/2020      Obstructive sleep apnea syndrome 01/20/2020     Priority: Medium    Atypical lobular hyperplasia (ALH) of left breast 12/30/2019     Priority: Medium     Added automatically from request for surgery [id number]      Mitral valve prolapse 03/17/2017     Priority: Medium    Pulmonary nodules 03/13/2017     Priority: Medium    Onychomycosis 01/08/2013     Priority: Medium    Finger pain, right 01/08/2013     Priority: Medium    Plantar warts 01/08/2013     Priority: Medium    CARDIOVASCULAR SCREENING; LDL GOAL LESS THAN 160 10/31/2010     Priority: Medium    Anxiety 05/29/2009     Priority: Medium    Irritable bowel syndrome 10/06/2004     Priority: Medium     Formatting of this note might be different from the original.  LW Onset:  06Oct04        Past Medical History:   Diagnosis Date    Anxiety     sees psychiatrist    Arthritis Feb. 2019    Heart murmur     Migraine, unspecified, without mention of intractable migraine without mention of status migrainosus     Sleep apnea     Sleep disturbance, unspecified     hx sleep apnea    Viral warts, unspecified      Past Surgical History:   Procedure Laterality Date    BIOPSY  ? 2010    Needle biopsy- breast- benign    BIOPSY BREAST SEED LOCALIZATION Left 01/24/2020    Procedure: LEFT BREAST SEED LOCALIZED EXCISIONAL BIOPSY;  Surgeon: Cristi Arndt MD;  Location: [facility] OR    BREAST SURGERY  2020?    Lumpectomy    COLONOSCOPY Left 12/18/2017    Procedure:  "COLONOSCOPY;  Colonoscopy; difficulty in advancing scope (tight corner) so used biopsy forcep to follow/ advance scope;  Surgeon: Babar Gramajo MD;  Location: [facility] GI    IR LUMBAR PUNCTURE  11/22/2024    ORTHOPEDIC SURGERY  June 2024    Hip Replacement    TONSILLECTOMY       Current Outpatient Medications   Medication Sig Dispense Refill    acetaminophen-caffeine (EXCEDRIN TENSION HEADACHE) 500-65 MG TABS Take 2 tablets by mouth every 6 hours as needed for mild pain      clonazePAM (KLONOPIN) 0.5 MG tablet TK 1 T PO D PRA  1    sertraline (ZOLOFT) 100 MG tablet Take 1 tablet (100 mg) by mouth daily. 90 tablet 3       Allergies   Allergen Reactions    Penicillins      PN: LW Reaction: rash        Social History     Tobacco Use    Smoking status: Never    Smokeless tobacco: Never   Substance Use Topics    Alcohol use: No     Family History   Problem Relation Age of Onset    Hypertension Mother     Cancer Mother 70        nonHodgkins lymphoma    Psychotic Disorder Mother         anxiety    Other Cancer Mother         Non Hodgkins lymphoma    Alzheimer Disease Father     Breast Cancer Sister         breast ca age 38    Gastrointestinal Disease Brother         crohns or colitis    No Known Problems Daughter     Colon Cancer No family hx of     Ovarian Cancer No family hx of      History   Drug Use No         Review of Systems  Constitutional, neuro, ENT, endocrine, pulmonary, cardiac, gastrointestinal, genitourinary, musculoskeletal, integument and psychiatric systems are negative, except as otherwise noted.    Objective    /74 (BP Location: Right arm, Patient Position: Sitting, Cuff Size: Adult Regular)   Pulse 58   Temp 98.2  F (36.8  C) (Oral)   Resp 16   Ht 1.676 m (5' 6\")   Wt 69.4 kg (153 lb)   LMP 09/20/2019 (Approximate)   SpO2 99%   Breastfeeding No   BMI 24.69 kg/m     Estimated body mass index is 24.69 kg/m  as calculated from the following:    Height as of this encounter: 1.676 m (5' 6\").    " Weight as of this encounter: 69.4 kg (153 lb).  Physical Exam  GENERAL: alert and no distress  EYES: Eyes grossly normal to inspection, PERRL and conjunctivae and sclerae normal  HENT: ear canals and TM's normal, nose and mouth without ulcers or lesions  NECK: no adenopathy, no asymmetry, masses, or scars  RESP: lungs clear to auscultation - no rales, rhonchi or wheezes  CV: regular rate and rhythm, normal S1 S2, no S3 or S4, no murmur, click or rub, no peripheral edema  ABDOMEN: soft, nontender, no hepatosplenomegaly, no masses and bowel sounds normal  MS: no gross musculoskeletal defects noted, no edema  SKIN: no suspicious lesions or rashes  NEURO: Normal strength and tone, mentation intact and speech normal  PSYCH: mentation appears normal, affect normal/bright    Recent Labs   Lab Test 06/10/25  1034 02/19/25  1254 08/12/24  0821   HGB 13.2 12.6 11.8    245 267   NA  --  141 140   POTASSIUM  --  3.7 4.2   CR  --  0.70 0.63   A1C 5.6  --   --         Diagnostics  Labs pending at this time.  Results will be reviewed when available.   EKG: appears normal, NSR, sinus bradycardia, normal axis, normal intervals, no acute ST/T changes c/w ischemia, no LVH by voltage criteria, unchanged from previous tracings    Revised Cardiac Risk Index (RCRI)  The patient has the following serious cardiovascular risks for perioperative complications:   - No serious cardiac risks = 0 points     RCRI Interpretation: 0 points: Class I (very low risk - 0.4% complication rate)         Signed Electronically by: Chelly Orozco MD  A copy of this evaluation report is provided to the requesting physician.

## 2025-06-10 NOTE — PATIENT INSTRUCTIONS
Patient Education   Preparing for Your Surgery  For Adults  Getting started  In most cases, a nurse will call to review your health history and instructions. They will give you an arrival time based on your scheduled surgery time. Please be ready to share:  Your doctor's clinic name and phone number  Your medical, surgical, and anesthesia history  A list of allergies and sensitivities  A list of medicines, including herbal treatments and over-the-counter drugs  Whether the patient has a legal guardian (ask how to send us the papers in advance)  Note: You may not receive a call if you were seen at our PAC (Preoperative Assessment Center).  Please tell us if you're pregnant--or if there's any chance you might be pregnant. Some surgeries may injure a fetus (unborn baby), so they require a pregnancy test. Surgeries that are safe for a fetus don't always need a test, and you can choose whether to have one.   Preparing for surgery  Within 10 to 30 days of surgery: Have a pre-op exam (sometimes called an H&P, or History and Physical). This can be done at a clinic or pre-operative center.  If you're having a , you may not need this exam. Talk to your care team.  At your pre-op exam, talk to your care team about all medicines you take. (This includes CBD oil and any drugs, such as THC, marijuana, and other forms of cannabis.) If you need to stop any medicine before surgery, ask when to start taking it again.  This is for your safety. Many medicines and drugs can make you bleed too much during surgery. Some change how well surgery (anesthesia) drugs work.  Call your insurance company to let them know you're having surgery. (If you don't have insurance, call [phone].)  Call your clinic if there's any change in your health. This includes a scrape or scratch near the surgery site, or any signs of a cold (sore throat, runny nose, cough, rash, fever).  Eating and drinking guidelines  For your safety: Unless your  surgeon tells you otherwise, follow the guidelines below.  Eat and drink as normal until 8 hours before you arrive for surgery. After that, no food or milk. You can spit out gum when you arrive.  Drink clear liquids until 2 hours before you arrive. These are liquids you can see through, like water, Gatorade, and Propel Water. They also include plain black coffee and tea (no cream or milk).  No alcohol for 24 hours before you arrive. The night before surgery, stop any drinks that contain THC.  If your care team tells you to take medicine on the morning of surgery, it's okay to take it with a sip of water. No other medicines or drugs are allowed (including CBD oil)--follow your care team's instructions.  If you have questions the day of surgery, call your hospital or surgery center.   Preventing infection  Shower or bathe the night before and the morning of surgery. Follow the instructions your clinic gave you. (If no instructions, use regular soap.)  Don't shave or clip hair near your surgery site. We'll remove the hair if needed.  Don't smoke or vape the morning of surgery. No chewing tobacco for 6 hours before you arrive. A nicotine patch is okay. You may spit out nicotine gum when you arrive.  For some surgeries, the surgeon will tell you to fully quit smoking and nicotine.  We will make every effort to keep you safe from infection. We will:  Clean our hands often with soap and water (or an alcohol-based hand rub).  Clean the skin at your surgery site with a special soap that kills germs.  Give you a special gown to keep you warm. (Cold raises the risk of infection.)  Wear hair covers, masks, gowns, and gloves during surgery.  Give antibiotic medicine, if prescribed. Not all surgeries need this medicine.  What to bring on the day of surgery  Photo ID and insurance card  Copy of your health care directive, if you have one  Glasses and hearing aids (bring cases)  You can't wear contacts during surgery  Inhaler and  eye drops, if you use them (tell us about these when you arrive)  CPAP machine or breathing device, if you use them  A few personal items, if spending the night  If you have . . .  A pacemaker, ICD (cardiac defibrillator), or other implant: Bring the ID card.  An implanted stimulator: Bring the remote control.  A legal guardian: Bring a copy of the certified (court-stamped) guardianship papers.  Please remove any jewelry, including body piercings. Leave jewelry and other valuables at home.  If you're going home the day of surgery  You must have a support person drive you home. They should stay with you overnight, and they may need to help with your self-care.  If you don't have a support person, please tells us as soon as possible. We can help.  After surgery  If it's hard to control your pain or you need more pain medicine, please call your surgeon's office.  Questions?   If you have any questions for your care team, list them here:   ____________________________________________________________________________________________________________________________________________________________________________________________________________________________________________________________  For informational purposes only. Not to replace the advice of your health care provider. Copyright   2003, 2019 [employer]. All rights reserved. Clinically reviewed by Lucio Monk MD. [employer] [id number] - REV 02/25.

## 2025-06-11 LAB
ALBUMIN SERPL BCG-MCNC: 4.3 G/DL (ref 3.5–5.2)
ALP SERPL-CCNC: 60 U/L (ref 40–150)
ALT SERPL W P-5'-P-CCNC: 29 U/L (ref 0–50)
ANION GAP SERPL CALCULATED.3IONS-SCNC: 10 MMOL/L (ref 7–15)
AST SERPL W P-5'-P-CCNC: 36 U/L (ref 0–45)
BILIRUB SERPL-MCNC: 0.4 MG/DL
BUN SERPL-MCNC: 19.2 MG/DL (ref 6–20)
CALCIUM SERPL-MCNC: 9.6 MG/DL (ref 8.8–10.4)
CHLORIDE SERPL-SCNC: 104 MMOL/L (ref 98–107)
CHOLEST SERPL-MCNC: 204 MG/DL
CREAT SERPL-MCNC: 0.64 MG/DL (ref 0.51–0.95)
EGFRCR SERPLBLD CKD-EPI 2021: >90 ML/MIN/1.73M2
FASTING STATUS PATIENT QL REPORTED: YES
FASTING STATUS PATIENT QL REPORTED: YES
GLUCOSE SERPL-MCNC: 84 MG/DL (ref 70–99)
HCO3 SERPL-SCNC: 27 MMOL/L (ref 22–29)
HDLC SERPL-MCNC: 56 MG/DL
LDLC SERPL CALC-MCNC: 130 MG/DL
NONHDLC SERPL-MCNC: 148 MG/DL
POTASSIUM SERPL-SCNC: 3.7 MMOL/L (ref 3.4–5.3)
PROT SERPL-MCNC: 6.9 G/DL (ref 6.4–8.3)
SODIUM SERPL-SCNC: 141 MMOL/L (ref 135–145)
TRIGL SERPL-MCNC: 89 MG/DL
TSH SERPL DL<=0.005 MIU/L-ACNC: 2.33 UIU/ML (ref 0.3–4.2)

## 2025-06-12 ENCOUNTER — RESULTS FOLLOW-UP (OUTPATIENT)
Dept: FAMILY MEDICINE | Facility: CLINIC | Age: 60
End: 2025-06-12

## 2025-06-12 NOTE — PROGRESS NOTES
14  DAY STM VISIT    Diagnostic AHI:  20.7 HST    Message left for patient to return call     Assessment: Pt not meeting objective benchmarks for compliance      Action plan: waiting for patient to return call.  and pt to have 30 day STM visit.      Device type: Auto-CPAP    PAP settings:  CPAP MIN CPAP MAX 95TH % PRESSURE EPR RESMED SOFT RESPONSE SETTING   5.0 cm  H20 15.0 cm  H20 13.1 cm  H20  TWO OFF     Mask type:  Full Face Mask    Objective measures: 14 day rolling measures   COMPLIANCE LEAK AHI AVERAGE USE IN MINUTES   21 % 10.2 10.94 131   GOAL >70% GOAL < 24 LPM GOAL <5 GOAL >240          Total time spent on accessing and interpreting remote patient PAP therapy data  10 minutes    Total time spent counseling, coaching  and reviewing PAP therapy data with patient  1 minute    [id number]  [id number]  no (3 day STM)     Unable to urinate

## 2025-06-25 ENCOUNTER — E-VISIT (OUTPATIENT)
Dept: FAMILY MEDICINE | Facility: CLINIC | Age: 60
End: 2025-06-25
Payer: COMMERCIAL

## 2025-06-25 DIAGNOSIS — M26.609 TMJ (TEMPOROMANDIBULAR JOINT SYNDROME): Primary | ICD-10-CM

## 2025-06-30 ENCOUNTER — PATIENT OUTREACH (OUTPATIENT)
Dept: CARE COORDINATION | Facility: CLINIC | Age: 60
End: 2025-06-30
Payer: COMMERCIAL

## 2025-06-30 NOTE — OP NOTE
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  - History of LCIS and atypical ductal hyperplasia    Postop Diagnosis:    - Same s/p below stated procedure    Procedure:   HYSTEROSCOPY, WITH DILATION AND CURETTAGE OF UTERUS AND ENDOCERVIX USING MYOSURE AND ULTRASOUND GUIDANCE     Anesthesia:  GETA & Local  EBL:  5 mL  IVF:  See anesthesia record  UOP:  450 mL clear urine drained at end of case  Drains:  None  Fluid Deficit: 660 mL normal saline  Complications: None apparent    Specimens:    ID Type Source Tests Collected by Time Destination   1 : ENDOMETRIAL AND ENDOCERVICAL CURETTINGS Curettings Endocervical/cervical SURGICAL PATHOLOGY EXAM David Ibrahim MD 7/1/2025 12:20 PM      Indications:   Ms. Brii Taylor is a 59 year old with a history of DCIS and atypical tubular hyperplasia who present to GYN clinic for consultation due to history of postmenopausal bleeding and thickened endometrial stripe (7 mm) while on Tamoxifen, and more recent ultrasound showing a possible cervical polyp, trace fluid in the endometrial canal (2 mm), and a possible septate vs bicornuate uterus. No polyp was noted on exam. An endometrial biopsy was performed but the uterus only sounded to 5 cm, and the biopsy had insufficient tissue for diagnosis. Based on several abnormalities hysteroscopy with dilation and curettage was recommended and she agreed to proceed. The risks, benefits, and alternatives were discussed with the patient, and she agreed to proceed. Written informed consent was obtained prior to procedure including consent for a blood transfusion if indicated.    Findings:   Exam under anesthesia revealed atrophic external female  genitalia. Normal appearing vagina and cervix. No polyp on cervix. Small indentation on anterior lip of cervix presumably due to prior endometrial biopsy. No polyp visualized. Cervix easily dilated to 6 mm. Cervix sounded to 7 cm. 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 The left horn had some fluffy tissue and a possible polyp. No definitive ostia was seen. The right horn was very small and appeared as if it had scarred closed. A possible tubal ostia was seen. Intraoperative ultrasound did not show a cavity behind the tissue of the right horn. The endocervix appeared normal. The fluffier tissue in the left horn was resected and the uterus and endocervix were globally sampled with both the morcellator and with sharp curettage. Hemostatic cervix at close of case.    Procedure Details:   The patient was brought to the operating room where adequate GETA was administered. SCDs in place. Preoperative antibiotics were not indicated. Patient was placed in dorsal lithotomy position with feet in yellow fin stirups. Exam under anesthesia revealed findings noted above. The patient was prepped and draped in the usual sterile fashion. A sterile speculum was placed. The anterior lip of the cervix was injected with 1% Lidocaine plain and then grasped with a single tooth tenaculum. The cervix sequentially dilated passively to 6 mm with hegar dilators. The cervix was easily dilated. The uterus sounded to 7 cm. The hysteroscope was placed in the cervix with inflow turned on to achieve hydrodilation of the cervix and the hysteroscope was advanced into the uterine cavity. The uterine cavity was explored with findings as noted above. At this time ultrasound was asked to come to the OR for intraoperative ultrasound due to the uterine anomaly. The outflow was removed and Myosure Lite morcellator was inserted through the hysteroscope. The fluffier tissue in the left horn was resected and global curettage of the  uterus and endocervix was performed. Sharp curettage was also performed. The scope was removed from the uterus. The tenaculum was removed and tenaculum sites became hemostatic with pressure from a sponge stick and application of silver nitrate. All instruments were removed from the vagina. The bladder was drained.    The sponge, needle, and instrument counts were correct x2. The patient tolerated the procedure well and was transferred to recovery in stable condition.     Photos  1 - Endocervix  2 - Septum. Both horns seen. Opening to right is suspected right ostia.   3  and 5 - Polyp-like tissue in left horn  4 - Right horn    Pineda Ibrahim MD MPH  [facility]  07/01/2025 1:19 PM

## 2025-07-01 ENCOUNTER — ANESTHESIA (OUTPATIENT)
Dept: SURGERY | Facility: CLINIC | Age: 60
End: 2025-07-01
Payer: COMMERCIAL

## 2025-07-01 ENCOUNTER — HOSPITAL ENCOUNTER (OUTPATIENT)
Facility: CLINIC | Age: 60
Discharge: HOME | End: 2025-07-01
Attending: STUDENT IN AN ORGANIZED HEALTH CARE EDUCATION/TRAINING PROGRAM | Admitting: STUDENT IN AN ORGANIZED HEALTH CARE EDUCATION/TRAINING PROGRAM
Payer: COMMERCIAL

## 2025-07-01 ENCOUNTER — ANESTHESIA EVENT (OUTPATIENT)
Dept: SURGERY | Facility: CLINIC | Age: 60
End: 2025-07-01
Payer: COMMERCIAL

## 2025-07-01 ENCOUNTER — APPOINTMENT (OUTPATIENT)
Dept: ULTRASOUND IMAGING | Facility: CLINIC | Age: 60
End: 2025-07-01
Attending: STUDENT IN AN ORGANIZED HEALTH CARE EDUCATION/TRAINING PROGRAM
Payer: COMMERCIAL

## 2025-07-01 VITALS
HEART RATE: 61 BPM | TEMPERATURE: 97.2 F | RESPIRATION RATE: 9 BRPM | DIASTOLIC BLOOD PRESSURE: 78 MMHG | BODY MASS INDEX: 24.19 KG/M2 | SYSTOLIC BLOOD PRESSURE: 127 MMHG | WEIGHT: 149.9 LBS | OXYGEN SATURATION: 100 %

## 2025-07-01 LAB
HCG UR QL: NEGATIVE
HGB BLD-MCNC: 12.6 G/DL (ref 11.7–15.7)
MCV RBC AUTO: 86 FL (ref 78–100)

## 2025-07-01 PROCEDURE — 250N000013 HC RX MED GY IP 250 OP 250 PS 637: Performed by: STUDENT IN AN ORGANIZED HEALTH CARE EDUCATION/TRAINING PROGRAM

## 2025-07-01 PROCEDURE — 81025 URINE PREGNANCY TEST: CPT | Performed by: STUDENT IN AN ORGANIZED HEALTH CARE EDUCATION/TRAINING PROGRAM

## 2025-07-01 PROCEDURE — 250N000011 HC RX IP 250 OP 636: Performed by: ANESTHESIOLOGY

## 2025-07-01 PROCEDURE — 258N000003 HC RX IP 258 OP 636: Performed by: NURSE ANESTHETIST, CERTIFIED REGISTERED

## 2025-07-01 PROCEDURE — 88305 TISSUE EXAM BY PATHOLOGIST: CPT | Mod: TC | Performed by: STUDENT IN AN ORGANIZED HEALTH CARE EDUCATION/TRAINING PROGRAM

## 2025-07-01 PROCEDURE — 258N000003 HC RX IP 258 OP 636: Performed by: ANESTHESIOLOGY

## 2025-07-01 PROCEDURE — 272N000001 HC OR GENERAL SUPPLY STERILE: Performed by: STUDENT IN AN ORGANIZED HEALTH CARE EDUCATION/TRAINING PROGRAM

## 2025-07-01 PROCEDURE — 258N000001 HC RX 258: Performed by: STUDENT IN AN ORGANIZED HEALTH CARE EDUCATION/TRAINING PROGRAM

## 2025-07-01 PROCEDURE — 710N000012 HC RECOVERY PHASE 2, PER MINUTE: Performed by: STUDENT IN AN ORGANIZED HEALTH CARE EDUCATION/TRAINING PROGRAM

## 2025-07-01 PROCEDURE — 370N000017 HC ANESTHESIA TECHNICAL FEE, PER MIN: Performed by: STUDENT IN AN ORGANIZED HEALTH CARE EDUCATION/TRAINING PROGRAM

## 2025-07-01 PROCEDURE — 250N000009 HC RX 250: Performed by: NURSE ANESTHETIST, CERTIFIED REGISTERED

## 2025-07-01 PROCEDURE — 710N000009 HC RECOVERY PHASE 1, LEVEL 1, PER MIN: Performed by: STUDENT IN AN ORGANIZED HEALTH CARE EDUCATION/TRAINING PROGRAM

## 2025-07-01 PROCEDURE — 360N000076 HC SURGERY LEVEL 3, PER MIN: Performed by: STUDENT IN AN ORGANIZED HEALTH CARE EDUCATION/TRAINING PROGRAM

## 2025-07-01 PROCEDURE — 250N000009 HC RX 250: Performed by: STUDENT IN AN ORGANIZED HEALTH CARE EDUCATION/TRAINING PROGRAM

## 2025-07-01 PROCEDURE — 36415 COLL VENOUS BLD VENIPUNCTURE: CPT | Performed by: STUDENT IN AN ORGANIZED HEALTH CARE EDUCATION/TRAINING PROGRAM

## 2025-07-01 PROCEDURE — 250N000011 HC RX IP 250 OP 636: Performed by: NURSE ANESTHETIST, CERTIFIED REGISTERED

## 2025-07-01 PROCEDURE — 999N000141 HC STATISTIC PRE-PROCEDURE NURSING ASSESSMENT: Performed by: STUDENT IN AN ORGANIZED HEALTH CARE EDUCATION/TRAINING PROGRAM

## 2025-07-01 PROCEDURE — 250N000025 HC SEVOFLURANE, PER MIN: Performed by: STUDENT IN AN ORGANIZED HEALTH CARE EDUCATION/TRAINING PROGRAM

## 2025-07-01 PROCEDURE — 85018 HEMOGLOBIN: CPT | Performed by: STUDENT IN AN ORGANIZED HEALTH CARE EDUCATION/TRAINING PROGRAM

## 2025-07-01 PROCEDURE — 999N000063 US INTRAOPERATIVE: Mod: TC

## 2025-07-01 RX ORDER — SODIUM CHLORIDE, SODIUM LACTATE, POTASSIUM CHLORIDE, CALCIUM CHLORIDE 600; 310; 30; 20 MG/100ML; MG/100ML; MG/100ML; MG/100ML
INJECTION, SOLUTION INTRAVENOUS CONTINUOUS
Status: DISCONTINUED | OUTPATIENT
Start: 2025-07-01 | End: 2025-07-01 | Stop reason: HOSPADM

## 2025-07-01 RX ORDER — ALBUTEROL SULFATE 0.83 MG/ML
2.5 SOLUTION RESPIRATORY (INHALATION) EVERY 4 HOURS PRN
Status: DISCONTINUED | OUTPATIENT
Start: 2025-07-01 | End: 2025-07-01 | Stop reason: HOSPADM

## 2025-07-01 RX ORDER — HYDROMORPHONE HCL IN WATER/PF 6 MG/30 ML
0.4 PATIENT CONTROLLED ANALGESIA SYRINGE INTRAVENOUS EVERY 5 MIN PRN
Status: DISCONTINUED | OUTPATIENT
Start: 2025-07-01 | End: 2025-07-01 | Stop reason: HOSPADM

## 2025-07-01 RX ORDER — FENTANYL CITRATE 50 UG/ML
50 INJECTION, SOLUTION INTRAMUSCULAR; INTRAVENOUS EVERY 5 MIN PRN
Status: DISCONTINUED | OUTPATIENT
Start: 2025-07-01 | End: 2025-07-01 | Stop reason: HOSPADM

## 2025-07-01 RX ORDER — ACETAMINOPHEN 325 MG/1
975 TABLET ORAL
Status: DISCONTINUED | OUTPATIENT
Start: 2025-07-01 | End: 2025-07-01 | Stop reason: HOSPADM

## 2025-07-01 RX ORDER — KETOROLAC TROMETHAMINE 15 MG/ML
15 INJECTION, SOLUTION INTRAMUSCULAR; INTRAVENOUS
Status: COMPLETED | OUTPATIENT
Start: 2025-07-01 | End: 2025-07-01

## 2025-07-01 RX ORDER — FENTANYL CITRATE 50 UG/ML
INJECTION, SOLUTION INTRAMUSCULAR; INTRAVENOUS PRN
Status: DISCONTINUED | OUTPATIENT
Start: 2025-07-01 | End: 2025-07-01

## 2025-07-01 RX ORDER — PROPOFOL 10 MG/ML
INJECTION, EMULSION INTRAVENOUS PRN
Status: DISCONTINUED | OUTPATIENT
Start: 2025-07-01 | End: 2025-07-01

## 2025-07-01 RX ORDER — FENTANYL CITRATE 50 UG/ML
25 INJECTION, SOLUTION INTRAMUSCULAR; INTRAVENOUS
Status: DISCONTINUED | OUTPATIENT
Start: 2025-07-01 | End: 2025-07-01 | Stop reason: HOSPADM

## 2025-07-01 RX ORDER — GLYCOPYRROLATE 0.2 MG/ML
INJECTION, SOLUTION INTRAMUSCULAR; INTRAVENOUS PRN
Status: DISCONTINUED | OUTPATIENT
Start: 2025-07-01 | End: 2025-07-01

## 2025-07-01 RX ORDER — HYDRALAZINE HYDROCHLORIDE 20 MG/ML
2.5-5 INJECTION INTRAMUSCULAR; INTRAVENOUS EVERY 10 MIN PRN
Status: DISCONTINUED | OUTPATIENT
Start: 2025-07-01 | End: 2025-07-01 | Stop reason: HOSPADM

## 2025-07-01 RX ORDER — EPHEDRINE SULFATE 50 MG/ML
INJECTION, SOLUTION INTRAMUSCULAR; INTRAVENOUS; SUBCUTANEOUS PRN
Status: DISCONTINUED | OUTPATIENT
Start: 2025-07-01 | End: 2025-07-01

## 2025-07-01 RX ORDER — OXYCODONE HYDROCHLORIDE 5 MG/1
5 TABLET ORAL
Status: DISCONTINUED | OUTPATIENT
Start: 2025-07-01 | End: 2025-07-01 | Stop reason: HOSPADM

## 2025-07-01 RX ORDER — ONDANSETRON 2 MG/ML
4 INJECTION INTRAMUSCULAR; INTRAVENOUS EVERY 30 MIN PRN
Status: DISCONTINUED | OUTPATIENT
Start: 2025-07-01 | End: 2025-07-01 | Stop reason: HOSPADM

## 2025-07-01 RX ORDER — FENTANYL CITRATE 50 UG/ML
25 INJECTION, SOLUTION INTRAMUSCULAR; INTRAVENOUS EVERY 5 MIN PRN
Status: DISCONTINUED | OUTPATIENT
Start: 2025-07-01 | End: 2025-07-01 | Stop reason: HOSPADM

## 2025-07-01 RX ORDER — ONDANSETRON 4 MG/1
4 TABLET, ORALLY DISINTEGRATING ORAL EVERY 30 MIN PRN
Status: DISCONTINUED | OUTPATIENT
Start: 2025-07-01 | End: 2025-07-01 | Stop reason: HOSPADM

## 2025-07-01 RX ORDER — NALOXONE HYDROCHLORIDE 0.4 MG/ML
0.1 INJECTION, SOLUTION INTRAMUSCULAR; INTRAVENOUS; SUBCUTANEOUS
Status: DISCONTINUED | OUTPATIENT
Start: 2025-07-01 | End: 2025-07-01 | Stop reason: HOSPADM

## 2025-07-01 RX ORDER — DEXAMETHASONE SODIUM PHOSPHATE 4 MG/ML
4 INJECTION, SOLUTION INTRA-ARTICULAR; INTRALESIONAL; INTRAMUSCULAR; INTRAVENOUS; SOFT TISSUE
Status: DISCONTINUED | OUTPATIENT
Start: 2025-07-01 | End: 2025-07-01 | Stop reason: HOSPADM

## 2025-07-01 RX ORDER — LIDOCAINE HYDROCHLORIDE 20 MG/ML
INJECTION, SOLUTION INFILTRATION; PERINEURAL PRN
Status: DISCONTINUED | OUTPATIENT
Start: 2025-07-01 | End: 2025-07-01

## 2025-07-01 RX ORDER — HYDROMORPHONE HCL IN WATER/PF 6 MG/30 ML
0.2 PATIENT CONTROLLED ANALGESIA SYRINGE INTRAVENOUS EVERY 5 MIN PRN
Status: DISCONTINUED | OUTPATIENT
Start: 2025-07-01 | End: 2025-07-01 | Stop reason: HOSPADM

## 2025-07-01 RX ORDER — DEXAMETHASONE SODIUM PHOSPHATE 4 MG/ML
INJECTION, SOLUTION INTRA-ARTICULAR; INTRALESIONAL; INTRAMUSCULAR; INTRAVENOUS; SOFT TISSUE PRN
Status: DISCONTINUED | OUTPATIENT
Start: 2025-07-01 | End: 2025-07-01

## 2025-07-01 RX ORDER — OXYCODONE HYDROCHLORIDE 5 MG/1
5 TABLET ORAL EVERY 4 HOURS PRN
Status: DISCONTINUED | OUTPATIENT
Start: 2025-07-01 | End: 2025-07-01 | Stop reason: HOSPADM

## 2025-07-01 RX ORDER — SODIUM CHLORIDE, SODIUM LACTATE, POTASSIUM CHLORIDE, CALCIUM CHLORIDE 600; 310; 30; 20 MG/100ML; MG/100ML; MG/100ML; MG/100ML
INJECTION, SOLUTION INTRAVENOUS CONTINUOUS PRN
Status: DISCONTINUED | OUTPATIENT
Start: 2025-07-01 | End: 2025-07-01

## 2025-07-01 RX ORDER — ACETAMINOPHEN 325 MG/1
975 TABLET ORAL ONCE
Status: DISCONTINUED | OUTPATIENT
Start: 2025-07-01 | End: 2025-07-01 | Stop reason: HOSPADM

## 2025-07-01 RX ORDER — IBUPROFEN 800 MG/1
800 TABLET, FILM COATED ORAL ONCE
Status: DISCONTINUED | OUTPATIENT
Start: 2025-07-01 | End: 2025-07-01 | Stop reason: HOSPADM

## 2025-07-01 RX ORDER — ONDANSETRON 2 MG/ML
INJECTION INTRAMUSCULAR; INTRAVENOUS PRN
Status: DISCONTINUED | OUTPATIENT
Start: 2025-07-01 | End: 2025-07-01

## 2025-07-01 RX ORDER — LABETALOL HYDROCHLORIDE 5 MG/ML
10 INJECTION, SOLUTION INTRAVENOUS
Status: DISCONTINUED | OUTPATIENT
Start: 2025-07-01 | End: 2025-07-01 | Stop reason: HOSPADM

## 2025-07-01 RX ORDER — LIDOCAINE 40 MG/G
CREAM TOPICAL
Status: DISCONTINUED | OUTPATIENT
Start: 2025-07-01 | End: 2025-07-01 | Stop reason: HOSPADM

## 2025-07-01 RX ORDER — ACETAMINOPHEN 325 MG/1
975 TABLET ORAL ONCE
Status: COMPLETED | OUTPATIENT
Start: 2025-07-01 | End: 2025-07-01

## 2025-07-01 RX ADMIN — ONDANSETRON 4 MG: 2 INJECTION INTRAMUSCULAR; INTRAVENOUS at 12:09

## 2025-07-01 RX ADMIN — SODIUM CHLORIDE, SODIUM LACTATE, POTASSIUM CHLORIDE, AND CALCIUM CHLORIDE: .6; .31; .03; .02 INJECTION, SOLUTION INTRAVENOUS at 10:28

## 2025-07-01 RX ADMIN — ACETAMINOPHEN 975 MG: 325 TABLET ORAL at 10:21

## 2025-07-01 RX ADMIN — MIDAZOLAM 2 MG: 1 INJECTION INTRAMUSCULAR; INTRAVENOUS at 11:49

## 2025-07-01 RX ADMIN — EPHEDRINE SULFATE 5 MG: 5 INJECTION INTRAVENOUS at 12:31

## 2025-07-01 RX ADMIN — KETOROLAC TROMETHAMINE 15 MG: 15 INJECTION, SOLUTION INTRAMUSCULAR; INTRAVENOUS at 13:40

## 2025-07-01 RX ADMIN — FENTANYL CITRATE 25 MCG: 50 INJECTION, SOLUTION INTRAMUSCULAR; INTRAVENOUS at 13:36

## 2025-07-01 RX ADMIN — EPHEDRINE SULFATE 10 MG: 5 INJECTION INTRAVENOUS at 12:08

## 2025-07-01 RX ADMIN — DEXAMETHASONE SODIUM PHOSPHATE 4 MG: 4 INJECTION, SOLUTION INTRA-ARTICULAR; INTRALESIONAL; INTRAMUSCULAR; INTRAVENOUS; SOFT TISSUE at 11:57

## 2025-07-01 RX ADMIN — LIDOCAINE HYDROCHLORIDE 50 MG: 20 INJECTION, SOLUTION INFILTRATION; PERINEURAL at 11:57

## 2025-07-01 RX ADMIN — GLYCOPYRROLATE 0.2 MG: 0.2 INJECTION, SOLUTION INTRAMUSCULAR; INTRAVENOUS at 11:57

## 2025-07-01 RX ADMIN — EPHEDRINE SULFATE 5 MG: 5 INJECTION INTRAVENOUS at 12:05

## 2025-07-01 RX ADMIN — PROPOFOL 180 MG: 10 INJECTION, EMULSION INTRAVENOUS at 11:57

## 2025-07-01 RX ADMIN — FENTANYL CITRATE 50 MCG: 50 INJECTION INTRAMUSCULAR; INTRAVENOUS at 11:57

## 2025-07-01 RX ADMIN — SODIUM CHLORIDE, SODIUM LACTATE, POTASSIUM CHLORIDE, AND CALCIUM CHLORIDE: .6; .31; .03; .02 INJECTION, SOLUTION INTRAVENOUS at 11:49

## 2025-07-01 RX ADMIN — EPHEDRINE SULFATE 5 MG: 5 INJECTION INTRAVENOUS at 12:38

## 2025-07-01 ASSESSMENT — ACTIVITIES OF DAILY LIVING (ADL)
ADLS_ACUITY_SCORE: 15

## 2025-07-01 NOTE — ANESTHESIA POSTPROCEDURE EVALUATION
Patient: Brii Taylor    Procedure: Procedure(s):  HYSTEROSCOPY, WITH DILATION AND CURETTAGE OF UTERUS AND ENDOCERVIX USING MYOSURE AND ULTRASOUND GUIDANCE       Anesthesia Type:  General    Note:  Disposition: Outpatient   Postop Pain Control: Uneventful            Sign Out: Well controlled pain   PONV: No   Neuro/Psych: Uneventful            Sign Out: Acceptable/Baseline neuro status   Airway/Respiratory: Uneventful            Sign Out: Acceptable/Baseline resp. status   CV/Hemodynamics: Uneventful            Sign Out: Acceptable CV status; No obvious hypovolemia; No obvious fluid overload   Other NRE: NONE   DID A NON-ROUTINE EVENT OCCUR? No           Last vitals:  Vitals Value Taken Time   /78 07/01/25 14:00   Temp     Pulse 70 07/01/25 14:03   Resp 23 07/01/25 14:03   SpO2 100 % 07/01/25 14:03   Vitals shown include unfiled device data.    Electronically Signed By: Andrea Vásquez MD  July 1, 2025  2:08 PM

## 2025-07-01 NOTE — BRIEF OP NOTE
[facility]    Brief Operative Note    Pre-operative diagnosis: Abnormal pelvic ultrasound [R93.89]  PMB (postmenopausal bleeding) [N95.0]  Post-operative diagnosis Same as pre-operative diagnosis    Procedure: HYSTEROSCOPY, WITH DILATION AND CURETTAGE OF UTERUS AND ENDOCERVIX USING MYOSURE AND ULTRASOUND GUIDANCE, N/A - Vagina    Surgeon: Surgeons and Role:     * David Ibrahim MD - Primary  Anesthesia: Choice   Estimated Blood Loss: 5 mL from 7/1/2025 11:52 AM to 7/1/2025  1:00 PM      Drains: None  Specimens:   ID Type Source Tests Collected by Time Destination   1 : ENDOMETRIAL AND ENDOCERVICAL CURETTINGS Curettings Endocervical/cervical SURGICAL PATHOLOGY EXAM David Ibrahim MD 7/1/2025 12:20 PM      Findings:   See full operative note.  Complications: None.  Implants: * No implants in log *    David Ibrahim MD

## 2025-07-01 NOTE — DISCHARGE INSTRUCTIONS
Maximum acetaminophen (Tylenol) dose from all sources should not exceed 4 grams (4000 mg) per day. Last dose given at  10:20 am. Next dose after 4:20 pm.    You received Toradol, an IV form of Ibuprofen (Motrin) at 1:40pm.  Do not take any Ibuprofen products until after 7:40pm.      Dr. David Ibrahim  [facility] [address], [address]  [place] [address]  Phone: [phone]

## 2025-07-01 NOTE — ANESTHESIA PROCEDURE NOTES
Airway       Patient location during procedure: OR  Staff -        Anesthesiologist:  Falguni Stephens APRN CRNA       Performed By: CRNA  Consent for Airway        Urgency: elective  Indications and Patient Condition       Indications for airway management: herbert-procedural       Induction type:intravenous       Mask difficulty assessment: 1 - vent by mask    Final Airway Details       Final airway type: supraglottic airway    Supraglottic Airway Details        Type: LMA       Brand: I-Gel       LMA size: 4    Post intubation assessment        Placement verified by: capnometry, equal breath sounds and chest rise        Number of attempts at approach: 1       Secured with: commercial tube tellez       Ease of procedure: easy       Dentition: Intact         This patient was seen/examined, and reviewed with the resident provider.   I agree with the Family Medicine Residency note and assessment/plan.  Patient stable with main concern of left sided headache-- previous CT Head negative for acute changes.   Will discuss management with team while await placement.     ARTHUR Grayson MD/Family Medicine

## 2025-07-01 NOTE — ANESTHESIA PREPROCEDURE EVALUATION
Anesthesia Pre-Procedure Evaluation    Patient: Brii Taylor   MRN: [id number] : 1965          Procedure : Procedure(s):  HYSTEROSCOPY, WITH DILATION AND CURETTAGE OF UTERUS AND ENDOCERVIX         Past Medical History:   Diagnosis Date    Anxiety     sees psychiatrist    Arthritis 2019    Heart murmur     Migraine, unspecified, without mention of intractable migraine without mention of status migrainosus     Sleep apnea     Sleep disturbance, unspecified     hx sleep apnea    Viral warts, unspecified       Past Surgical History:   Procedure Laterality Date    BIOPSY  ? 2010    Needle biopsy- breast- benign    BIOPSY BREAST SEED LOCALIZATION Left 2020    Procedure: LEFT BREAST SEED LOCALIZED EXCISIONAL BIOPSY;  Surgeon: Cristi Arndt MD;  Location: [facility] OR    BREAST SURGERY  ?    Lumpectomy    COLONOSCOPY Left 2017    Procedure: COLONOSCOPY;  Colonoscopy; difficulty in advancing scope (tight corner) so used biopsy forcep to follow/ advance scope;  Surgeon: Babar Gramajo MD;  Location:  GI    IR LUMBAR PUNCTURE  2024    ORTHOPEDIC SURGERY  2024    Hip Replacement    TONSILLECTOMY        Allergies   Allergen Reactions    Penicillins      PN: LW Reaction: rash      Social History     Tobacco Use    Smoking status: Never    Smokeless tobacco: Never   Substance Use Topics    Alcohol use: No      Wt Readings from Last 1 Encounters:   25 68 kg (149 lb 14.4 oz)        Anesthesia Evaluation   Pt has had prior anesthetic.     No history of anesthetic complications       ROS/MED HX  ENT/Pulmonary: Comment: TMJ disorder    (+) sleep apnea, uses CPAP,                                      Neurologic:       Cardiovascular:     (+)  - -   -  - -                           valvular problems/murmurs type: MVP          METS/Exercise Tolerance: >4 METS    Hematologic:       Musculoskeletal:       GI/Hepatic:  - neg GI/hepatic ROS  (-) GERD   Renal/Genitourinary:       Endo:      "  Psychiatric/Substance Use:     (+) psychiatric history anxiety       Infectious Disease:       Malignancy:   (+) Malignancy, History of Breast.    Other:              Physical Exam  Airway  Mallampati: III  TM distance: >3 FB  Neck ROM: full  Mouth opening: < 4 cm    Cardiovascular    Dental     Pulmonary       Neurological   Other Findings       OUTSIDE LABS:  CBC:   Lab Results   Component Value Date    WBC 4.8 06/10/2025    WBC 7.8 02/19/2025    HGB 12.6 07/01/2025    HGB 13.2 06/10/2025    HCT 38.8 06/10/2025    HCT 37.6 02/19/2025     06/10/2025     02/19/2025     BMP:   Lab Results   Component Value Date     06/10/2025     02/19/2025    POTASSIUM 3.7 06/10/2025    POTASSIUM 3.7 02/19/2025    CHLORIDE 104 06/10/2025    CHLORIDE 104 02/19/2025    CO2 27 06/10/2025    CO2 29 02/19/2025    BUN 19.2 06/10/2025    BUN 17.4 02/19/2025    CR 0.64 06/10/2025    CR 0.70 02/19/2025    GLC 84 06/10/2025    GLC 85 02/19/2025     COAGS: No results found for: \"PTT\", \"INR\", \"FIBR\"  POC:   Lab Results   Component Value Date    HCG Negative 07/01/2025     HEPATIC:   Lab Results   Component Value Date    ALBUMIN 4.3 06/10/2025    PROTTOTAL 6.9 06/10/2025    ALT 29 06/10/2025    AST 36 06/10/2025    ALKPHOS 60 06/10/2025    BILITOTAL 0.4 06/10/2025     OTHER:   Lab Results   Component Value Date    A1C 5.6 06/10/2025    KENNETH 9.6 06/10/2025    TSH 2.33 06/10/2025    T4 0.92 04/14/2022    CRP <2.9 04/14/2022    SED 5 04/14/2022       Anesthesia Plan    ASA Status:  2      NPO Status: NPO Appropriate   Anesthesia Type: General.  Airway: supraglottic airway.  Induction: intravenous.  Maintenance: Balanced.   Techniques and Equipment:     - Airway:  Planned airway equipment includes supraglottic airway.     - Monitoring Plan: standard ASA monitoring     Consents    Anesthesia Plan(s) and associated risks, benefits, and realistic alternatives discussed. Questions answered and patient/representative(s) " expressed understanding.     - Discussed:     - Discussed with:  Patient               Postoperative Care    Pain management: non-narcotic analgesics, plan for postoperative opioid use.     Comments:                   Andrea Vásquez MD    I have reviewed the pertinent notes and labs in the chart from the past 30 days and (re)examined the patient.  Any updates or changes from those notes are reflected in this note.    Clinically Significant Risk Factors Present on Admission

## 2025-07-01 NOTE — ANESTHESIA CARE TRANSFER NOTE
Patient: Brii Taylor    Procedure: Procedure(s):  HYSTEROSCOPY, WITH DILATION AND CURETTAGE OF UTERUS AND ENDOCERVIX USING MYOSURE AND ULTRASOUND GUIDANCE       Diagnosis: Abnormal pelvic ultrasound [R93.89]  PMB (postmenopausal bleeding) [N95.0]  Diagnosis Additional Information: No value filed.    Anesthesia Type:   General     Note:    Oropharynx: oropharynx clear of all foreign objects and spontaneously breathing  Level of Consciousness: awake  Oxygen Supplementation: face mask    Independent Airway: airway patency satisfactory and stable  Dentition: dentition unchanged  Vital Signs Stable: post-procedure vital signs reviewed and stable  Report to RN Given: handoff report given  Patient transferred to: PACU  Comments: VSS.  Report to RN.  Handoff Report: Identifed the Patient, Identified the Reponsible Provider, Reviewed the pertinent medical history, Discussed the surgical course, Reviewed Intra-OP anesthesia mangement and issues during anesthesia, Set expectations for post-procedure period and Allowed opportunity for questions and acknowledgement of understanding  Vitals:  Vitals Value Taken Time   /85 07/01/25 13:05   Temp     Pulse 73 07/01/25 13:05   Resp     SpO2 100 % 07/01/25 13:06   Vitals shown include unfiled device data.    Electronically Signed By: ANGELA Flores CRNA  July 1, 2025  1:08 PM

## 2025-07-02 ENCOUNTER — RESULTS FOLLOW-UP (OUTPATIENT)
Dept: OBGYN | Facility: CLINIC | Age: 60
End: 2025-07-02

## 2025-07-02 PROCEDURE — 88305 TISSUE EXAM BY PATHOLOGIST: CPT | Mod: 26 | Performed by: PATHOLOGY

## 2025-07-16 ENCOUNTER — OFFICE VISIT (OUTPATIENT)
Dept: OBGYN | Facility: CLINIC | Age: 60
End: 2025-07-16
Payer: COMMERCIAL

## 2025-07-16 VITALS
WEIGHT: 157.1 LBS | BODY MASS INDEX: 25.25 KG/M2 | HEIGHT: 66 IN | DIASTOLIC BLOOD PRESSURE: 78 MMHG | SYSTOLIC BLOOD PRESSURE: 130 MMHG

## 2025-07-16 DIAGNOSIS — N95.0 PMB (POSTMENOPAUSAL BLEEDING): ICD-10-CM

## 2025-07-16 DIAGNOSIS — Z98.890 STATUS POST HYSTEROSCOPY: Primary | ICD-10-CM

## 2025-07-16 DIAGNOSIS — N84.0 ENDOMETRIAL POLYP: ICD-10-CM

## 2025-07-16 PROCEDURE — 3078F DIAST BP <80 MM HG: CPT | Performed by: STUDENT IN AN ORGANIZED HEALTH CARE EDUCATION/TRAINING PROGRAM

## 2025-07-16 PROCEDURE — 99213 OFFICE O/P EST LOW 20 MIN: CPT | Performed by: STUDENT IN AN ORGANIZED HEALTH CARE EDUCATION/TRAINING PROGRAM

## 2025-07-16 PROCEDURE — 3075F SYST BP GE 130 - 139MM HG: CPT | Performed by: STUDENT IN AN ORGANIZED HEALTH CARE EDUCATION/TRAINING PROGRAM

## 2025-07-16 NOTE — PROGRESS NOTES
"[facility]  Postoperative Visit    CC: Postoperative visit    S:  Brii Taylor is a 59 year old who presents for a postop visit following a hysteroscopy on 7/1/25 with benign results. She is here alone.    - Doing well today. No concerns.     O:  /78 (BP Location: Right arm, Cuff Size: Adult Regular)   Ht 1.676 m (5' 6\")   Wt 71.3 kg (157 lb 1.6 oz)   LMP 09/20/2019 (Approximate)   BMI 25.36 kg/m      Exam:  GEN: Appears well, NAD    Labs:  Pathology Report 7/1/25:  Final Diagnosis   ENDOMETRIAL AND ENDOCERVICAL CURETTINGS:        1.  MULTIPLE FRAGMENTS OF BENIGN ENDOMETRIAL POLYP        2.  SCANT ATROPHIC ENDOMETRIUM, WITHOUT ATYPIA        3.  SCANT SQUAMOUS EPITHELIUM, WITHOUT DYSPLASIA        4.  NO EVIDENCE OF ENDOMETRIAL HYPERPLASIA, EIN OR MALIGNANCY     A/P:  Brii Taylor is a 59 year old who presents for a postop visit following a hysteroscopy on 7/1/25 for PMB, possible cervical polyp, thickened EMS, trace fluid in endometrial canal, and possible septate vs bicornuate uterus with history of LICS and atypical ductal hyperplasia with benign results showing polyp and benign endometrial tissue.    #Postop Status  #Endometrial Polyp  - Doing well  - Reviewed pathology report.   - Reviewed apparent blunted right horn of uterus but US intraoperative US not showing evidence of cavity behind what is was seen hysteroscopically. See op note for details.   - Return in 1 year for annual gyn exam vs with PCP as part of routine annual visit  - RTC sooner if any bleeding or abnormal discharge. In that setting would do further evaluation/treatment with options of medical management, repeat hysteroscopy, hysterectomy    Pineda Ibrahim MD MPH  [facility]  07/16/2025 8:43 AM    "

## 2025-07-17 ENCOUNTER — DOCUMENTATION ONLY (OUTPATIENT)
Dept: SLEEP MEDICINE | Facility: CLINIC | Age: 60
End: 2025-07-17
Payer: COMMERCIAL

## 2025-07-17 DIAGNOSIS — G47.33 OSA (OBSTRUCTIVE SLEEP APNEA): Primary | ICD-10-CM

## 2025-07-30 ENCOUNTER — OFFICE VISIT (OUTPATIENT)
Dept: URGENT CARE | Facility: URGENT CARE | Age: 60
End: 2025-07-30
Payer: COMMERCIAL

## 2025-07-30 VITALS
SYSTOLIC BLOOD PRESSURE: 122 MMHG | BODY MASS INDEX: 23.63 KG/M2 | RESPIRATION RATE: 18 BRPM | OXYGEN SATURATION: 100 % | HEIGHT: 66 IN | HEART RATE: 58 BPM | DIASTOLIC BLOOD PRESSURE: 84 MMHG | WEIGHT: 147 LBS | TEMPERATURE: 97.3 F

## 2025-07-30 DIAGNOSIS — N76.0 BV (BACTERIAL VAGINOSIS): ICD-10-CM

## 2025-07-30 DIAGNOSIS — R39.89 URINARY PROBLEM: ICD-10-CM

## 2025-07-30 DIAGNOSIS — N30.00 ACUTE CYSTITIS WITHOUT HEMATURIA: Primary | ICD-10-CM

## 2025-07-30 DIAGNOSIS — B96.89 BV (BACTERIAL VAGINOSIS): ICD-10-CM

## 2025-07-30 LAB
ALBUMIN UR-MCNC: NEGATIVE MG/DL
APPEARANCE UR: ABNORMAL
BACTERIA #/AREA URNS HPF: ABNORMAL /HPF
BILIRUB UR QL STRIP: NEGATIVE
CLUE CELLS: PRESENT
COLOR UR AUTO: YELLOW
GLUCOSE UR STRIP-MCNC: NEGATIVE MG/DL
HGB UR QL STRIP: ABNORMAL
KETONES UR STRIP-MCNC: NEGATIVE MG/DL
LEUKOCYTE ESTERASE UR QL STRIP: ABNORMAL
NITRATE UR QL: NEGATIVE
PH UR STRIP: 6 [PH] (ref 5–7)
RBC #/AREA URNS AUTO: ABNORMAL /HPF
SP GR UR STRIP: 1.01 (ref 1–1.03)
SQUAMOUS #/AREA URNS AUTO: ABNORMAL /LPF
TRICHOMONAS, WET PREP: ABNORMAL
UROBILINOGEN UR STRIP-ACNC: 0.2 E.U./DL
WBC #/AREA URNS AUTO: ABNORMAL /HPF
WBC CLUMPS #/AREA URNS HPF: PRESENT /HPF
WBC'S/HIGH POWER FIELD, WET PREP: ABNORMAL
YEAST, WET PREP: ABNORMAL

## 2025-07-30 PROCEDURE — 99213 OFFICE O/P EST LOW 20 MIN: CPT | Performed by: FAMILY MEDICINE

## 2025-07-30 PROCEDURE — 87086 URINE CULTURE/COLONY COUNT: CPT | Performed by: FAMILY MEDICINE

## 2025-07-30 PROCEDURE — 87210 SMEAR WET MOUNT SALINE/INK: CPT | Performed by: FAMILY MEDICINE

## 2025-07-30 PROCEDURE — 3079F DIAST BP 80-89 MM HG: CPT | Performed by: FAMILY MEDICINE

## 2025-07-30 PROCEDURE — 87186 SC STD MICRODIL/AGAR DIL: CPT | Performed by: FAMILY MEDICINE

## 2025-07-30 PROCEDURE — 81001 URINALYSIS AUTO W/SCOPE: CPT | Performed by: FAMILY MEDICINE

## 2025-07-30 PROCEDURE — 3074F SYST BP LT 130 MM HG: CPT | Performed by: FAMILY MEDICINE

## 2025-07-30 RX ORDER — METRONIDAZOLE 7.5 MG/G
1 GEL VAGINAL DAILY
Qty: 35 G | Refills: 0 | Status: SHIPPED | OUTPATIENT
Start: 2025-07-30 | End: 2025-08-06

## 2025-07-30 RX ORDER — NITROFURANTOIN 25; 75 MG/1; MG/1
100 CAPSULE ORAL 2 TIMES DAILY
Qty: 10 CAPSULE | Refills: 0 | Status: SHIPPED | OUTPATIENT
Start: 2025-07-30 | End: 2025-08-04

## 2025-07-30 NOTE — PROGRESS NOTES
Urgent Care Clinic Visit    Chief Complaint   Patient presents with    Urgent Care     Just got back from vacation x 1 week, urgency, not bleeding or burning, constant feeling of urgency even after already going. When she is urinating has a pressure feeling, uncomfortable. No vaginal itching or odor. 4 week ago has a hysteroscopy.                7/30/2025    10:08 AM   Additional Questions   Roomed by Edwige COFFEY

## 2025-07-30 NOTE — PROGRESS NOTES
"  ICD-10-CM    1. Acute cystitis without hematuria  N30.00 nitroFURantoin macrocrystal-monohydrate (MACROBID) 100 MG capsule      2. BV (bacterial vaginosis)  N76.0 metroNIDAZOLE (METROGEL) 0.75 % vaginal gel    B96.89       3. Urinary problem  R39.89 UA Macroscopic with reflex to Microscopic and Culture - Lab Collect     Wet prep - lab collect     UA Macroscopic with reflex to Microscopic and Culture - Lab Collect     Wet prep - lab collect     Urine Microscopic Exam     Urine Culture        UA consistent with UTI and wet prep positive for clue cells associated with BV.  No evidence of pyelonephritis at this time.    PLAN:  Patient Instructions   Your testing today shows that you likely have both a bladder infection and a vaginal bacterial overgrowth.      For bladder infection, take nitrofurantoin twice daily for 5 days.    For BV, use metronidazole gel in the vagina at bedtime for 7 days.    If not completely resolved after antibiotics, or sooner if worsening, return to urgent care.      SUBJECTIVE:  Brii Taylor is a 59 year old female who presents to  today with about a week of urinary urgency and frequency.  Lower pelvic pressure.  No vaginal itching or odoro noted.  Had hysteroscopy done about 4 weeks ago and she wonders if that could be related to current symptoms.  No blood in the urine that she's seen.  No dysuria.  No fevers.  No back or flank pain.    OBJECTIVE:  Blood pressure 122/84, pulse 58, temperature 97.3  F (36.3  C), resp. rate 18, height 1.676 m (5' 6\"), weight 66.7 kg (147 lb), last menstrual period 09/20/2019, SpO2 100%, not currently breastfeeding.  GEN: well-appearing, in NAD    Results for orders placed or performed in visit on 07/30/25   UA Macroscopic with reflex to Microscopic and Culture - Lab Collect     Status: Abnormal    Specimen: Urine, Clean Catch   Result Value Ref Range    Color Urine Yellow Colorless, Straw, Light Yellow, Yellow    Appearance Urine Cloudy (A) Clear    " Glucose Urine Negative Negative mg/dL    Bilirubin Urine Negative Negative    Ketones Urine Negative Negative mg/dL    Specific Gravity Urine 1.010 1.003 - 1.035    Blood Urine Large (A) Negative    pH Urine 6.0 5.0 - 7.0    Protein Albumin Urine Negative Negative mg/dL    Urobilinogen Urine 0.2 0.2, 1.0 E.U./dL    Nitrite Urine Negative Negative    Leukocyte Esterase Urine Large (A) Negative   Urine Microscopic Exam     Status: Abnormal   Result Value Ref Range    Bacteria Urine Moderate (A) None Seen /HPF    RBC Urine 25-50 (A) 0-2 /HPF /HPF    WBC Urine  (A) 0-5 /HPF /HPF    Squamous Epithelials Urine Few (A) None Seen /LPF    WBC Clumps Urine Present (A) None Seen /HPF   Wet prep - lab collect     Status: Abnormal    Specimen: Vagina; Swab   Result Value Ref Range    Trichomonas Absent Absent    Yeast Absent Absent    Clue Cells Present (A) Absent    WBCs/high power field 2+ (A) None           Results for orders placed or performed in visit on 07/30/25   UA Macroscopic with reflex to Microscopic and Culture - Lab Collect     Status: Abnormal    Specimen: Urine, Clean Catch   Result Value Ref Range    Color Urine Yellow Colorless, Straw, Light Yellow, Yellow    Appearance Urine Cloudy (A) Clear    Glucose Urine Negative Negative mg/dL    Bilirubin Urine Negative Negative    Ketones Urine Negative Negative mg/dL    Specific Gravity Urine 1.010 1.003 - 1.035    Blood Urine Large (A) Negative    pH Urine 6.0 5.0 - 7.0    Protein Albumin Urine Negative Negative mg/dL    Urobilinogen Urine 0.2 0.2, 1.0 E.U./dL    Nitrite Urine Negative Negative    Leukocyte Esterase Urine Large (A) Negative   Urine Microscopic Exam     Status: Abnormal   Result Value Ref Range    Bacteria Urine Moderate (A) None Seen /HPF    RBC Urine 25-50 (A) 0-2 /HPF /HPF    WBC Urine  (A) 0-5 /HPF /HPF    Squamous Epithelials Urine Few (A) None Seen /LPF    WBC Clumps Urine Present (A) None Seen /HPF   Wet prep - lab collect      Status: Abnormal    Specimen: Vagina; Swab   Result Value Ref Range    Trichomonas Absent Absent    Yeast Absent Absent    Clue Cells Present (A) Absent    WBCs/high power field 2+ (A) None

## 2025-07-30 NOTE — PATIENT INSTRUCTIONS
Your testing today shows that you likely have both a bladder infection and a vaginal bacterial overgrowth.      For bladder infection, take nitrofurantoin twice daily for 5 days.    For BV, use metronidazole gel in the vagina at bedtime for 7 days.    If not completely resolved after antibiotics, or sooner if worsening, return to urgent care.

## 2025-07-31 LAB — BACTERIA UR CULT: ABNORMAL

## 2025-08-11 ENCOUNTER — HOSPITAL ENCOUNTER (OUTPATIENT)
Dept: MRI IMAGING | Facility: CLINIC | Age: 60
Discharge: HOME | End: 2025-08-11
Attending: PHYSICIAN ASSISTANT | Admitting: PHYSICIAN ASSISTANT
Payer: COMMERCIAL

## 2025-08-11 ENCOUNTER — TELEPHONE (OUTPATIENT)
Dept: ONCOLOGY | Facility: CLINIC | Age: 60
End: 2025-08-11
Payer: COMMERCIAL

## 2025-08-11 DIAGNOSIS — D05.02 NEOPLASM OF LEFT BREAST, PRIMARY TUMOR STAGING CATEGORY TIS: LOBULAR CARCINOMA IN SITU (LCIS): ICD-10-CM

## 2025-08-11 PROCEDURE — A9585 GADOBUTROL INJECTION: HCPCS | Performed by: PHYSICIAN ASSISTANT

## 2025-08-11 PROCEDURE — 77049 MRI BREAST C-+ W/CAD BI: CPT

## 2025-08-11 PROCEDURE — 255N000002 HC RX 255 OP 636: Performed by: PHYSICIAN ASSISTANT

## 2025-08-11 RX ORDER — GADOBUTROL 604.72 MG/ML
6.5 INJECTION INTRAVENOUS ONCE
Status: COMPLETED | OUTPATIENT
Start: 2025-08-11 | End: 2025-08-11

## 2025-08-11 RX ADMIN — GADOBUTROL 6.5 ML: 604.72 INJECTION INTRAVENOUS at 10:32

## 2025-08-18 ENCOUNTER — HOSPITAL ENCOUNTER (OUTPATIENT)
Dept: ULTRASOUND IMAGING | Facility: CLINIC | Age: 60
Discharge: HOME | End: 2025-08-18
Attending: PHYSICIAN ASSISTANT
Payer: COMMERCIAL

## 2025-08-18 DIAGNOSIS — R93.89 ABNORMAL MRI: ICD-10-CM

## 2025-08-18 DIAGNOSIS — R92.8 ABNORMAL MRI, BREAST: Primary | ICD-10-CM

## 2025-08-18 PROCEDURE — 76642 ULTRASOUND BREAST LIMITED: CPT | Mod: RT

## 2025-08-23 ENCOUNTER — OFFICE VISIT (OUTPATIENT)
Dept: URGENT CARE | Facility: URGENT CARE | Age: 60
End: 2025-08-23
Payer: COMMERCIAL

## 2025-08-23 VITALS
BODY MASS INDEX: 24.91 KG/M2 | HEIGHT: 66 IN | SYSTOLIC BLOOD PRESSURE: 124 MMHG | OXYGEN SATURATION: 98 % | DIASTOLIC BLOOD PRESSURE: 73 MMHG | RESPIRATION RATE: 16 BRPM | WEIGHT: 155 LBS | TEMPERATURE: 97.7 F | HEART RATE: 62 BPM

## 2025-08-23 DIAGNOSIS — R39.89 URINARY PROBLEM: Primary | ICD-10-CM

## 2025-08-23 LAB
ALBUMIN UR-MCNC: NEGATIVE MG/DL
APPEARANCE UR: CLEAR
BILIRUB UR QL STRIP: NEGATIVE
CLUE CELLS: ABNORMAL
COLOR UR AUTO: YELLOW
GLUCOSE UR STRIP-MCNC: NEGATIVE MG/DL
HGB UR QL STRIP: ABNORMAL
KETONES UR STRIP-MCNC: NEGATIVE MG/DL
LEUKOCYTE ESTERASE UR QL STRIP: ABNORMAL
NITRATE UR QL: NEGATIVE
PH UR STRIP: 7 [PH] (ref 5–7)
RBC #/AREA URNS AUTO: ABNORMAL /HPF
SP GR UR STRIP: 1.01 (ref 1–1.03)
TRICHOMONAS, WET PREP: ABNORMAL
UROBILINOGEN UR STRIP-ACNC: 0.2 E.U./DL
WBC #/AREA URNS AUTO: ABNORMAL /HPF
WBC'S/HIGH POWER FIELD, WET PREP: ABNORMAL
YEAST, WET PREP: ABNORMAL

## 2025-08-23 PROCEDURE — 87210 SMEAR WET MOUNT SALINE/INK: CPT | Performed by: NURSE PRACTITIONER

## 2025-08-23 PROCEDURE — 3078F DIAST BP <80 MM HG: CPT | Performed by: NURSE PRACTITIONER

## 2025-08-23 PROCEDURE — 3074F SYST BP LT 130 MM HG: CPT | Performed by: NURSE PRACTITIONER

## 2025-08-23 PROCEDURE — 81001 URINALYSIS AUTO W/SCOPE: CPT | Performed by: NURSE PRACTITIONER

## 2025-08-23 PROCEDURE — 99213 OFFICE O/P EST LOW 20 MIN: CPT | Performed by: NURSE PRACTITIONER

## 2025-08-23 PROCEDURE — 87086 URINE CULTURE/COLONY COUNT: CPT | Performed by: NURSE PRACTITIONER

## 2025-08-25 ENCOUNTER — RESULTS FOLLOW-UP (OUTPATIENT)
Dept: URGENT CARE | Facility: URGENT CARE | Age: 60
End: 2025-08-25
Payer: COMMERCIAL

## 2025-08-25 LAB — BACTERIA UR CULT: NORMAL

## 2025-08-26 ENCOUNTER — HOSPITAL ENCOUNTER (OUTPATIENT)
Dept: MAMMOGRAPHY | Facility: CLINIC | Age: 60
Discharge: HOME | End: 2025-08-26
Attending: PHYSICIAN ASSISTANT
Payer: COMMERCIAL

## 2025-08-26 DIAGNOSIS — R92.8 ABNORMAL MRI, BREAST: ICD-10-CM

## 2025-08-26 PROCEDURE — 19085 BX BREAST 1ST LESION MR IMAG: CPT | Mod: RT

## 2025-08-26 PROCEDURE — A9585 GADOBUTROL INJECTION: HCPCS | Performed by: PHYSICIAN ASSISTANT

## 2025-08-26 PROCEDURE — 255N000002 HC RX 255 OP 636: Performed by: PHYSICIAN ASSISTANT

## 2025-08-26 RX ORDER — TAMOXIFEN CITRATE 20 MG/1
TABLET ORAL
COMMUNITY

## 2025-08-26 RX ORDER — LIDOCAINE HYDROCHLORIDE AND EPINEPHRINE 10; 10 MG/ML; UG/ML
20 INJECTION, SOLUTION INFILTRATION; PERINEURAL ONCE
Status: COMPLETED | OUTPATIENT
Start: 2025-08-26 | End: 2025-08-26

## 2025-08-26 RX ORDER — ACETAMINOPHEN/DP-HYDRAMINE 500MG-38MG
TABLET ORAL
COMMUNITY

## 2025-08-26 RX ORDER — GADOBUTROL 604.72 MG/ML
0.1 INJECTION INTRAVENOUS ONCE
Status: COMPLETED | OUTPATIENT
Start: 2025-08-26 | End: 2025-08-26

## 2025-08-26 RX ORDER — TAMOXIFEN CITRATE 10 MG/1
TABLET ORAL
COMMUNITY

## 2025-08-26 RX ADMIN — GADOBUTROL 7 ML: 604.72 INJECTION INTRAVENOUS at 09:12

## (undated) DEVICE — Device

## (undated) DEVICE — PREP SKIN SCRUB TRAY 4461A

## (undated) DEVICE — DRAPE POUCH IRR 1016

## (undated) DEVICE — GLOVE PROTEXIS BLUE W/NEU-THERA 7.5  2D73EB75

## (undated) DEVICE — SU SILK 3-0 SH 30" K832H

## (undated) DEVICE — BASIN SET MINOR DISP

## (undated) DEVICE — SUCTION CANISTER MEDIVAC LINER 3000ML W/LID 65651-530

## (undated) DEVICE — LINEN TOWEL PACK X10 5473

## (undated) DEVICE — GLOVE PROTEXIS POWDER FREE SMT 6.5  2D72PT65X

## (undated) DEVICE — LINEN FULL SHEET 5511

## (undated) DEVICE — PACK MINOR CUSTOM RIDGES SBA32RMRMA

## (undated) DEVICE — LINEN HALF SHEET 5512

## (undated) DEVICE — PREP DYNA-HEX 4% CHG SCRUB 4OZ BOTTLE MDS098710

## (undated) DEVICE — BAG CLEAR TRASH 1.3M 39X33" P4040C

## (undated) DEVICE — PEN MARKING SKIN

## (undated) DEVICE — DRSG STERI STRIP 1/2X4" R1547

## (undated) DEVICE — TRAY CATH SURESTEP OD14 FR INTERMITTENT STER LTX INTS14

## (undated) DEVICE — PAD CHUX UNDERPAD 30X36" P3036C

## (undated) DEVICE — DRAIN JACKSON PRATT RESERVOIR 100ML SU130-1305

## (undated) DEVICE — KIT ENDO TURNOVER/PROCEDURE W/CLEAN A SCOPE LINERS 103888

## (undated) DEVICE — SU VICRYL 3-0 SH 27" J316H

## (undated) DEVICE — SUCTION TIP YANKAUER W/O VENT K86

## (undated) DEVICE — GLOVE PROTEXIS BLUE W/NEU-THERA 6.5  2D73EB65

## (undated) DEVICE — PREP SCRUB SOL EXIDINE 4% CHG 4OZ 29002-404

## (undated) DEVICE — PACK MINOR LITHOTOMY RIDGES

## (undated) DEVICE — SEAL SET MYOSURE ROD LENS SCOPE SINGLE USE 40-902

## (undated) DEVICE — UNDERPAD 36X30 PREMIERPRO MAX ABS NS LF 676111

## (undated) DEVICE — ENDO FORCEP ENDOJAW BIOPSY 2.8MMX230CM FB-220U

## (undated) DEVICE — SOLUTION IRRIGATION 0.9% NACL 1000ML BOTTLE R5200-01

## (undated) DEVICE — SU VICRYL 4-0 PS-2 18" UND J496H

## (undated) DEVICE — DEVICE TISSUE REMOVAL HYSTEROSCOPIC MYOSURE LITE 30-401LITE

## (undated) DEVICE — GLOVE PROTEXIS BLUE W/NEU-THERA 7.0  2D73EB70

## (undated) DEVICE — TUBING SUCTION 12"X1/4" N612

## (undated) DEVICE — SLEEVE PROTECTIVE BREAST BIOPSY  GMSLV001-10

## (undated) DEVICE — CLIP ETHICON LIGACLIP MED WHITE LT200

## (undated) DEVICE — DRAPE LAP W/ARMBOARD 29410

## (undated) DEVICE — SOLUTION IV IRRIGATION 0.9% NACL 3L R8206

## (undated) DEVICE — GLOVE PROTEXIS BLUE W/NEU-THERA 8.0  2D73EB80

## (undated) DEVICE — SU VICRYL 3-0 TIE 12X18" J904T

## (undated) RX ORDER — KETOROLAC TROMETHAMINE 15 MG/ML
INJECTION, SOLUTION INTRAMUSCULAR; INTRAVENOUS
Status: DISPENSED
Start: 2025-07-01

## (undated) RX ORDER — GABAPENTIN 300 MG/1
CAPSULE ORAL
Status: DISPENSED
Start: 2020-01-24

## (undated) RX ORDER — FENTANYL CITRATE 50 UG/ML
INJECTION, SOLUTION INTRAMUSCULAR; INTRAVENOUS
Status: DISPENSED
Start: 2017-12-18

## (undated) RX ORDER — ONDANSETRON 2 MG/ML
INJECTION INTRAMUSCULAR; INTRAVENOUS
Status: DISPENSED
Start: 2025-07-01

## (undated) RX ORDER — PROPOFOL 10 MG/ML
INJECTION, EMULSION INTRAVENOUS
Status: DISPENSED
Start: 2020-01-24

## (undated) RX ORDER — ACETAMINOPHEN 325 MG/1
TABLET ORAL
Status: DISPENSED
Start: 2025-07-01

## (undated) RX ORDER — PROPOFOL 10 MG/ML
INJECTION, EMULSION INTRAVENOUS
Status: DISPENSED
Start: 2025-07-01

## (undated) RX ORDER — BUPIVACAINE HYDROCHLORIDE AND EPINEPHRINE 2.5; 5 MG/ML; UG/ML
INJECTION, SOLUTION EPIDURAL; INFILTRATION; INTRACAUDAL; PERINEURAL
Status: DISPENSED
Start: 2020-01-24

## (undated) RX ORDER — LIDOCAINE HYDROCHLORIDE 10 MG/ML
INJECTION, SOLUTION EPIDURAL; INFILTRATION; INTRACAUDAL; PERINEURAL
Status: DISPENSED
Start: 2025-07-01

## (undated) RX ORDER — FENTANYL CITRATE 50 UG/ML
INJECTION, SOLUTION INTRAMUSCULAR; INTRAVENOUS
Status: DISPENSED
Start: 2020-01-24

## (undated) RX ORDER — ONDANSETRON 2 MG/ML
INJECTION INTRAMUSCULAR; INTRAVENOUS
Status: DISPENSED
Start: 2020-01-24

## (undated) RX ORDER — CELECOXIB 200 MG/1
CAPSULE ORAL
Status: DISPENSED
Start: 2020-01-24

## (undated) RX ORDER — ACETAMINOPHEN 325 MG/1
TABLET ORAL
Status: DISPENSED
Start: 2020-01-24

## (undated) RX ORDER — FENTANYL CITRATE 50 UG/ML
INJECTION, SOLUTION INTRAMUSCULAR; INTRAVENOUS
Status: DISPENSED
Start: 2025-07-01

## (undated) RX ORDER — GLYCOPYRROLATE 0.2 MG/ML
INJECTION, SOLUTION INTRAMUSCULAR; INTRAVENOUS
Status: DISPENSED
Start: 2025-07-01

## (undated) RX ORDER — EPHEDRINE SULFATE 50 MG/ML
INJECTION, SOLUTION INTRAMUSCULAR; INTRAVENOUS; SUBCUTANEOUS
Status: DISPENSED
Start: 2025-07-01

## (undated) RX ORDER — DEXAMETHASONE SODIUM PHOSPHATE 4 MG/ML
INJECTION, SOLUTION INTRA-ARTICULAR; INTRALESIONAL; INTRAMUSCULAR; INTRAVENOUS; SOFT TISSUE
Status: DISPENSED
Start: 2025-07-01

## (undated) RX ORDER — CEFAZOLIN SODIUM 2 G/100ML
INJECTION, SOLUTION INTRAVENOUS
Status: DISPENSED
Start: 2020-01-24

## (undated) RX ORDER — OXYCODONE HYDROCHLORIDE 5 MG/1
TABLET ORAL
Status: DISPENSED
Start: 2020-01-24